# Patient Record
Sex: FEMALE | Race: WHITE | Employment: UNEMPLOYED | URBAN - NONMETROPOLITAN AREA
[De-identification: names, ages, dates, MRNs, and addresses within clinical notes are randomized per-mention and may not be internally consistent; named-entity substitution may affect disease eponyms.]

---

## 2019-10-30 ENCOUNTER — HOSPITAL ENCOUNTER (OUTPATIENT)
Dept: GENERAL RADIOLOGY | Age: 49
Discharge: HOME | End: 2019-10-30
Payer: MEDICAID

## 2019-10-30 ENCOUNTER — OFFICE VISIT (OUTPATIENT)
Dept: FAMILY MEDICINE CLINIC | Age: 49
End: 2019-10-30
Payer: MEDICAID

## 2019-10-30 VITALS
HEIGHT: 65 IN | HEART RATE: 78 BPM | DIASTOLIC BLOOD PRESSURE: 72 MMHG | BODY MASS INDEX: 30.66 KG/M2 | TEMPERATURE: 98.1 F | RESPIRATION RATE: 18 BRPM | WEIGHT: 184 LBS | OXYGEN SATURATION: 99 % | SYSTOLIC BLOOD PRESSURE: 116 MMHG

## 2019-10-30 DIAGNOSIS — Z23 NEED FOR VACCINATION: Primary | ICD-10-CM

## 2019-10-30 DIAGNOSIS — E78.01 FAMILIAL HYPERCHOLESTEROLEMIA: ICD-10-CM

## 2019-10-30 DIAGNOSIS — L40.9 PSORIASIS: ICD-10-CM

## 2019-10-30 DIAGNOSIS — M54.41 CHRONIC BILATERAL LOW BACK PAIN WITH RIGHT-SIDED SCIATICA: ICD-10-CM

## 2019-10-30 DIAGNOSIS — Z78.0 POSTMENOPAUSAL: ICD-10-CM

## 2019-10-30 DIAGNOSIS — Z76.89 ENCOUNTER TO ESTABLISH CARE: ICD-10-CM

## 2019-10-30 DIAGNOSIS — Z13.21 ENCOUNTER FOR VITAMIN DEFICIENCY SCREENING: ICD-10-CM

## 2019-10-30 DIAGNOSIS — G89.29 CHRONIC RIGHT HIP PAIN: ICD-10-CM

## 2019-10-30 DIAGNOSIS — M25.551 CHRONIC RIGHT HIP PAIN: ICD-10-CM

## 2019-10-30 DIAGNOSIS — G89.29 CHRONIC BILATERAL LOW BACK PAIN WITH RIGHT-SIDED SCIATICA: ICD-10-CM

## 2019-10-30 DIAGNOSIS — K21.9 GASTROESOPHAGEAL REFLUX DISEASE WITHOUT ESOPHAGITIS: ICD-10-CM

## 2019-10-30 DIAGNOSIS — F51.04 CHRONIC INSOMNIA: ICD-10-CM

## 2019-10-30 DIAGNOSIS — F17.210 CIGARETTE NICOTINE DEPENDENCE WITHOUT COMPLICATION: ICD-10-CM

## 2019-10-30 DIAGNOSIS — Z00.00 WELL ADULT EXAM: ICD-10-CM

## 2019-10-30 PROBLEM — E78.5 HYPERLIPIDEMIA: Status: ACTIVE | Noted: 2019-10-30

## 2019-10-30 PROCEDURE — 99204 OFFICE O/P NEW MOD 45 MIN: CPT | Performed by: NURSE PRACTITIONER

## 2019-10-30 PROCEDURE — 73502 X-RAY EXAM HIP UNI 2-3 VIEWS: CPT

## 2019-10-30 RX ORDER — OMEPRAZOLE 20 MG/1
20 CAPSULE, DELAYED RELEASE ORAL DAILY
COMMUNITY
End: 2019-10-30 | Stop reason: ALTCHOICE

## 2019-10-30 RX ORDER — VARENICLINE TARTRATE 25 MG
KIT ORAL
Qty: 1 BOX | Refills: 0 | Status: SHIPPED | OUTPATIENT
Start: 2019-10-30 | End: 2020-01-10 | Stop reason: ALTCHOICE

## 2019-10-30 RX ORDER — PANTOPRAZOLE SODIUM 40 MG/1
40 TABLET, DELAYED RELEASE ORAL
Qty: 30 TABLET | Refills: 5 | Status: SHIPPED | OUTPATIENT
Start: 2019-10-30 | End: 2021-10-12

## 2019-10-30 RX ORDER — IBUPROFEN 800 MG/1
800 TABLET ORAL 3 TIMES DAILY PRN
Qty: 90 TABLET | Refills: 3 | Status: SHIPPED | OUTPATIENT
Start: 2019-10-30 | End: 2020-03-23

## 2019-10-30 RX ORDER — VARENICLINE TARTRATE 1 MG/1
1 TABLET, FILM COATED ORAL 2 TIMES DAILY
Qty: 60 TABLET | Refills: 3 | Status: SHIPPED | OUTPATIENT
Start: 2019-10-30 | End: 2020-01-10 | Stop reason: ALTCHOICE

## 2019-10-30 ASSESSMENT — ENCOUNTER SYMPTOMS
CHEST TIGHTNESS: 0
BACK PAIN: 1
NAUSEA: 0
SORE THROAT: 0
DIARRHEA: 0
ABDOMINAL PAIN: 0
COUGH: 0
WHEEZING: 0
SHORTNESS OF BREATH: 0
VOMITING: 0

## 2019-10-31 ENCOUNTER — TELEPHONE (OUTPATIENT)
Dept: FAMILY MEDICINE CLINIC | Age: 49
End: 2019-10-31

## 2019-11-26 DIAGNOSIS — Z13.21 ENCOUNTER FOR VITAMIN DEFICIENCY SCREENING: ICD-10-CM

## 2019-11-26 DIAGNOSIS — M25.551 CHRONIC RIGHT HIP PAIN: ICD-10-CM

## 2019-11-26 DIAGNOSIS — G89.29 CHRONIC RIGHT HIP PAIN: ICD-10-CM

## 2019-11-26 DIAGNOSIS — Z00.00 WELL ADULT EXAM: ICD-10-CM

## 2019-11-26 DIAGNOSIS — E78.01 FAMILIAL HYPERCHOLESTEROLEMIA: ICD-10-CM

## 2019-11-26 LAB
ALBUMIN SERPL-MCNC: 4.2 G/DL (ref 3.5–5.2)
ALP BLD-CCNC: 73 U/L (ref 35–104)
ALT SERPL-CCNC: 7 U/L (ref 5–33)
ANION GAP SERPL CALCULATED.3IONS-SCNC: 15 MMOL/L (ref 7–19)
AST SERPL-CCNC: 11 U/L (ref 5–32)
BACTERIA: ABNORMAL /HPF
BASOPHILS ABSOLUTE: 0 K/UL (ref 0–0.2)
BASOPHILS RELATIVE PERCENT: 0.1 % (ref 0–1)
BILIRUB SERPL-MCNC: 0.3 MG/DL (ref 0.2–1.2)
BILIRUBIN URINE: NEGATIVE
BLOOD, URINE: ABNORMAL
BUN BLDV-MCNC: 12 MG/DL (ref 6–20)
CALCIUM SERPL-MCNC: 9.1 MG/DL (ref 8.6–10)
CHLORIDE BLD-SCNC: 99 MMOL/L (ref 98–111)
CHOLESTEROL, TOTAL: 207 MG/DL (ref 160–199)
CLARITY: CLEAR
CO2: 26 MMOL/L (ref 22–29)
COLOR: YELLOW
CREAT SERPL-MCNC: 0.6 MG/DL (ref 0.5–0.9)
EOSINOPHILS ABSOLUTE: 0.2 K/UL (ref 0–0.6)
EOSINOPHILS RELATIVE PERCENT: 2.5 % (ref 0–5)
EPITHELIAL CELLS, UA: 8 /HPF (ref 0–5)
GFR NON-AFRICAN AMERICAN: >60
GLUCOSE BLD-MCNC: 94 MG/DL (ref 74–109)
GLUCOSE URINE: NEGATIVE MG/DL
HCT VFR BLD CALC: 45.1 % (ref 37–47)
HDLC SERPL-MCNC: 45 MG/DL (ref 65–121)
HEMOGLOBIN: 14.7 G/DL (ref 12–16)
HYALINE CASTS: 6 /HPF (ref 0–8)
IMMATURE GRANULOCYTES #: 0 K/UL
KETONES, URINE: NEGATIVE MG/DL
LDL CHOLESTEROL CALCULATED: 151 MG/DL
LEUKOCYTE ESTERASE, URINE: NEGATIVE
LYMPHOCYTES ABSOLUTE: 2.3 K/UL (ref 1.1–4.5)
LYMPHOCYTES RELATIVE PERCENT: 26.6 % (ref 20–40)
MCH RBC QN AUTO: 30.6 PG (ref 27–31)
MCHC RBC AUTO-ENTMCNC: 32.6 G/DL (ref 33–37)
MCV RBC AUTO: 94 FL (ref 81–99)
MONOCYTES ABSOLUTE: 0.5 K/UL (ref 0–0.9)
MONOCYTES RELATIVE PERCENT: 6.2 % (ref 0–10)
NEUTROPHILS ABSOLUTE: 5.4 K/UL (ref 1.5–7.5)
NEUTROPHILS RELATIVE PERCENT: 64.4 % (ref 50–65)
NITRITE, URINE: NEGATIVE
PDW BLD-RTO: 13.2 % (ref 11.5–14.5)
PH UA: 6 (ref 5–8)
PLATELET # BLD: 213 K/UL (ref 130–400)
PMV BLD AUTO: 10.9 FL (ref 9.4–12.3)
POTASSIUM SERPL-SCNC: 4 MMOL/L (ref 3.5–5)
PROTEIN UA: NEGATIVE MG/DL
RBC # BLD: 4.8 M/UL (ref 4.2–5.4)
RBC UA: 14 /HPF (ref 0–4)
SODIUM BLD-SCNC: 140 MMOL/L (ref 136–145)
SPECIFIC GRAVITY UA: 1.02 (ref 1–1.03)
TOTAL PROTEIN: 6.9 G/DL (ref 6.6–8.7)
TRIGL SERPL-MCNC: 57 MG/DL (ref 0–149)
TSH SERPL DL<=0.05 MIU/L-ACNC: 1.46 UIU/ML (ref 0.27–4.2)
UROBILINOGEN, URINE: 0.2 E.U./DL
VITAMIN D 25-HYDROXY: 18.9 NG/ML
WBC # BLD: 8.5 K/UL (ref 4.8–10.8)
WBC UA: 1 /HPF (ref 0–5)

## 2019-12-03 ENCOUNTER — HOSPITAL ENCOUNTER (OUTPATIENT)
Dept: WOMENS IMAGING | Age: 49
Discharge: HOME | End: 2019-12-03
Payer: MEDICAID

## 2019-12-03 ENCOUNTER — OFFICE VISIT (OUTPATIENT)
Dept: FAMILY MEDICINE CLINIC | Age: 49
End: 2019-12-03
Payer: MEDICAID

## 2019-12-03 VITALS
BODY MASS INDEX: 31.49 KG/M2 | HEIGHT: 65 IN | TEMPERATURE: 98.5 F | HEART RATE: 79 BPM | SYSTOLIC BLOOD PRESSURE: 110 MMHG | WEIGHT: 189 LBS | RESPIRATION RATE: 18 BRPM | DIASTOLIC BLOOD PRESSURE: 70 MMHG | OXYGEN SATURATION: 97 %

## 2019-12-03 DIAGNOSIS — J20.9 ACUTE BRONCHITIS, UNSPECIFIED ORGANISM: ICD-10-CM

## 2019-12-03 DIAGNOSIS — E78.49 FAMILIAL HYPERLIPIDEMIA: ICD-10-CM

## 2019-12-03 DIAGNOSIS — E55.9 VITAMIN D DEFICIENCY: ICD-10-CM

## 2019-12-03 DIAGNOSIS — Z00.00 WELL ADULT EXAM: ICD-10-CM

## 2019-12-03 DIAGNOSIS — Z12.39 BREAST CANCER SCREENING: ICD-10-CM

## 2019-12-03 DIAGNOSIS — K21.9 GASTROESOPHAGEAL REFLUX DISEASE WITHOUT ESOPHAGITIS: ICD-10-CM

## 2019-12-03 DIAGNOSIS — N64.4 BREAST PAIN, LEFT: ICD-10-CM

## 2019-12-03 DIAGNOSIS — Z12.4 SCREENING FOR CERVICAL CANCER: Primary | ICD-10-CM

## 2019-12-03 DIAGNOSIS — F17.210 CIGARETTE NICOTINE DEPENDENCE WITHOUT COMPLICATION: ICD-10-CM

## 2019-12-03 DIAGNOSIS — R31.29 MICROSCOPIC HEMATURIA: ICD-10-CM

## 2019-12-03 DIAGNOSIS — M16.11 PRIMARY OSTEOARTHRITIS OF RIGHT HIP: ICD-10-CM

## 2019-12-03 LAB
APPEARANCE FLUID: CLEAR
BILIRUBIN, POC: ABNORMAL
BLOOD URINE, POC: ABNORMAL
CLARITY, POC: CLEAR
COLOR, POC: YELLOW
GLUCOSE URINE, POC: ABNORMAL
KETONES, POC: ABNORMAL
LEUKOCYTE EST, POC: ABNORMAL
NITRITE, POC: ABNORMAL
PH, POC: 6.5
PROTEIN, POC: ABNORMAL
SPECIFIC GRAVITY, POC: 1.02
UROBILINOGEN, POC: 0.2

## 2019-12-03 PROCEDURE — 87086 URINE CULTURE/COLONY COUNT: CPT

## 2019-12-03 PROCEDURE — 99213 OFFICE O/P EST LOW 20 MIN: CPT | Performed by: NURSE PRACTITIONER

## 2019-12-03 PROCEDURE — 81002 URINALYSIS NONAUTO W/O SCOPE: CPT | Performed by: NURSE PRACTITIONER

## 2019-12-03 PROCEDURE — 99396 PREV VISIT EST AGE 40-64: CPT | Performed by: NURSE PRACTITIONER

## 2019-12-03 PROCEDURE — G0279 TOMOSYNTHESIS, MAMMO: HCPCS

## 2019-12-03 RX ORDER — ERGOCALCIFEROL 1.25 MG/1
50000 CAPSULE ORAL WEEKLY
Qty: 4 CAPSULE | Refills: 3 | Status: SHIPPED | OUTPATIENT
Start: 2019-12-03 | End: 2020-11-03

## 2019-12-03 RX ORDER — AZITHROMYCIN 250 MG/1
TABLET, FILM COATED ORAL
Qty: 6 TABLET | Refills: 0 | Status: SHIPPED | OUTPATIENT
Start: 2019-12-03 | End: 2020-01-10 | Stop reason: ALTCHOICE

## 2019-12-03 RX ORDER — PRAVASTATIN SODIUM 10 MG
10 TABLET ORAL DAILY
Qty: 30 TABLET | Refills: 5 | Status: SHIPPED | OUTPATIENT
Start: 2019-12-03 | End: 2020-11-03

## 2019-12-03 ASSESSMENT — ENCOUNTER SYMPTOMS
CHEST TIGHTNESS: 0
SORE THROAT: 0
SHORTNESS OF BREATH: 0
DIARRHEA: 0
COUGH: 0
ABDOMINAL PAIN: 0
WHEEZING: 0
NAUSEA: 0

## 2019-12-04 DIAGNOSIS — Z12.4 SCREENING FOR CERVICAL CANCER: ICD-10-CM

## 2019-12-05 DIAGNOSIS — Z11.51 SCREENING FOR HPV (HUMAN PAPILLOMAVIRUS): ICD-10-CM

## 2019-12-05 DIAGNOSIS — R31.29 MICROSCOPIC HEMATURIA: Primary | ICD-10-CM

## 2019-12-05 DIAGNOSIS — Z12.4 SCREENING FOR CERVICAL CANCER: Primary | ICD-10-CM

## 2019-12-05 LAB — URINE CULTURE, ROUTINE: NORMAL

## 2019-12-06 DIAGNOSIS — Z11.51 SCREENING FOR HPV (HUMAN PAPILLOMAVIRUS): ICD-10-CM

## 2019-12-11 ENCOUNTER — TELEPHONE (OUTPATIENT)
Dept: PRIMARY CARE CLINIC | Age: 49
End: 2019-12-11

## 2019-12-12 LAB
HPV COMMENT: NORMAL
HPV TYPE 16: NOT DETECTED
HPV TYPE 18: NOT DETECTED
HPVOH (OTHER TYPES): NOT DETECTED

## 2019-12-12 RX ORDER — FLUCONAZOLE 150 MG/1
TABLET ORAL
Qty: 2 TABLET | Refills: 0 | Status: SHIPPED | OUTPATIENT
Start: 2019-12-12 | End: 2020-01-10 | Stop reason: ALTCHOICE

## 2020-01-10 ENCOUNTER — HOSPITAL ENCOUNTER (OUTPATIENT)
Dept: GENERAL RADIOLOGY | Age: 50
Discharge: HOME | End: 2020-01-10
Payer: MEDICAID

## 2020-01-10 ENCOUNTER — HOSPITAL ENCOUNTER (OUTPATIENT)
Dept: PREADMISSION TESTING | Age: 50
Discharge: HOME | End: 2020-01-14
Payer: MEDICAID

## 2020-01-10 VITALS — WEIGHT: 190 LBS | BODY MASS INDEX: 31.65 KG/M2 | HEIGHT: 65 IN

## 2020-01-10 LAB
ABO/RH: NORMAL
ANION GAP SERPL CALCULATED.3IONS-SCNC: 13 MMOL/L (ref 7–19)
ANTIBODY SCREEN: NORMAL
APTT: 26.1 SEC (ref 26–36.2)
BACTERIA: NEGATIVE /HPF
BASOPHILS ABSOLUTE: 0 K/UL (ref 0–0.2)
BASOPHILS RELATIVE PERCENT: 0.1 % (ref 0–1)
BILIRUBIN URINE: NEGATIVE
BLOOD, URINE: ABNORMAL
BUN BLDV-MCNC: 15 MG/DL (ref 6–20)
CALCIUM SERPL-MCNC: 8.8 MG/DL (ref 8.6–10)
CHLORIDE BLD-SCNC: 102 MMOL/L (ref 98–111)
CLARITY: CLEAR
CO2: 26 MMOL/L (ref 22–29)
COLOR: ABNORMAL
CREAT SERPL-MCNC: 0.8 MG/DL (ref 0.5–0.9)
EOSINOPHILS ABSOLUTE: 0.3 K/UL (ref 0–0.6)
EOSINOPHILS RELATIVE PERCENT: 3.5 % (ref 0–5)
EPITHELIAL CELLS, UA: 3 /HPF (ref 0–5)
GFR NON-AFRICAN AMERICAN: >60
GLUCOSE BLD-MCNC: 110 MG/DL (ref 74–109)
GLUCOSE URINE: NEGATIVE MG/DL
HCT VFR BLD CALC: 45.6 % (ref 37–47)
HEMOGLOBIN: 14.6 G/DL (ref 12–16)
HYALINE CASTS: 4 /HPF (ref 0–8)
IMMATURE GRANULOCYTES #: 0 K/UL
INR BLD: 0.96 (ref 0.88–1.18)
KETONES, URINE: NEGATIVE MG/DL
LEUKOCYTE ESTERASE, URINE: NEGATIVE
LYMPHOCYTES ABSOLUTE: 1.9 K/UL (ref 1.1–4.5)
LYMPHOCYTES RELATIVE PERCENT: 26.4 % (ref 20–40)
MCH RBC QN AUTO: 30.5 PG (ref 27–31)
MCHC RBC AUTO-ENTMCNC: 32 G/DL (ref 33–37)
MCV RBC AUTO: 95.4 FL (ref 81–99)
MONOCYTES ABSOLUTE: 0.5 K/UL (ref 0–0.9)
MONOCYTES RELATIVE PERCENT: 6.9 % (ref 0–10)
NEUTROPHILS ABSOLUTE: 4.5 K/UL (ref 1.5–7.5)
NEUTROPHILS RELATIVE PERCENT: 62.8 % (ref 50–65)
NITRITE, URINE: NEGATIVE
PDW BLD-RTO: 12.9 % (ref 11.5–14.5)
PH UA: 5.5 (ref 5–8)
PLATELET # BLD: 207 K/UL (ref 130–400)
PMV BLD AUTO: 10.6 FL (ref 9.4–12.3)
POTASSIUM SERPL-SCNC: 3.7 MMOL/L (ref 3.5–5)
PROTEIN UA: NEGATIVE MG/DL
PROTHROMBIN TIME: 12.2 SEC (ref 12–14.6)
RBC # BLD: 4.78 M/UL (ref 4.2–5.4)
RBC UA: 4 /HPF (ref 0–4)
SODIUM BLD-SCNC: 141 MMOL/L (ref 136–145)
SPECIFIC GRAVITY UA: 1.02 (ref 1–1.03)
URINE REFLEX TO CULTURE: ABNORMAL
UROBILINOGEN, URINE: 0.2 E.U./DL
WBC # BLD: 7.2 K/UL (ref 4.8–10.8)
WBC UA: 1 /HPF (ref 0–5)

## 2020-01-10 PROCEDURE — 80048 BASIC METABOLIC PNL TOTAL CA: CPT

## 2020-01-10 PROCEDURE — 71046 X-RAY EXAM CHEST 2 VIEWS: CPT

## 2020-01-10 PROCEDURE — 87081 CULTURE SCREEN ONLY: CPT

## 2020-01-10 PROCEDURE — 85730 THROMBOPLASTIN TIME PARTIAL: CPT

## 2020-01-10 PROCEDURE — 86850 RBC ANTIBODY SCREEN: CPT

## 2020-01-10 PROCEDURE — 81001 URINALYSIS AUTO W/SCOPE: CPT

## 2020-01-10 PROCEDURE — 93005 ELECTROCARDIOGRAM TRACING: CPT | Performed by: ORTHOPAEDIC SURGERY

## 2020-01-10 PROCEDURE — 86901 BLOOD TYPING SEROLOGIC RH(D): CPT

## 2020-01-10 PROCEDURE — 85025 COMPLETE CBC W/AUTO DIFF WBC: CPT

## 2020-01-10 PROCEDURE — G0480 DRUG TEST DEF 1-7 CLASSES: HCPCS

## 2020-01-10 PROCEDURE — 86900 BLOOD TYPING SEROLOGIC ABO: CPT

## 2020-01-10 PROCEDURE — 85610 PROTHROMBIN TIME: CPT

## 2020-01-11 LAB
EKG P AXIS: 27 DEGREES
EKG P-R INTERVAL: 138 MS
EKG Q-T INTERVAL: 374 MS
EKG QRS DURATION: 74 MS
EKG QTC CALCULATION (BAZETT): 405 MS
EKG T AXIS: 21 DEGREES

## 2020-01-11 PROCEDURE — 93010 ELECTROCARDIOGRAM REPORT: CPT | Performed by: INTERNAL MEDICINE

## 2020-01-12 LAB — MRSA CULTURE ONLY: NORMAL

## 2020-01-15 LAB
3-OH-COTININE: <2 NG/ML
COTININE: <2 NG/ML
NICOTINE: <2 NG/ML

## 2020-02-24 NOTE — CARE COORDINATION
Patient attended [employer] [facility] [employer] [facility] on 12-.   Electronically signed by Michele Odonnell RN on 2/24/2020 at 11:29 AM

## 2020-02-27 ENCOUNTER — APPOINTMENT (OUTPATIENT)
Dept: GENERAL RADIOLOGY | Age: 50
DRG: 470 | End: 2020-02-27
Attending: ORTHOPAEDIC SURGERY
Payer: MEDICAID

## 2020-02-27 ENCOUNTER — ANESTHESIA EVENT (OUTPATIENT)
Dept: OPERATING ROOM | Age: 50
DRG: 470 | End: 2020-02-27
Payer: MEDICAID

## 2020-02-27 ENCOUNTER — HOSPITAL ENCOUNTER (INPATIENT)
Age: 50
LOS: 2 days | Discharge: HOME HEALTH | DRG: 470 | End: 2020-02-29
Attending: ORTHOPAEDIC SURGERY | Admitting: ORTHOPAEDIC SURGERY
Payer: MEDICAID

## 2020-02-27 ENCOUNTER — ANESTHESIA (OUTPATIENT)
Dept: OPERATING ROOM | Age: 50
DRG: 470 | End: 2020-02-27
Payer: MEDICAID

## 2020-02-27 VITALS — DIASTOLIC BLOOD PRESSURE: 55 MMHG | SYSTOLIC BLOOD PRESSURE: 108 MMHG | OXYGEN SATURATION: 95 % | TEMPERATURE: 97 F

## 2020-02-27 LAB
ABO/RH: NORMAL
ANTIBODY SCREEN: NORMAL
HCT VFR BLD CALC: 29.2 % (ref 37–47)
HEMOGLOBIN: 9.7 G/DL (ref 12–16)

## 2020-02-27 PROCEDURE — C1776 JOINT DEVICE (IMPLANTABLE): HCPCS | Performed by: ORTHOPAEDIC SURGERY

## 2020-02-27 PROCEDURE — 73502 X-RAY EXAM HIP UNI 2-3 VIEWS: CPT

## 2020-02-27 PROCEDURE — P9016 RBC LEUKOCYTES REDUCED: HCPCS

## 2020-02-27 PROCEDURE — 2709999900 HC NON-CHARGEABLE SUPPLY: Performed by: ORTHOPAEDIC SURGERY

## 2020-02-27 PROCEDURE — P9045 ALBUMIN (HUMAN), 5%, 250 ML: HCPCS | Performed by: NURSE ANESTHETIST, CERTIFIED REGISTERED

## 2020-02-27 PROCEDURE — 6360000002 HC RX W HCPCS: Performed by: ANESTHESIOLOGY

## 2020-02-27 PROCEDURE — 2700000000 HC OXYGEN THERAPY PER DAY

## 2020-02-27 PROCEDURE — 36415 COLL VENOUS BLD VENIPUNCTURE: CPT

## 2020-02-27 PROCEDURE — 2580000003 HC RX 258: Performed by: ORTHOPAEDIC SURGERY

## 2020-02-27 PROCEDURE — 86850 RBC ANTIBODY SCREEN: CPT

## 2020-02-27 PROCEDURE — 2500000003 HC RX 250 WO HCPCS: Performed by: ORTHOPAEDIC SURGERY

## 2020-02-27 PROCEDURE — 6360000002 HC RX W HCPCS: Performed by: NURSE ANESTHETIST, CERTIFIED REGISTERED

## 2020-02-27 PROCEDURE — 3700000001 HC ADD 15 MINUTES (ANESTHESIA): Performed by: ORTHOPAEDIC SURGERY

## 2020-02-27 PROCEDURE — 3600000005 HC SURGERY LEVEL 5 BASE: Performed by: ORTHOPAEDIC SURGERY

## 2020-02-27 PROCEDURE — 86900 BLOOD TYPING SEROLOGIC ABO: CPT

## 2020-02-27 PROCEDURE — 7100000000 HC PACU RECOVERY - FIRST 15 MIN: Performed by: ORTHOPAEDIC SURGERY

## 2020-02-27 PROCEDURE — 85018 HEMOGLOBIN: CPT

## 2020-02-27 PROCEDURE — 6360000002 HC RX W HCPCS: Performed by: ORTHOPAEDIC SURGERY

## 2020-02-27 PROCEDURE — 2500000003 HC RX 250 WO HCPCS: Performed by: NURSE ANESTHETIST, CERTIFIED REGISTERED

## 2020-02-27 PROCEDURE — 6370000000 HC RX 637 (ALT 250 FOR IP): Performed by: ORTHOPAEDIC SURGERY

## 2020-02-27 PROCEDURE — 3209999900 FLUORO FOR SURGICAL PROCEDURES

## 2020-02-27 PROCEDURE — 86901 BLOOD TYPING SEROLOGIC RH(D): CPT

## 2020-02-27 PROCEDURE — 0SR902A REPLACEMENT OF RIGHT HIP JOINT WITH METAL ON POLYETHYLENE SYNTHETIC SUBSTITUTE, UNCEMENTED, OPEN APPROACH: ICD-10-PCS | Performed by: ORTHOPAEDIC SURGERY

## 2020-02-27 PROCEDURE — 3600000015 HC SURGERY LEVEL 5 ADDTL 15MIN: Performed by: ORTHOPAEDIC SURGERY

## 2020-02-27 PROCEDURE — 86923 COMPATIBILITY TEST ELECTRIC: CPT

## 2020-02-27 PROCEDURE — 3700000000 HC ANESTHESIA ATTENDED CARE: Performed by: ORTHOPAEDIC SURGERY

## 2020-02-27 PROCEDURE — 7100000001 HC PACU RECOVERY - ADDTL 15 MIN: Performed by: ORTHOPAEDIC SURGERY

## 2020-02-27 PROCEDURE — 1210000000 HC MED SURG R&B

## 2020-02-27 PROCEDURE — 85014 HEMATOCRIT: CPT

## 2020-02-27 DEVICE — HEAD FEM NEUT 4X36 MM HIP BIOLOX: Type: IMPLANTABLE DEVICE | Site: HIP | Status: FUNCTIONAL

## 2020-02-27 DEVICE — SHELL ACET 3 HOLE 50 MM HIP LOGICAL G-SERIES: Type: IMPLANTABLE DEVICE | Status: FUNCTIONAL

## 2020-02-27 DEVICE — LOGICAL XLPE HOODED LINER SIZE 36/52-54MM
Type: IMPLANTABLE DEVICE | Site: HIP | Status: FUNCTIONAL
Brand: PAXEON LOGICAL

## 2020-02-27 DEVICE — IMPLANTABLE DEVICE
Type: IMPLANTABLE DEVICE | Site: HIP | Status: FUNCTIONAL
Brand: ORIGIN COXA VARA HIP STEM

## 2020-02-27 RX ORDER — OXYCODONE HYDROCHLORIDE 5 MG/1
10 TABLET ORAL EVERY 4 HOURS PRN
Status: DISCONTINUED | OUTPATIENT
Start: 2020-02-27 | End: 2020-02-29 | Stop reason: HOSPADM

## 2020-02-27 RX ORDER — LABETALOL 20 MG/4 ML (5 MG/ML) INTRAVENOUS SYRINGE
5 EVERY 10 MIN PRN
Status: DISCONTINUED | OUTPATIENT
Start: 2020-02-27 | End: 2020-02-27 | Stop reason: HOSPADM

## 2020-02-27 RX ORDER — LIDOCAINE HYDROCHLORIDE 10 MG/ML
INJECTION, SOLUTION EPIDURAL; INFILTRATION; INTRACAUDAL; PERINEURAL PRN
Status: DISCONTINUED | OUTPATIENT
Start: 2020-02-27 | End: 2020-02-27 | Stop reason: SDUPTHER

## 2020-02-27 RX ORDER — CARBOXYMETHYLCELLULOSE SODIUM 5 MG/ML
1 SOLUTION/ DROPS OPHTHALMIC 3 TIMES DAILY
Status: DISCONTINUED | OUTPATIENT
Start: 2020-02-27 | End: 2020-02-29 | Stop reason: HOSPADM

## 2020-02-27 RX ORDER — PROPOFOL 10 MG/ML
INJECTION, EMULSION INTRAVENOUS PRN
Status: DISCONTINUED | OUTPATIENT
Start: 2020-02-27 | End: 2020-02-27 | Stop reason: SDUPTHER

## 2020-02-27 RX ORDER — ACETAMINOPHEN 500 MG
1000 TABLET ORAL ONCE
Status: COMPLETED | OUTPATIENT
Start: 2020-02-27 | End: 2020-02-27

## 2020-02-27 RX ORDER — SENNA AND DOCUSATE SODIUM 50; 8.6 MG/1; MG/1
1 TABLET, FILM COATED ORAL 2 TIMES DAILY
Status: DISCONTINUED | OUTPATIENT
Start: 2020-02-27 | End: 2020-02-29 | Stop reason: HOSPADM

## 2020-02-27 RX ORDER — MIDAZOLAM HYDROCHLORIDE 1 MG/ML
2 INJECTION INTRAMUSCULAR; INTRAVENOUS
Status: COMPLETED | OUTPATIENT
Start: 2020-02-27 | End: 2020-02-27

## 2020-02-27 RX ORDER — PROPOFOL 10 MG/ML
INJECTION, EMULSION INTRAVENOUS CONTINUOUS PRN
Status: DISCONTINUED | OUTPATIENT
Start: 2020-02-27 | End: 2020-02-27 | Stop reason: SDUPTHER

## 2020-02-27 RX ORDER — MORPHINE SULFATE 4 MG/ML
4 INJECTION, SOLUTION INTRAMUSCULAR; INTRAVENOUS
Status: DISCONTINUED | OUTPATIENT
Start: 2020-02-27 | End: 2020-02-29 | Stop reason: HOSPADM

## 2020-02-27 RX ORDER — SODIUM CHLORIDE 0.9 % (FLUSH) 0.9 %
10 SYRINGE (ML) INJECTION EVERY 12 HOURS SCHEDULED
Status: DISCONTINUED | OUTPATIENT
Start: 2020-02-27 | End: 2020-02-29 | Stop reason: HOSPADM

## 2020-02-27 RX ORDER — BUPIVACAINE HYDROCHLORIDE 7.5 MG/ML
INJECTION, SOLUTION INTRASPINAL PRN
Status: DISCONTINUED | OUTPATIENT
Start: 2020-02-27 | End: 2020-02-27 | Stop reason: SDUPTHER

## 2020-02-27 RX ORDER — MIDAZOLAM HYDROCHLORIDE 1 MG/ML
INJECTION INTRAMUSCULAR; INTRAVENOUS PRN
Status: DISCONTINUED | OUTPATIENT
Start: 2020-02-27 | End: 2020-02-27 | Stop reason: SDUPTHER

## 2020-02-27 RX ORDER — PROMETHAZINE HYDROCHLORIDE 25 MG/1
12.5 TABLET ORAL EVERY 6 HOURS PRN
Status: DISCONTINUED | OUTPATIENT
Start: 2020-02-27 | End: 2020-02-29 | Stop reason: HOSPADM

## 2020-02-27 RX ORDER — MORPHINE SULFATE 4 MG/ML
4 INJECTION, SOLUTION INTRAMUSCULAR; INTRAVENOUS
Status: DISCONTINUED | OUTPATIENT
Start: 2020-02-27 | End: 2020-02-27 | Stop reason: HOSPADM

## 2020-02-27 RX ORDER — ONDANSETRON 2 MG/ML
4 INJECTION INTRAMUSCULAR; INTRAVENOUS EVERY 6 HOURS PRN
Status: DISCONTINUED | OUTPATIENT
Start: 2020-02-27 | End: 2020-02-29 | Stop reason: HOSPADM

## 2020-02-27 RX ORDER — MEPERIDINE HYDROCHLORIDE 50 MG/ML
12.5 INJECTION INTRAMUSCULAR; INTRAVENOUS; SUBCUTANEOUS EVERY 5 MIN PRN
Status: DISCONTINUED | OUTPATIENT
Start: 2020-02-27 | End: 2020-02-27 | Stop reason: HOSPADM

## 2020-02-27 RX ORDER — KETAMINE HYDROCHLORIDE 50 MG/ML
INJECTION, SOLUTION, CONCENTRATE INTRAMUSCULAR; INTRAVENOUS PRN
Status: DISCONTINUED | OUTPATIENT
Start: 2020-02-27 | End: 2020-02-27 | Stop reason: SDUPTHER

## 2020-02-27 RX ORDER — ROPIVACAINE HYDROCHLORIDE 2 MG/ML
INJECTION, SOLUTION EPIDURAL; INFILTRATION; PERINEURAL PRN
Status: DISCONTINUED | OUTPATIENT
Start: 2020-02-27 | End: 2020-02-27 | Stop reason: HOSPADM

## 2020-02-27 RX ORDER — ERGOCALCIFEROL 1.25 MG/1
50000 CAPSULE ORAL WEEKLY
Status: DISCONTINUED | OUTPATIENT
Start: 2020-02-27 | End: 2020-02-29 | Stop reason: HOSPADM

## 2020-02-27 RX ORDER — SODIUM CHLORIDE 0.9 % (FLUSH) 0.9 %
10 SYRINGE (ML) INJECTION PRN
Status: DISCONTINUED | OUTPATIENT
Start: 2020-02-27 | End: 2020-02-27 | Stop reason: HOSPADM

## 2020-02-27 RX ORDER — OXYCODONE HCL 10 MG/1
10 TABLET, FILM COATED, EXTENDED RELEASE ORAL
Status: COMPLETED | OUTPATIENT
Start: 2020-02-27 | End: 2020-02-27

## 2020-02-27 RX ORDER — MORPHINE SULFATE 4 MG/ML
2 INJECTION, SOLUTION INTRAMUSCULAR; INTRAVENOUS EVERY 5 MIN PRN
Status: DISCONTINUED | OUTPATIENT
Start: 2020-02-27 | End: 2020-02-27 | Stop reason: HOSPADM

## 2020-02-27 RX ORDER — MORPHINE SULFATE 4 MG/ML
4 INJECTION, SOLUTION INTRAMUSCULAR; INTRAVENOUS EVERY 5 MIN PRN
Status: DISCONTINUED | OUTPATIENT
Start: 2020-02-27 | End: 2020-02-27 | Stop reason: HOSPADM

## 2020-02-27 RX ORDER — DIPHENHYDRAMINE HYDROCHLORIDE 50 MG/ML
12.5 INJECTION INTRAMUSCULAR; INTRAVENOUS
Status: DISCONTINUED | OUTPATIENT
Start: 2020-02-27 | End: 2020-02-27 | Stop reason: HOSPADM

## 2020-02-27 RX ORDER — SCOLOPAMINE TRANSDERMAL SYSTEM 1 MG/1
1 PATCH, EXTENDED RELEASE TRANSDERMAL ONCE
Status: DISCONTINUED | OUTPATIENT
Start: 2020-02-27 | End: 2020-02-27

## 2020-02-27 RX ORDER — HYDRALAZINE HYDROCHLORIDE 20 MG/ML
5 INJECTION INTRAMUSCULAR; INTRAVENOUS EVERY 10 MIN PRN
Status: DISCONTINUED | OUTPATIENT
Start: 2020-02-27 | End: 2020-02-27 | Stop reason: HOSPADM

## 2020-02-27 RX ORDER — PROMETHAZINE HYDROCHLORIDE 25 MG/ML
6.25 INJECTION, SOLUTION INTRAMUSCULAR; INTRAVENOUS
Status: DISCONTINUED | OUTPATIENT
Start: 2020-02-27 | End: 2020-02-27 | Stop reason: HOSPADM

## 2020-02-27 RX ORDER — SODIUM CHLORIDE, SODIUM LACTATE, POTASSIUM CHLORIDE, CALCIUM CHLORIDE 600; 310; 30; 20 MG/100ML; MG/100ML; MG/100ML; MG/100ML
INJECTION, SOLUTION INTRAVENOUS CONTINUOUS
Status: DISCONTINUED | OUTPATIENT
Start: 2020-02-27 | End: 2020-02-29 | Stop reason: HOSPADM

## 2020-02-27 RX ORDER — SODIUM CHLORIDE, SODIUM LACTATE, POTASSIUM CHLORIDE, CALCIUM CHLORIDE 600; 310; 30; 20 MG/100ML; MG/100ML; MG/100ML; MG/100ML
INJECTION, SOLUTION INTRAVENOUS CONTINUOUS
Status: DISCONTINUED | OUTPATIENT
Start: 2020-02-27 | End: 2020-02-27

## 2020-02-27 RX ORDER — DEXAMETHASONE SODIUM PHOSPHATE 10 MG/ML
INJECTION, SOLUTION INTRAMUSCULAR; INTRAVENOUS PRN
Status: DISCONTINUED | OUTPATIENT
Start: 2020-02-27 | End: 2020-02-27 | Stop reason: SDUPTHER

## 2020-02-27 RX ORDER — LIDOCAINE HYDROCHLORIDE 10 MG/ML
1 INJECTION, SOLUTION EPIDURAL; INFILTRATION; INTRACAUDAL; PERINEURAL
Status: DISCONTINUED | OUTPATIENT
Start: 2020-02-27 | End: 2020-02-27 | Stop reason: HOSPADM

## 2020-02-27 RX ORDER — TRANEXAMIC ACID 650 1/1
1950 TABLET ORAL
Status: COMPLETED | OUTPATIENT
Start: 2020-02-27 | End: 2020-02-27

## 2020-02-27 RX ORDER — MORPHINE SULFATE 4 MG/ML
2 INJECTION, SOLUTION INTRAMUSCULAR; INTRAVENOUS
Status: DISCONTINUED | OUTPATIENT
Start: 2020-02-27 | End: 2020-02-29 | Stop reason: HOSPADM

## 2020-02-27 RX ORDER — METOCLOPRAMIDE HYDROCHLORIDE 5 MG/ML
10 INJECTION INTRAMUSCULAR; INTRAVENOUS
Status: DISCONTINUED | OUTPATIENT
Start: 2020-02-27 | End: 2020-02-27 | Stop reason: HOSPADM

## 2020-02-27 RX ORDER — CELECOXIB 200 MG/1
200 CAPSULE ORAL ONCE
Status: COMPLETED | OUTPATIENT
Start: 2020-02-27 | End: 2020-02-27

## 2020-02-27 RX ORDER — PANTOPRAZOLE SODIUM 40 MG/1
40 TABLET, DELAYED RELEASE ORAL
Status: DISCONTINUED | OUTPATIENT
Start: 2020-02-28 | End: 2020-02-29 | Stop reason: HOSPADM

## 2020-02-27 RX ORDER — APREPITANT 40 MG/1
40 CAPSULE ORAL ONCE
Status: COMPLETED | OUTPATIENT
Start: 2020-02-27 | End: 2020-02-27

## 2020-02-27 RX ORDER — 0.9 % SODIUM CHLORIDE 0.9 %
500 INTRAVENOUS SOLUTION INTRAVENOUS PRN
Status: COMPLETED | OUTPATIENT
Start: 2020-02-27 | End: 2020-02-27

## 2020-02-27 RX ORDER — SODIUM CHLORIDE 0.9 % (FLUSH) 0.9 %
10 SYRINGE (ML) INJECTION EVERY 12 HOURS SCHEDULED
Status: DISCONTINUED | OUTPATIENT
Start: 2020-02-27 | End: 2020-02-27 | Stop reason: HOSPADM

## 2020-02-27 RX ORDER — OXYCODONE HYDROCHLORIDE 5 MG/1
5 TABLET ORAL EVERY 4 HOURS PRN
Status: DISCONTINUED | OUTPATIENT
Start: 2020-02-27 | End: 2020-02-29 | Stop reason: HOSPADM

## 2020-02-27 RX ORDER — SODIUM CHLORIDE 0.9 % (FLUSH) 0.9 %
10 SYRINGE (ML) INJECTION PRN
Status: DISCONTINUED | OUTPATIENT
Start: 2020-02-27 | End: 2020-02-29 | Stop reason: HOSPADM

## 2020-02-27 RX ORDER — ONDANSETRON 2 MG/ML
INJECTION INTRAMUSCULAR; INTRAVENOUS PRN
Status: DISCONTINUED | OUTPATIENT
Start: 2020-02-27 | End: 2020-02-27 | Stop reason: SDUPTHER

## 2020-02-27 RX ORDER — PRAVASTATIN SODIUM 10 MG
10 TABLET ORAL DAILY
Status: DISCONTINUED | OUTPATIENT
Start: 2020-02-27 | End: 2020-02-29 | Stop reason: HOSPADM

## 2020-02-27 RX ORDER — FENTANYL CITRATE 50 UG/ML
50 INJECTION, SOLUTION INTRAMUSCULAR; INTRAVENOUS
Status: DISCONTINUED | OUTPATIENT
Start: 2020-02-27 | End: 2020-02-27 | Stop reason: HOSPADM

## 2020-02-27 RX ORDER — ALBUMIN, HUMAN INJ 5% 5 %
SOLUTION INTRAVENOUS PRN
Status: DISCONTINUED | OUTPATIENT
Start: 2020-02-27 | End: 2020-02-27 | Stop reason: SDUPTHER

## 2020-02-27 RX ORDER — ACETAMINOPHEN 325 MG/1
650 TABLET ORAL EVERY 6 HOURS
Status: DISCONTINUED | OUTPATIENT
Start: 2020-02-27 | End: 2020-02-29 | Stop reason: HOSPADM

## 2020-02-27 RX ORDER — ENALAPRILAT 2.5 MG/2ML
1.25 INJECTION INTRAVENOUS
Status: DISCONTINUED | OUTPATIENT
Start: 2020-02-27 | End: 2020-02-27 | Stop reason: HOSPADM

## 2020-02-27 RX ADMIN — OXYCODONE 10 MG: 5 TABLET ORAL at 22:22

## 2020-02-27 RX ADMIN — APREPITANT 40 MG: 40 CAPSULE ORAL at 08:11

## 2020-02-27 RX ADMIN — MIDAZOLAM 2 MG: 1 INJECTION INTRAMUSCULAR; INTRAVENOUS at 08:37

## 2020-02-27 RX ADMIN — SODIUM CHLORIDE, POTASSIUM CHLORIDE, SODIUM LACTATE AND CALCIUM CHLORIDE: 600; 310; 30; 20 INJECTION, SOLUTION INTRAVENOUS at 22:18

## 2020-02-27 RX ADMIN — ACETAMINOPHEN 1000 MG: 500 TABLET, FILM COATED ORAL at 06:41

## 2020-02-27 RX ADMIN — PHENYLEPHRINE HYDROCHLORIDE 80 MCG: 10 INJECTION INTRAVENOUS at 10:08

## 2020-02-27 RX ADMIN — OXYCODONE HYDROCHLORIDE 10 MG: 10 TABLET, FILM COATED, EXTENDED RELEASE ORAL at 06:41

## 2020-02-27 RX ADMIN — LIDOCAINE HYDROCHLORIDE 8 ML: 10 INJECTION, SOLUTION EPIDURAL; INFILTRATION; INTRACAUDAL; PERINEURAL at 08:42

## 2020-02-27 RX ADMIN — OXYCODONE 10 MG: 5 TABLET ORAL at 12:56

## 2020-02-27 RX ADMIN — CARBOXYMETHYLCELLULOSE SODIUM 1 DROP: 5 SOLUTION/ DROPS OPHTHALMIC at 16:09

## 2020-02-27 RX ADMIN — PHENYLEPHRINE HYDROCHLORIDE 80 MCG: 10 INJECTION INTRAVENOUS at 10:28

## 2020-02-27 RX ADMIN — CELECOXIB 200 MG: 200 CAPSULE ORAL at 06:41

## 2020-02-27 RX ADMIN — PHENYLEPHRINE HYDROCHLORIDE 80 MCG: 10 INJECTION INTRAVENOUS at 09:35

## 2020-02-27 RX ADMIN — ASPIRIN 325 MG: 325 TABLET, COATED ORAL at 19:55

## 2020-02-27 RX ADMIN — OXYCODONE 10 MG: 5 TABLET ORAL at 17:56

## 2020-02-27 RX ADMIN — CARBOXYMETHYLCELLULOSE SODIUM 1 DROP: 5 SOLUTION/ DROPS OPHTHALMIC at 19:56

## 2020-02-27 RX ADMIN — PHENYLEPHRINE HYDROCHLORIDE 80 MCG: 10 INJECTION INTRAVENOUS at 09:30

## 2020-02-27 RX ADMIN — SODIUM CHLORIDE 500 ML: 9 INJECTION, SOLUTION INTRAVENOUS at 15:11

## 2020-02-27 RX ADMIN — MIDAZOLAM 2 MG: 1 INJECTION INTRAMUSCULAR; INTRAVENOUS at 08:11

## 2020-02-27 RX ADMIN — PROPOFOL 30 MG: 10 INJECTION, EMULSION INTRAVENOUS at 08:53

## 2020-02-27 RX ADMIN — PHENYLEPHRINE HYDROCHLORIDE 80 MCG: 10 INJECTION INTRAVENOUS at 10:51

## 2020-02-27 RX ADMIN — PHENYLEPHRINE HYDROCHLORIDE 80 MCG: 10 INJECTION INTRAVENOUS at 10:02

## 2020-02-27 RX ADMIN — PHENYLEPHRINE HYDROCHLORIDE 80 MCG: 10 INJECTION INTRAVENOUS at 10:38

## 2020-02-27 RX ADMIN — ACETAMINOPHEN 650 MG: 325 TABLET ORAL at 19:55

## 2020-02-27 RX ADMIN — Medication 50 MG: at 08:53

## 2020-02-27 RX ADMIN — DEXAMETHASONE SODIUM PHOSPHATE 10 MG: 10 INJECTION, SOLUTION INTRAMUSCULAR; INTRAVENOUS at 08:57

## 2020-02-27 RX ADMIN — MORPHINE SULFATE 4 MG: 4 INJECTION, SOLUTION INTRAMUSCULAR; INTRAVENOUS at 16:58

## 2020-02-27 RX ADMIN — SODIUM CHLORIDE, POTASSIUM CHLORIDE, SODIUM LACTATE AND CALCIUM CHLORIDE: 600; 310; 30; 20 INJECTION, SOLUTION INTRAVENOUS at 13:58

## 2020-02-27 RX ADMIN — ALBUMIN (HUMAN) 250 ML: 2.5 SOLUTION INTRAVENOUS at 10:03

## 2020-02-27 RX ADMIN — MORPHINE SULFATE 4 MG: 4 INJECTION, SOLUTION INTRAMUSCULAR; INTRAVENOUS at 11:32

## 2020-02-27 RX ADMIN — PHENYLEPHRINE HYDROCHLORIDE 80 MCG: 10 INJECTION INTRAVENOUS at 09:17

## 2020-02-27 RX ADMIN — PHENYLEPHRINE HYDROCHLORIDE 80 MCG: 10 INJECTION INTRAVENOUS at 10:03

## 2020-02-27 RX ADMIN — Medication 2 G: at 17:06

## 2020-02-27 RX ADMIN — MORPHINE SULFATE 2 MG: 4 INJECTION, SOLUTION INTRAMUSCULAR; INTRAVENOUS at 19:55

## 2020-02-27 RX ADMIN — ALBUMIN (HUMAN) 250 ML: 2.5 SOLUTION INTRAVENOUS at 09:46

## 2020-02-27 RX ADMIN — PHENYLEPHRINE HYDROCHLORIDE 80 MCG: 10 INJECTION INTRAVENOUS at 10:05

## 2020-02-27 RX ADMIN — PHENYLEPHRINE HYDROCHLORIDE 160 MCG: 10 INJECTION INTRAVENOUS at 10:15

## 2020-02-27 RX ADMIN — SODIUM CHLORIDE, SODIUM LACTATE, POTASSIUM CHLORIDE, AND CALCIUM CHLORIDE: 600; 310; 30; 20 INJECTION, SOLUTION INTRAVENOUS at 09:23

## 2020-02-27 RX ADMIN — BUPIVACAINE HYDROCHLORIDE IN DEXTROSE 2 ML: 7.5 INJECTION, SOLUTION SUBARACHNOID at 08:49

## 2020-02-27 RX ADMIN — PHENYLEPHRINE HYDROCHLORIDE 80 MCG: 10 INJECTION INTRAVENOUS at 10:43

## 2020-02-27 RX ADMIN — MORPHINE SULFATE 4 MG: 4 INJECTION, SOLUTION INTRAMUSCULAR; INTRAVENOUS at 13:57

## 2020-02-27 RX ADMIN — PRAVASTATIN SODIUM 10 MG: 10 TABLET ORAL at 13:57

## 2020-02-27 RX ADMIN — ACETAMINOPHEN 650 MG: 325 TABLET ORAL at 13:57

## 2020-02-27 RX ADMIN — SODIUM CHLORIDE, SODIUM LACTATE, POTASSIUM CHLORIDE, AND CALCIUM CHLORIDE: 600; 310; 30; 20 INJECTION, SOLUTION INTRAVENOUS at 11:03

## 2020-02-27 RX ADMIN — SENNOSIDES AND DOCUSATE SODIUM 1 TABLET: 8.6; 5 TABLET ORAL at 13:57

## 2020-02-27 RX ADMIN — PHENYLEPHRINE HYDROCHLORIDE 80 MCG: 10 INJECTION INTRAVENOUS at 10:11

## 2020-02-27 RX ADMIN — ERGOCALCIFEROL 50000 UNITS: 1.25 CAPSULE, LIQUID FILLED ORAL at 15:00

## 2020-02-27 RX ADMIN — PHENYLEPHRINE HYDROCHLORIDE 160 MCG: 10 INJECTION INTRAVENOUS at 09:43

## 2020-02-27 RX ADMIN — PHENYLEPHRINE HYDROCHLORIDE 80 MCG: 10 INJECTION INTRAVENOUS at 09:33

## 2020-02-27 RX ADMIN — ASPIRIN 325 MG: 325 TABLET, COATED ORAL at 13:57

## 2020-02-27 RX ADMIN — PHENYLEPHRINE HYDROCHLORIDE 80 MCG: 10 INJECTION INTRAVENOUS at 09:25

## 2020-02-27 RX ADMIN — PROPOFOL 100 MCG/KG/MIN: 10 INJECTION, EMULSION INTRAVENOUS at 08:53

## 2020-02-27 RX ADMIN — PHENYLEPHRINE HYDROCHLORIDE 160 MCG: 10 INJECTION INTRAVENOUS at 09:59

## 2020-02-27 RX ADMIN — SODIUM CHLORIDE, SODIUM LACTATE, POTASSIUM CHLORIDE, AND CALCIUM CHLORIDE: 600; 310; 30; 20 INJECTION, SOLUTION INTRAVENOUS at 10:32

## 2020-02-27 RX ADMIN — ONDANSETRON HYDROCHLORIDE 4 MG: 2 INJECTION, SOLUTION INTRAMUSCULAR; INTRAVENOUS at 08:37

## 2020-02-27 RX ADMIN — PHENYLEPHRINE HYDROCHLORIDE 80 MCG: 10 INJECTION INTRAVENOUS at 09:23

## 2020-02-27 RX ADMIN — SENNOSIDES AND DOCUSATE SODIUM 1 TABLET: 8.6; 5 TABLET ORAL at 22:22

## 2020-02-27 RX ADMIN — PHENYLEPHRINE HYDROCHLORIDE 160 MCG: 10 INJECTION INTRAVENOUS at 09:38

## 2020-02-27 RX ADMIN — SODIUM CHLORIDE, SODIUM LACTATE, POTASSIUM CHLORIDE, AND CALCIUM CHLORIDE: 600; 310; 30; 20 INJECTION, SOLUTION INTRAVENOUS at 06:42

## 2020-02-27 RX ADMIN — TRANEXAMIC ACID 1950 MG: 650 TABLET ORAL at 06:41

## 2020-02-27 RX ADMIN — Medication 2 G: at 08:52

## 2020-02-27 ASSESSMENT — PAIN SCALES - GENERAL
PAINLEVEL_OUTOF10: 10
PAINLEVEL_OUTOF10: 2
PAINLEVEL_OUTOF10: 9
PAINLEVEL_OUTOF10: 9
PAINLEVEL_OUTOF10: 10
PAINLEVEL_OUTOF10: 2
PAINLEVEL_OUTOF10: 10

## 2020-02-27 ASSESSMENT — PAIN DESCRIPTION - LOCATION: LOCATION: HIP

## 2020-02-27 ASSESSMENT — PAIN DESCRIPTION - PAIN TYPE: TYPE: SURGICAL PAIN

## 2020-02-27 ASSESSMENT — LIFESTYLE VARIABLES: SMOKING_STATUS: 0

## 2020-02-27 ASSESSMENT — PAIN DESCRIPTION - ORIENTATION: ORIENTATION: RIGHT

## 2020-02-27 NOTE — H&P
[address] Pre-Operative History and Physical    Patient Name: Kaylynn Beltran  : 1970        Chief Complaint: right hip pain  History of Present Illness: This patient has had ongoing pain for several weeks/months that has been unresponsive to conservative care which has included injection, therapy, activity modification and presents now for surgery. Pain is deep in the groin buttock extending to the knee at times. Pain is a severe ache that is worse with walking, standing and getting up and down. Pain is somewhat improved with over the counter medication. Past Medical History:       Diagnosis Date    Abnormal Pap smear of cervix     GERD (gastroesophageal reflux disease)     Hip pain     Hyperlipidemia     Nephrolithiasis     Primary osteoarthritis of right hip 12/3/2019    Psoriasis      Past Surgical History:       Procedure Laterality Date    CHOLECYSTECTOMY      HYSTERECTOMY      HYSTERECTOMY, VAGINAL      No BSO    LITHOTRIPSY         Medications:   Prior to Admission medications    Medication Sig Start Date End Date Taking? Authorizing Provider   ibuprofen (ADVIL;MOTRIN) 800 MG tablet Take 1 tablet by mouth 3 times daily as needed for Pain 10/30/19  Yes JAEL Gooden   pantoprazole (PROTONIX) 40 MG tablet Take 1 tablet by mouth every morning (before breakfast) 10/30/19  Yes JAEL Coreas   pravastatin (PRAVACHOL) 10 MG tablet Take 1 tablet by mouth daily  Patient taking differently: Take 10 mg by mouth daily as needed  12/3/19   JAEL Coreas   vitamin D (ERGOCALCIFEROL) 1.25 MG (37362 UT) CAPS capsule Take 1 capsule by mouth once a week  Patient taking differently: Take 50,000 Units by mouth once a week Friday 12/3/19   JAEL Coreas       Allergies:  Patient has no known allergies. Social History:   Tobacco:  reports that she quit smoking about 2 months ago. Her smoking use included cigarettes.  She has a 30.00 pack-year smoking history. She has never used smokeless tobacco.   Alcohol:  reports previous alcohol use. Review of Systems:  General: Denies any fever or chills  EYES: Denies any diplopia  ENT: Tinnitus or vertigo  Resp: Denies any shortness of breath, cough or wheezing  Cardiac: Denies any chest pain, palpitations, claudication or edema  GI: Denies any melena, hematochezia, hematemesis or pyrosis  : Denies any frequency, urgency, hesitancy or incontinence  Musculoskeletal: Denies back pain, joint pain, myalgias  Heme: Denies bruising or bleeding easily  Endocrine: Denies any history of diabetes or thyroid disease  Psych: Denies anxiety or depression  Neuro: Denies any focal motor or sensory deficits      Physical Exam:  Vitals: /82   Pulse 82   Temp 99.1 °F (37.3 °C) (Temporal)   Resp 18   Ht 5' 5\" (1.651 m)   Wt 190 lb (86.2 kg)   SpO2 95%   BMI 31.62 kg/m²   CONSTITUTIONAL: Alert, appropriate, no acute distress. PSYCH: mood and affect are normal with a normal rate and tone of speech  EYES: Non icteric, EOM intact, pupils equal round and reactive to light  ENT: Mucus membranes moist, no oral pharyngeal lesions, nares patent   NECK: Supple, no masses, no JVD, trachea mid line   CHEST/LUNGS: CTA bilaterally, normal respiratory effort   CARDIOVASCULAR: RRR, no murmurs,  2+ DP and radial pulses bilaterally  ABDOMEN: soft, nontender  EXTREMITIES: warm, well perfused, no edema. Joint with mildly reduced range of motion and generalized tenderness.   Neurovascular exam normal  SKIN: warm, dry with no rashes or lesions  LYMPH: No cervical or inguinal lymphadenopathy    RADIOLOGY: xrays of extremity show right severe hip arthritis  LABORATORY:    CBC :    Lab Results   Component Value Date    WBC 7.2 01/10/2020    HGB 14.6 01/10/2020    HCT 45.6 01/10/2020     01/10/2020     BMP:   Lab Results   Component Value Date     01/10/2020    K 3.7 01/10/2020     01/10/2020    CO2 26 01/10/2020    BUN 15 01/10/2020    LABALBU 4.2 11/26/2019    CREATININE 0.8 01/10/2020    CALCIUM 8.8 01/10/2020    LABGLOM >60 01/10/2020    GLUCOSE 110 01/10/2020     PT/INR:    Lab Results   Component Value Date    PROTIME 12.2 01/10/2020    INR 0.96 01/10/2020     U/A:   Lab Results   Component Value Date    NITRITE neg 12/03/2019    WBCUA 1 01/10/2020    RBCUA 4 01/10/2020    BACTERIA NEGATIVE 01/10/2020     HgBA1c:  No components found for: HGBA1C    Assessment:   right severe primary hip osteoarthritis. Most recent office note reviewed and there has been no change in health status. PLAN: right Hip replacement. I explained to the patient/family the patient's diagnosis and operative procedure in detail. They said they understood basically what was wrong and how I planned to fix it. They understand the expected recovery and the risks which include excessive bleeding, infection, reaction to anesthesia, nerve injury, stiffness, fracture, deformity and dislocation. They then signed an operative consent form. Provider:  Veronica oJse  Date: 2/27/2020

## 2020-02-27 NOTE — CARE COORDINATION
PFM   Outside Record Received 11/26/19 [facility]   Outside Record Received 12/02/19 [facility]   Photo ID Received 01/10/20 exp 01-   Insurance Card Re[place] 01/10/[place]    Photo ID Received 01/10/20    Insurance Card Received 01/10/20    HIM ANDI Authorization Received 01/24/20 10/30/19 to 12/03/19 MFM   Advance Directive Assessment Received 02/27/20    Patient Photo   Photo of Patient   HIM Release of Information Output  (Deleted) 01/24/20 Requested records   Documents for the [facility] Consent Treat/HIPAA Received 01/10/20    IMM Medicare Not Received     IMM - Medicare Second Copy Given to Pt      Observation Notification Not Received     Coverage COB Information Received 01/10/20    Hospital Consent Treat/HIPAA Received 02/27/20    Hospital Consent Treat/HIPAA Received 02/27/20    Admission Information     Current Information     Attending Provider Admitting Provider Admission Type Admission Status   MD Chiquis Mathew MD Elective Admission (Confirmed)          Admission Date/Time Discharge Date Hospital Service Auth/Cert Status   46/72/16  06:11 AM  Med/Surg [address] [facility] Unit Room/Bed    [address] [address] [id number]           Admission     Complaint   M16.11   Hospital Account     Name Acct ID Class Status Primary Coverage   Bairon Elda [id number] Inpatient Open [facility]          Guarantor Account (for Hospital Account [de-identified])     Name Relation to Pt Service Area Active?  Acct Type   Bairon Elda [address] Yes Personal/Family   Address Phone     [address] [place], [place] )            Coverage Information (for Hospital Account [de-identified])     F/O Payor/Plan Precert #   CHACHO CASTILLO [facility] MEDICAID/ANTHEM [id number] #   Bairon Elda [id number]   Address Phone   [address] [phone] Medical Problems   Comment      Last edited by  on  at    Hospital Problem List   Date Reviewed: 12/3/2019      ICD-10-CM Priority Class Noted POA   Primary osteoarthritis of right hip M16.11   12/3/2019 Yes      Non-Hospital Problem List   Date Reviewed: 12/3/2019      ICD-10-CM Priority Class Noted   GERD (gastroesophageal reflux disease) K21.9   10/30/2019   Familial hyperlipidemia E78.49   10/30/2019   Psoriasis L40.9   10/30/2019   Chronic right hip pain M25.551, G89.29   10/30/2019   Chronic bilateral low back pain with right-sided sciatica M54.41, G89.29   10/30/2019   Cigarette nicotine dependence without complication N81.137   05/13/2192   Chronic insomnia F51.04   10/30/2019   Vitamin D deficiency E55.9   12/3/2019        Electronically signed by Michele Odonnell RN on 2/27/2020 at 1:53 PM

## 2020-02-27 NOTE — ANESTHESIA PRE PROCEDURE
Cigarette nicotine dependence without complication O62.484    Chronic insomnia F51.04    Vitamin D deficiency E55.9    Primary osteoarthritis of right hip M16.11       Past Medical History:        Diagnosis Date    Abnormal Pap smear of cervix     GERD (gastroesophageal reflux disease)     Hip pain     Hyperlipidemia     Nephrolithiasis     Primary osteoarthritis of right hip 12/3/2019    Psoriasis        Past Surgical History:        Procedure Laterality Date    CHOLECYSTECTOMY      HYSTERECTOMY      HYSTERECTOMY, VAGINAL      No BSO    LITHOTRIPSY         Social History:    Social History     Tobacco Use    Smoking status: Former Smoker     Packs/day: 1.00     Years: 30.00     Pack years: 30.00     Types: Cigarettes     Last attempt to quit: 2019     Years since quittin.2    Smokeless tobacco: Never Used   Substance Use Topics    Alcohol use: Not Currently                                Counseling given: Not Answered      Vital Signs (Current):   Vitals:    20 0636   BP: 125/82   Pulse: 82   Resp: 18   Temp: 99.1 °F (37.3 °C)   TempSrc: Temporal   SpO2: 95%   Weight: 190 lb (86.2 kg)   Height: 5' 5\" (1.651 m)                                              BP Readings from Last 3 Encounters:   20 125/82   19 110/70   10/30/19 116/72       NPO Status: Time of last liquid consumption: 0000                        Time of last solid consumption: 0000                        Date of last liquid consumption: 20                        Date of last solid food consumption: 20    BMI:   Wt Readings from Last 3 Encounters:   20 190 lb (86.2 kg)   01/10/20 190 lb (86.2 kg)   19 189 lb (85.7 kg)     Body mass index is 31.62 kg/m².     CBC:   Lab Results   Component Value Date    WBC 7.2 01/10/2020    RBC 4.78 01/10/2020    HGB 14.6 01/10/2020    HCT 45.6 01/10/2020    MCV 95.4 01/10/2020    RDW 12.9 01/10/2020     01/10/2020       CMP:   Lab Results Component Value Date     01/10/2020    K 3.7 01/10/2020     01/10/2020    CO2 26 01/10/2020    BUN 15 01/10/2020    CREATININE 0.8 01/10/2020    LABGLOM >60 01/10/2020    GLUCOSE 110 01/10/2020    PROT 6.9 11/26/2019    CALCIUM 8.8 01/10/2020    BILITOT 0.3 11/26/2019    ALKPHOS 73 11/26/2019    AST 11 11/26/2019    ALT 7 11/26/2019       POC Tests: No results for input(s): POCGLU, POCNA, POCK, POCCL, POCBUN, POCHEMO, POCHCT in the last 72 hours. Coags:   Lab Results   Component Value Date    PROTIME 12.2 01/10/2020    INR 0.96 01/10/2020    APTT 26.1 01/10/2020       HCG (If Applicable): No results found for: PREGTESTUR, PREGSERUM, HCG, HCGQUANT     ABGs: No results found for: PHART, PO2ART, PJA6XNH, XMT1LLQ, BEART, Y8LXQPBV     Type & Screen (If Applicable):  No results found for: LABABO, [address]    Anesthesia Evaluation  Patient summary reviewed and Nursing notes reviewed   history of anesthetic complications: PONV. Airway: Mallampati: II  TM distance: >3 FB   Neck ROM: full  Mouth opening: > = 3 FB Dental:          Pulmonary:Negative Pulmonary ROS and normal exam        (-) COPD and not a current smoker                           Cardiovascular:Negative CV ROS    (+) hyperlipidemia    (-) CAD    ECG reviewed                        Neuro/Psych:   (+) seizures:,    (-) CVA           GI/Hepatic/Renal:   (+) GERD: well controlled,           Endo/Other:    (+) : arthritis:., .                 Abdominal:           Vascular: negative vascular ROS. Anesthesia Plan      spinal     ASA 2     (H/o postdural puncture headache with epidural in 1991)      MIPS: Postoperative opioids intended and Prophylactic antiemetics administered. Anesthetic plan and risks discussed with patient. Plan discussed with CRNA.                   Jarvis Jiang MD   2/27/2020

## 2020-02-27 NOTE — ANESTHESIA PROCEDURE NOTES
Spinal Block    Patient location during procedure: OR  Start time: 2/27/2020 8:40 AM  End time: 2/27/2020 8:49 AM  Reason for block: primary anesthetic  Staffing  Resident/CRNA: JAEL Lopez CRNA  Performed: resident/CRNA   Preanesthetic Checklist  Completed: patient identified, site marked, surgical consent, pre-op evaluation, timeout performed, IV checked, risks and benefits discussed, monitors and equipment checked, anesthesia consent given, oxygen available and patient being monitored  Spinal Block  Patient position: sitting  Prep: Betadine  Patient monitoring: continuous pulse ox and frequent blood pressure checks  Approach: midline  Location: L3/L4  Provider prep: mask and sterile gloves  Local infiltration: lidocaine  Agent: bupivacaine  Dose: 2  Dose: 2  Needle  Needle type: Pencan   Needle gauge: 24 G  Needle length: 4 in  Assessment  Sensory level: T8  Swirl obtained: Yes  CSF: clear  Attempts: 2  Hemodynamics: stable  Additional Notes  First attempt L4/5 unsuccessful. Second attempt L3/4 successful.

## 2020-02-28 LAB
ANION GAP SERPL CALCULATED.3IONS-SCNC: 8 MMOL/L (ref 7–19)
BUN BLDV-MCNC: 11 MG/DL (ref 6–20)
CALCIUM SERPL-MCNC: 7.9 MG/DL (ref 8.6–10)
CHLORIDE BLD-SCNC: 103 MMOL/L (ref 98–111)
CO2: 28 MMOL/L (ref 22–29)
CREAT SERPL-MCNC: 0.6 MG/DL (ref 0.5–0.9)
GFR NON-AFRICAN AMERICAN: >60
GLUCOSE BLD-MCNC: 137 MG/DL (ref 74–109)
HCT VFR BLD CALC: 21.1 % (ref 37–47)
HEMOGLOBIN: 7 G/DL (ref 12–16)
POTASSIUM REFLEX MAGNESIUM: 4.3 MMOL/L (ref 3.5–5)
SODIUM BLD-SCNC: 139 MMOL/L (ref 136–145)

## 2020-02-28 PROCEDURE — 6370000000 HC RX 637 (ALT 250 FOR IP): Performed by: ORTHOPAEDIC SURGERY

## 2020-02-28 PROCEDURE — 85018 HEMOGLOBIN: CPT

## 2020-02-28 PROCEDURE — 97116 GAIT TRAINING THERAPY: CPT

## 2020-02-28 PROCEDURE — 2580000003 HC RX 258: Performed by: ANESTHESIOLOGY

## 2020-02-28 PROCEDURE — 1210000000 HC MED SURG R&B

## 2020-02-28 PROCEDURE — 97161 PT EVAL LOW COMPLEX 20 MIN: CPT

## 2020-02-28 PROCEDURE — 2580000003 HC RX 258: Performed by: ORTHOPAEDIC SURGERY

## 2020-02-28 PROCEDURE — 97165 OT EVAL LOW COMPLEX 30 MIN: CPT

## 2020-02-28 PROCEDURE — 97530 THERAPEUTIC ACTIVITIES: CPT

## 2020-02-28 PROCEDURE — 80048 BASIC METABOLIC PNL TOTAL CA: CPT

## 2020-02-28 PROCEDURE — 85014 HEMATOCRIT: CPT

## 2020-02-28 PROCEDURE — 36415 COLL VENOUS BLD VENIPUNCTURE: CPT

## 2020-02-28 PROCEDURE — 6360000002 HC RX W HCPCS: Performed by: ORTHOPAEDIC SURGERY

## 2020-02-28 RX ADMIN — ACETAMINOPHEN 650 MG: 325 TABLET ORAL at 21:06

## 2020-02-28 RX ADMIN — SODIUM CHLORIDE, POTASSIUM CHLORIDE, SODIUM LACTATE AND CALCIUM CHLORIDE: 600; 310; 30; 20 INJECTION, SOLUTION INTRAVENOUS at 07:11

## 2020-02-28 RX ADMIN — MORPHINE SULFATE 2 MG: 4 INJECTION, SOLUTION INTRAMUSCULAR; INTRAVENOUS at 15:35

## 2020-02-28 RX ADMIN — CARBOXYMETHYLCELLULOSE SODIUM 1 DROP: 5 SOLUTION/ DROPS OPHTHALMIC at 15:36

## 2020-02-28 RX ADMIN — SENNOSIDES AND DOCUSATE SODIUM 1 TABLET: 8.6; 5 TABLET ORAL at 21:06

## 2020-02-28 RX ADMIN — OXYCODONE 10 MG: 5 TABLET ORAL at 12:32

## 2020-02-28 RX ADMIN — PRAVASTATIN SODIUM 10 MG: 10 TABLET ORAL at 08:46

## 2020-02-28 RX ADMIN — SODIUM CHLORIDE, PRESERVATIVE FREE 10 ML: 5 INJECTION INTRAVENOUS at 21:06

## 2020-02-28 RX ADMIN — OXYCODONE 10 MG: 5 TABLET ORAL at 08:47

## 2020-02-28 RX ADMIN — ACETAMINOPHEN 650 MG: 325 TABLET ORAL at 15:35

## 2020-02-28 RX ADMIN — CARBOXYMETHYLCELLULOSE SODIUM 1 DROP: 5 SOLUTION/ DROPS OPHTHALMIC at 08:47

## 2020-02-28 RX ADMIN — ASPIRIN 325 MG: 325 TABLET, COATED ORAL at 21:06

## 2020-02-28 RX ADMIN — ASPIRIN 325 MG: 325 TABLET, COATED ORAL at 08:46

## 2020-02-28 RX ADMIN — ACETAMINOPHEN 650 MG: 325 TABLET ORAL at 00:57

## 2020-02-28 RX ADMIN — ACETAMINOPHEN 650 MG: 325 TABLET ORAL at 08:50

## 2020-02-28 RX ADMIN — CARBOXYMETHYLCELLULOSE SODIUM 1 DROP: 5 SOLUTION/ DROPS OPHTHALMIC at 21:06

## 2020-02-28 RX ADMIN — Medication 2 G: at 00:57

## 2020-02-28 RX ADMIN — OXYCODONE 10 MG: 5 TABLET ORAL at 04:36

## 2020-02-28 RX ADMIN — PANTOPRAZOLE SODIUM 40 MG: 40 TABLET, DELAYED RELEASE ORAL at 08:51

## 2020-02-28 RX ADMIN — SODIUM CHLORIDE, PRESERVATIVE FREE 10 ML: 5 INJECTION INTRAVENOUS at 08:48

## 2020-02-28 RX ADMIN — SENNOSIDES AND DOCUSATE SODIUM 1 TABLET: 8.6; 5 TABLET ORAL at 08:47

## 2020-02-28 RX ADMIN — OXYCODONE 10 MG: 5 TABLET ORAL at 18:49

## 2020-02-28 RX ADMIN — MORPHINE SULFATE 4 MG: 4 INJECTION, SOLUTION INTRAMUSCULAR; INTRAVENOUS at 21:08

## 2020-02-28 ASSESSMENT — PAIN DESCRIPTION - PAIN TYPE
TYPE: SURGICAL PAIN

## 2020-02-28 ASSESSMENT — PAIN DESCRIPTION - ONSET
ONSET: SUDDEN
ONSET: GRADUAL

## 2020-02-28 ASSESSMENT — PAIN DESCRIPTION - DIRECTION: RADIATING_TOWARDS: KNEE

## 2020-02-28 ASSESSMENT — PAIN SCALES - GENERAL
PAINLEVEL_OUTOF10: 10
PAINLEVEL_OUTOF10: 10
PAINLEVEL_OUTOF10: 9
PAINLEVEL_OUTOF10: 5
PAINLEVEL_OUTOF10: 10
PAINLEVEL_OUTOF10: 2
PAINLEVEL_OUTOF10: 10
PAINLEVEL_OUTOF10: 4
PAINLEVEL_OUTOF10: 10

## 2020-02-28 ASSESSMENT — PAIN - FUNCTIONAL ASSESSMENT
PAIN_FUNCTIONAL_ASSESSMENT: PREVENTS OR INTERFERES SOME ACTIVE ACTIVITIES AND ADLS
PAIN_FUNCTIONAL_ASSESSMENT: PREVENTS OR INTERFERES SOME ACTIVE ACTIVITIES AND ADLS

## 2020-02-28 ASSESSMENT — PAIN DESCRIPTION - ORIENTATION
ORIENTATION: RIGHT

## 2020-02-28 ASSESSMENT — PAIN DESCRIPTION - DESCRIPTORS
DESCRIPTORS: ACHING;DULL
DESCRIPTORS: ACHING;DISCOMFORT

## 2020-02-28 ASSESSMENT — PAIN DESCRIPTION - LOCATION
LOCATION: HIP

## 2020-02-28 ASSESSMENT — PAIN DESCRIPTION - PROGRESSION
CLINICAL_PROGRESSION: RAPIDLY IMPROVING
CLINICAL_PROGRESSION: GRADUALLY IMPROVING

## 2020-02-28 ASSESSMENT — PAIN DESCRIPTION - FREQUENCY
FREQUENCY: CONTINUOUS
FREQUENCY: INTERMITTENT

## 2020-02-29 VITALS
HEIGHT: 65 IN | BODY MASS INDEX: 31.65 KG/M2 | WEIGHT: 190 LBS | TEMPERATURE: 98.8 F | DIASTOLIC BLOOD PRESSURE: 65 MMHG | OXYGEN SATURATION: 96 % | HEART RATE: 112 BPM | RESPIRATION RATE: 18 BRPM | SYSTOLIC BLOOD PRESSURE: 104 MMHG

## 2020-02-29 LAB
ANION GAP SERPL CALCULATED.3IONS-SCNC: 9 MMOL/L (ref 7–19)
BLOOD BANK DISPENSE STATUS: NORMAL
BLOOD BANK PRODUCT CODE: NORMAL
BPU ID: NORMAL
BUN BLDV-MCNC: 7 MG/DL (ref 6–20)
CALCIUM SERPL-MCNC: 7.9 MG/DL (ref 8.6–10)
CHLORIDE BLD-SCNC: 104 MMOL/L (ref 98–111)
CO2: 29 MMOL/L (ref 22–29)
CREAT SERPL-MCNC: 0.5 MG/DL (ref 0.5–0.9)
DESCRIPTION BLOOD BANK: NORMAL
GFR NON-AFRICAN AMERICAN: >60
GLUCOSE BLD-MCNC: 101 MG/DL (ref 74–109)
HCT VFR BLD CALC: 20.2 % (ref 37–47)
HCT VFR BLD CALC: 22.8 % (ref 37–47)
HEMOGLOBIN: 6.5 G/DL (ref 12–16)
HEMOGLOBIN: 7.6 G/DL (ref 12–16)
POTASSIUM REFLEX MAGNESIUM: 3.6 MMOL/L (ref 3.5–5)
SODIUM BLD-SCNC: 142 MMOL/L (ref 136–145)

## 2020-02-29 PROCEDURE — 85014 HEMATOCRIT: CPT

## 2020-02-29 PROCEDURE — 36430 TRANSFUSION BLD/BLD COMPNT: CPT

## 2020-02-29 PROCEDURE — 6370000000 HC RX 637 (ALT 250 FOR IP): Performed by: ORTHOPAEDIC SURGERY

## 2020-02-29 PROCEDURE — 2580000003 HC RX 258: Performed by: ORTHOPAEDIC SURGERY

## 2020-02-29 PROCEDURE — 36415 COLL VENOUS BLD VENIPUNCTURE: CPT

## 2020-02-29 PROCEDURE — 85018 HEMOGLOBIN: CPT

## 2020-02-29 PROCEDURE — 80048 BASIC METABOLIC PNL TOTAL CA: CPT

## 2020-02-29 PROCEDURE — 97116 GAIT TRAINING THERAPY: CPT

## 2020-02-29 PROCEDURE — 97530 THERAPEUTIC ACTIVITIES: CPT

## 2020-02-29 RX ORDER — 0.9 % SODIUM CHLORIDE 0.9 %
20 INTRAVENOUS SOLUTION INTRAVENOUS ONCE
Status: COMPLETED | OUTPATIENT
Start: 2020-02-29 | End: 2020-02-29

## 2020-02-29 RX ORDER — ASPIRIN 81 MG/1
81 TABLET ORAL 2 TIMES DAILY
Qty: 60 TABLET | Refills: 0 | Status: SHIPPED | OUTPATIENT
Start: 2020-02-29 | End: 2020-11-03

## 2020-02-29 RX ORDER — OXYCODONE HYDROCHLORIDE 5 MG/1
5 TABLET ORAL EVERY 4 HOURS PRN
Qty: 30 TABLET | Refills: 0 | Status: SHIPPED | OUTPATIENT
Start: 2020-02-29 | End: 2020-03-03

## 2020-02-29 RX ADMIN — SODIUM CHLORIDE, PRESERVATIVE FREE 10 ML: 5 INJECTION INTRAVENOUS at 08:46

## 2020-02-29 RX ADMIN — CARBOXYMETHYLCELLULOSE SODIUM 1 DROP: 5 SOLUTION/ DROPS OPHTHALMIC at 08:45

## 2020-02-29 RX ADMIN — SENNOSIDES AND DOCUSATE SODIUM 1 TABLET: 8.6; 5 TABLET ORAL at 08:45

## 2020-02-29 RX ADMIN — SODIUM CHLORIDE 20 ML: 9 INJECTION, SOLUTION INTRAVENOUS at 06:15

## 2020-02-29 RX ADMIN — PANTOPRAZOLE SODIUM 40 MG: 40 TABLET, DELAYED RELEASE ORAL at 06:18

## 2020-02-29 RX ADMIN — ASPIRIN 325 MG: 325 TABLET, COATED ORAL at 08:45

## 2020-02-29 RX ADMIN — OXYCODONE 10 MG: 5 TABLET ORAL at 04:27

## 2020-02-29 RX ADMIN — PRAVASTATIN SODIUM 10 MG: 10 TABLET ORAL at 08:45

## 2020-02-29 RX ADMIN — OXYCODONE 10 MG: 5 TABLET ORAL at 08:45

## 2020-02-29 RX ADMIN — MAGNESIUM HYDROXIDE 30 ML: 400 SUSPENSION ORAL at 08:45

## 2020-02-29 RX ADMIN — ACETAMINOPHEN 650 MG: 325 TABLET ORAL at 06:18

## 2020-02-29 ASSESSMENT — PAIN SCALES - GENERAL
PAINLEVEL_OUTOF10: 8
PAINLEVEL_OUTOF10: 6
PAINLEVEL_OUTOF10: 7
PAINLEVEL_OUTOF10: 10

## 2020-02-29 NOTE — PROGRESS NOTES
Physical Therapy    [facility]      Vladislav Copeland    : 1970  MRN: [id number]   PHYSICIAN:  Marcus Andrews,*  Primary Problem    Patient Active Problem List   Diagnosis    GERD (gastroesophageal reflux disease)    Familial hyperlipidemia    Psoriasis    Chronic right hip pain    Chronic bilateral low back pain with right-sided sciatica    Cigarette nicotine dependence without complication    Chronic insomnia    Vitamin D deficiency    Primary osteoarthritis of right hip       Rehabilitation Diagnosis:     Primary osteoarthritis of right hip [M16.11]       SERVICE DATE: 2020    DIAGNOSIS:     [] Left Total hip   [] Left Total knee   [x] Right Total hip   [] Right total knee    SUBJECTIVE: Agrees to work with therapy      PRIOR LEVEL OF FUNCTION:    [x] Independent in community no assistive device     [] Independent in community with assistive device     [] Independent in the house with assistive device     [] Transfer only    []     OBJECTIVE:  Orientation        [x] Within normal limits       [] Follows directions one step at a time    [] Unable to follow directions    [] Unable to participate in therapy    ROM     [] Active     [x] Passive     HIP   LEFT:   [x] WFL Flexion:  Extension:      RIGHT: [] WFL Flexion: 75 Extension: 0    KNEE   LEFT:   [x] WFL Flexion:  Extension:   RIGHT: [x] WFL Flexion:  Extension:     STRENGTH    HIP   LEFT:   [x] WFL [] Grossly 3-/5     RIGHT: [] WFL [x] Grossly 3-/5    KNEE   LEFT:   [x] WFL [] Grossly 3-/5   RIGHT: [] WFL [x] Grossly 3-/5     TRANSFERS   Sit to stand     [x] CGA   [] Independent [] Modified Independent [] Stand by / Supervision   [] Minimum  [] Moderate   [] Maximum      Bed to chair     [x] CGA   [] Independent [] Modified Independent [] Stand by / Supervision   [] Minimum  [] Moderate   [] Maximum      Bed mobility   Supine to sit     [] Independent [] Modified Independent [] Stand by / Supervision   [x]
Spoke with Tino Merritt at [facility] [place] and notified of discharge. Discharge summary faxed.     Electronically signed by William Isbell RN on 2/29/2020 at 10:00 AM
Subjective:     Post-Operative Day: 2 No complaints    Objective:     Patient Vitals for the past 24 hrs:   BP Temp Temp src Pulse Resp SpO2   02/29/20 0825 106/71 98.7 °F (37.1 °C) Temporal 108 16 92 %   02/29/20 0629 116/75 98.9 °F (37.2 °C) Temporal 106 18 91 %   02/29/20 0607 101/70 99.3 °F (37.4 °C) Temporal 107 16 96 %   02/29/20 0210 102/62 99.1 °F (37.3 °C) Temporal 122 18 93 %   02/28/20 2231 100/60 98.2 °F (36.8 °C) Temporal 103 18 92 %   02/28/20 2022 109/66 99.1 °F (37.3 °C) -- 106 16 94 %   02/28/20 1504 108/64 97.9 °F (36.6 °C) Temporal 83 18 97 %   02/28/20 1057 97/65 98.3 °F (36.8 °C) Temporal 82 18 92 %       General: Alert cooperative   Wound: Clean dry intact. Moderate swelling   Neurovascular: Exam normal   DVT Exam: negative         Data Review:  Recent Labs     02/29/20  0417 02/29/20  0839   HGB 6.5* 7.6*     Recent Labs     02/29/20  0417      K 3.6   CREATININE 0.5   GLUCOSE 101     No results for input(s): POCGLU in the last 72 hours. XR HIP 2-3 VW W PELVIS RIGHT   Final Result   Postoperative change of RIGHT hip arthroplasty without evidence of   complication. Signed by Dr Fam Rosario on 2/27/2020 12:29 PM      FLUORO FOR SURGICAL PROCEDURES    (Results Pending)   drain 50 100 40      Assessment:     Status Post right Total Hip Arthroplasty. Doing well postop without complications . Acute postoperative blood loss anemia being monitored with daily hemoglobin/hematocrit. Received a blood transfusion this morning-- ordered by on call ortho.      Plan:     Pain control  Keep accuchecks under 200  PT/OT  DVT prophylaxis  Ice and elevate  Discharge Home
Tub/Shower unit  [employer]: Bedside commode  Bathroom Equipment: Grab bars in shower  Home Equipment: Rolling walker  ADL Assistance: Independent  Ambulation Assistance: Independent  Transfer Assistance: Independent       Objective   Vision: Within Functional Limits  Hearing: Within functional limits    Orientation  Overall Orientation Status: Within Normal Limits        ADL  Feeding: Independent  Grooming: Independent  UE Bathing: Supervision  LE Bathing: Minimal assistance  UE Dressing: Supervision  LE Dressing: Minimal assistance  Toileting: Supervision           Transfers  Stand Step Transfers: Contact guard assistance  Sit to stand: Contact guard assistance  Transfer Comments: r.w.      Cognition  Overall Cognitive Status: WNL                 LUE AROM (degrees)  LUE AROM : WNL  RUE AROM (degrees)  RUE AROM : WNL  LUE Strength  Gross LUE Strength: WFL  RUE Strength  Gross RUE Strength: WFL                   Plan   Plan  Times per week: 3-5x/week  Times per day: Daily    G-Code     OutComes Score                                                  AM-PAC Score             Goals  Short term goals  Time Frame for Short term goals: 1 week  Short term goal 1: Supervision with toilet tfers  Short term goal 2: Supervision with light ambulatory ADLs  Short term goal 3: Supervision with clothing mgmt in standing  Long term goals  Long term goal 1: Return to PLOF       Therapy Time   Individual Concurrent Group Co-treatment   Time In           Time Out           Minutes                   Logan Charles OTR/L
hemoglobin/hematocrit.     Plan:   Dc drain  Pain control  Keep accuchecks under 200  PT/OT  DVT prophylaxis  Ice and elevate  Discharge Home tomorrow

## 2020-03-02 ENCOUNTER — TELEPHONE (OUTPATIENT)
Dept: INTERNAL MEDICINE | Age: 50
End: 2020-03-02

## 2020-03-02 NOTE — TELEPHONE ENCOUNTER
A[facility] Transitions Initial Follow Up Call    Outreach made within 2 business days of discharge: Yes    Patient: Denver Overman Patient : 1970   MRN: [id number]  Reason for Admission: Admitted 20 for primary osteoarthritis of the right hip   Discharge Date: 20    Discharge Diagnosis  right Hip Replacement     Spoke with: patient     Discharge department/facility: [facility]     [facility] Interactive Patient Contact:  Was patient able to fill all prescriptions: Yes  Was patient instructed to bring all medications to the follow-up visit: Yes  Is patient taking all medications as directed in the discharge summary? Yes  Does patient understand their discharge instructions: Yes  Does patient have questions or concerns that need addressed prior to 7-14 day follow up office visit: no    I spoke with Mrs. Ana Reynaga, she is home and recovering. She states she is still in a lot pain. She is ambulating with assist of a walker. Her bladder is moving ok, however she is constipated. She states her incision looks good. Home health was there today to start therapy. She denies any needs at this time. She requested her HFU appointment be moved out to around 10 days post discharge. Appointment scheduled.      Scheduled appointment with PCP within 7-14 days    Follow Up  Future Appointments   Date Time Provider Nick Florence   3/11/2020 12:45 PM JAEL Rajan [address]

## 2020-03-04 ENCOUNTER — TELEPHONE (OUTPATIENT)
Dept: INPATIENT UNIT | Age: 50
End: 2020-03-04

## 2020-09-29 ENCOUNTER — OFFICE VISIT (OUTPATIENT)
Dept: FAMILY MEDICINE CLINIC | Age: 50
End: 2020-09-29
Payer: MEDICAID

## 2020-09-29 ENCOUNTER — HOSPITAL ENCOUNTER (OUTPATIENT)
Dept: GENERAL RADIOLOGY | Age: 50
Discharge: HOME | End: 2020-09-29
Payer: MEDICAID

## 2020-09-29 VITALS
BODY MASS INDEX: 35.32 KG/M2 | WEIGHT: 212 LBS | OXYGEN SATURATION: 96 % | HEIGHT: 65 IN | RESPIRATION RATE: 18 BRPM | SYSTOLIC BLOOD PRESSURE: 110 MMHG | DIASTOLIC BLOOD PRESSURE: 70 MMHG | TEMPERATURE: 97.7 F | HEART RATE: 87 BPM

## 2020-09-29 PROCEDURE — 72070 X-RAY EXAM THORAC SPINE 2VWS: CPT

## 2020-09-29 PROCEDURE — 99214 OFFICE O/P EST MOD 30 MIN: CPT | Performed by: NURSE PRACTITIONER

## 2020-09-29 PROCEDURE — 72100 X-RAY EXAM L-S SPINE 2/3 VWS: CPT

## 2020-09-29 PROCEDURE — 72040 X-RAY EXAM NECK SPINE 2-3 VW: CPT

## 2020-09-29 RX ORDER — TRIAMCINOLONE ACETONIDE 1 MG/G
CREAM TOPICAL
Qty: 30 G | Refills: 2 | Status: SHIPPED
Start: 2020-09-29 | End: 2020-11-03 | Stop reason: ALTCHOICE

## 2020-09-29 RX ORDER — IBUPROFEN 800 MG/1
800 TABLET ORAL
Qty: 90 TABLET | Refills: 3 | Status: SHIPPED
Start: 2020-09-29 | End: 2020-11-03 | Stop reason: ALTCHOICE

## 2020-09-29 RX ORDER — MELOXICAM 15 MG/1
TABLET ORAL
COMMUNITY
Start: 2020-08-31 | End: 2020-09-29 | Stop reason: ALTCHOICE

## 2020-09-29 RX ORDER — TIZANIDINE 4 MG/1
4 TABLET ORAL EVERY 8 HOURS PRN
Qty: 60 TABLET | Refills: 2 | Status: SHIPPED | OUTPATIENT
Start: 2020-09-29 | End: 2020-11-03

## 2020-09-29 ASSESSMENT — ENCOUNTER SYMPTOMS
NAUSEA: 0
SHORTNESS OF BREATH: 0
COUGH: 0
SORE THROAT: 0
BACK PAIN: 1
WHEEZING: 0
DIARRHEA: 0
ABDOMINAL PAIN: 0
CHEST TIGHTNESS: 0

## 2020-09-29 NOTE — PROGRESS NOTES
Eula Rubi is a 51 y.o. female who presents today for  Chief Complaint   Patient presents with    Back Pain    Neck Pain       HPI:  She had R JEANNE in Feb per Dr. Melani Hobson, no complications. Pain has improved but has developed pain in R foot. He referred her to Dr. Robert Suarez for this. She has chronic neck and back pain, worse progressively. Has h/o lumbar DDD. No recent imaging. Pain is across lower back and radiates to buttocks L>R. She has occasional leg weakness. She has mid back pain as well. Neck pain starts at base of neck, radiates up to base of skull, also to shoulders. She occasionally has tightness and numbness/tingling in upper arms. Standing for prolonged periods makes neck and back pain worse. Some increased pain with neck/head movement. She is taking ibuprofen 800 mg tid. Also started meloxicam 15 mg daily 3 weeks ago per Dr. Robert Suarez for R foot pain. She has a h/o psoriasis. Currently has breakout to legs, requests cream.    Review of Systems   Constitutional: Negative for chills and fever. HENT: Negative for congestion, ear pain and sore throat. Respiratory: Negative for cough, chest tightness, shortness of breath and wheezing. Cardiovascular: Negative for chest pain. Gastrointestinal: Negative for abdominal pain, diarrhea and nausea. Musculoskeletal: Positive for arthralgias (R foot), back pain and neck pain. Negative for myalgias. Skin: Negative for rash. Neurological: Positive for numbness (upper arms with neck pain).        Past Medical History:   Diagnosis Date    Abnormal Pap smear of cervix     GERD (gastroesophageal reflux disease)     Hip pain     Hyperlipidemia     Nephrolithiasis     Primary osteoarthritis of right hip 12/3/2019    Psoriasis        Current Outpatient Medications   Medication Sig Dispense Refill    tiZANidine (ZANAFLEX) 4 MG tablet Take 1 tablet by mouth every 8 hours as needed (pain) 60 tablet 2    ibuprofen (ADVIL;MOTRIN) Constitutional:       General: She is not in acute distress. Appearance: Normal appearance. She is well-developed. HENT:      Head: Normocephalic. Eyes:      Conjunctiva/sclera: Conjunctivae normal.      Pupils: Pupils are equal, round, and reactive to light. Neck:      Musculoskeletal: Normal range of motion and neck supple. Thyroid: No thyromegaly. Vascular: No carotid bruit or JVD. Trachea: No tracheal deviation. Cardiovascular:      Rate and Rhythm: Normal rate and regular rhythm. Heart sounds: Normal heart sounds. No murmur. Pulmonary:      Effort: Pulmonary effort is normal. No respiratory distress. Breath sounds: Normal breath sounds. No wheezing or rhonchi. Musculoskeletal: Normal range of motion. General: Tenderness present. Comments: Point tenderness across lower back, mid back, neck. Straight leg exam positive bilaterally around 45 degrees, worse on L side   Lymphadenopathy:      Cervical: No cervical adenopathy. Skin:     General: Skin is warm and dry. Neurological:      Mental Status: She is alert. Psychiatric:         Mood and Affect: Mood normal.         Behavior: Behavior normal.         Thought Content: Thought content normal.         ASSESSMENT/PLAN:  1. Chronic bilateral low back pain with bilateral sciatica  - Lumbar XR today  - She feels ibuprofen is more effective. Discussed that she does not need to take this with meloxicam.  Discontinue meloxicam.  - Tizanidine 2-3 times daily as needed. Discussed SE profile, potential sedation.  - May need further imaging/MRI and/or referral to orthospine or neurosurgery  - XR LUMBAR SPINE (2-3 VIEWS); Future    2. DDD (degenerative disc disease), lumbar  - As above  - XR LUMBAR SPINE (2-3 VIEWS); Future    3. Mid back pain  - Thoracic XR, plan as above  - XR THORACIC SPINE (2 VIEWS); Future    4. Neck pain  - Cervical spine XR, plan as above  - XR CERVICAL SPINE (2-3 VIEWS); Future    5.

## 2020-09-30 ENCOUNTER — TELEPHONE (OUTPATIENT)
Dept: NEUROSURGERY | Age: 50
End: 2020-09-30

## 2020-09-30 NOTE — TELEPHONE ENCOUNTER
[facility] New Patient Questionnaire    1. Diagnosis/Reason for Referral?        2. Who is completing questionnaire? Patient  Caregiver Family      3. Has the patient had any previous spinal/brain surgeries?  no      A. If yes, what is the name of the facility in which the surgery was performed? B. Procedure/Surgery performed? C. Who was the surgeon? D. When was the surgery? MM/YY       E. Did the patient improve after the surgery? 4. Is this a second opinion?              no    5. Have MRI Images been obtain within the last year? Yes   <-- No      XR<---  CT     If yes, where was the imaging performed? If yes, what part of the body? Lumbar  <--  Cervical  Thoracic  Brain     If yes, when was it obtained? recently    Note: if the scan was performed at a facility other than [address] [place] [address], the disc will need to be brought to the appointment or we need to reach out to obtain the disc. A. Was the patient instructed to provide the disc? Yes   No  <--      8. Has the patient had a NCV/EMG within the last year? Yes  No <--     If yes, where was it performed and date? MM/YY  Location:      9. Has the patient been to Physical Therapy? Yes  No  <--     If yes, what location, how long attended, and last visit? Location:        Therapy Lasted:    Date of Last Visit:      10. Has the patient been to Pain Management? Yes  No  <--     If yes, what location and last visit     Location:   Last Visit:   Is it helping?

## 2020-10-07 ENCOUNTER — OFFICE VISIT (OUTPATIENT)
Dept: NEUROSURGERY | Age: 50
End: 2020-10-07
Payer: MEDICAID

## 2020-10-07 VITALS
HEART RATE: 72 BPM | SYSTOLIC BLOOD PRESSURE: 131 MMHG | WEIGHT: 200 LBS | HEIGHT: 65 IN | BODY MASS INDEX: 33.32 KG/M2 | DIASTOLIC BLOOD PRESSURE: 87 MMHG

## 2020-10-07 PROBLEM — M50.30 DEGENERATIVE DISC DISEASE, CERVICAL: Status: ACTIVE | Noted: 2020-10-07

## 2020-10-07 PROBLEM — G89.29 CHRONIC NECK PAIN: Status: ACTIVE | Noted: 2020-10-07

## 2020-10-07 PROBLEM — M51.36 DEGENERATIVE DISC DISEASE, LUMBAR: Status: ACTIVE | Noted: 2020-10-07

## 2020-10-07 PROBLEM — M54.2 CHRONIC NECK PAIN: Status: ACTIVE | Noted: 2020-10-07

## 2020-10-07 PROBLEM — M51.369 DEGENERATIVE DISC DISEASE, LUMBAR: Status: ACTIVE | Noted: 2020-10-07

## 2020-10-07 PROBLEM — G89.29 CHRONIC MIDLINE THORACIC BACK PAIN: Status: ACTIVE | Noted: 2020-10-07

## 2020-10-07 PROBLEM — M51.34 DEGENERATIVE DISC DISEASE, THORACIC: Status: ACTIVE | Noted: 2020-10-07

## 2020-10-07 PROBLEM — M54.6 CHRONIC MIDLINE THORACIC BACK PAIN: Status: ACTIVE | Noted: 2020-10-07

## 2020-10-07 PROBLEM — M54.50 CHRONIC BILATERAL LOW BACK PAIN WITHOUT SCIATICA: Status: ACTIVE | Noted: 2019-10-30

## 2020-10-07 PROCEDURE — 99204 OFFICE O/P NEW MOD 45 MIN: CPT | Performed by: NEUROLOGICAL SURGERY

## 2020-10-07 NOTE — PROGRESS NOTES
[facility]  Office Visit        Chief Complaint   Patient presents with    Consultation     back pain. imaging in chart. HISTORY OF PRESENT ILLNESS:      The patient is a 51 y.o. female who presents for neurosurgical evaluation of chronic neck and back pain. She states the has been a progressive problem over many years. She does have a history of osteoarthritis and has recently undergone a right total hip arthroplasty with Dr. Jairo Renner. She describes pain in her cervical, thoracic and lumbar spine. She states the lumbar back pain is most bothersome to her. She describes midline and paraspinal back pain with occasional radiation of pain into her left buttock. She vaguely describe occasional pain radiating lower in her legs but this is infrequent and she cannot describe a specific pattern to the pain. She endorses occasional tingling in her legs but again cannot describe a specific pattern or distribution of the tingling. She denies focal weakness and feels that her walking has improved since her hip surgery. She denies numbness or weakness in her legs with walking. Regarding her thoracic pain, she describes mostly mid-thoracic paraspinal and interscapular back pain. She denies radiation of pain into her chest or upper abdomen. She denies sensory loss in her chest or upper abdomen. With respect to her neck pain, she describes chronic pain in her neck and suboccipital headaches. She has some occasional cramping and burning pain in her biceps and triceps. She denies arm weakness or numbness or tingling in her hands. She denies any loss of fine motor control but does endorse difficulty with her hands due to arthritis. She denies any loss of balance or difficulty with bowel or bladder control. She is currently treating her pain with ibuprofen and tizanidine. She has not done any physical therapy for her back or neck, she has not seen pain management or had injections.   She has no High Cholesterol Mother     Hypertension Father     Other Father         GERD, nephrolithiasis    High Cholesterol Father     Diabetes type 2  Father     Coronary Art Dis Father     High Blood Pressure Sister     Other Sister         GERD       REVIEW OF SYSTEMS:    Constitutional: Negative. HENT: Negative. Eyes: Negative. Respiratory: Negative. Cardiovascular: Negative. Gastrointestinal: Positive for heartburn. Genitourinary: Negative. Musculoskeletal: Positive for back pain, joint pain and neck pain. Skin: Negative. Neurological: Negative. Endo/Heme/Allergies: Negative. Psychiatric/Behavioral: Positive for depression. The patient is nervous/anxious and has insomnia. Review of Systems was obtained by the medical assistant and reviewed by myself. PHYSICAL EXAM:    Vitals:    10/07/20 1103   BP: 131/87   Pulse: 72       Constitutional: Appears well-developed and well-nourished. Eyes - conjunctiva normal.  Pupils react to light  Ear, nose,throat -Normal pinna and tragus, No scars, or lesions over external nose or ears, no obvious atrophy of tongue  Neck- symmetric, trachea midline, no jugular vein distension  Respiration- chest wall symmetric, normal effort without use of accessory muscles  Musculoskeletal - no significant wasting of muscles noted, no bony deformities, symmetric bulk  Extremities- no clubbing, cyanosis or edema  Skin - warm, dry, and intact. No rash,erythema, or pallor.   Psychiatric - mood, affect, and behavior appear normal.       NEUROLOGIC EXAM:    MENTAL STATUS: Alert, oriented, thought content appropriate    CRANIAL NERVES: PERRL, EOMI, symmetric facies, tongue midline    MOTOR:     Right Upper Extremity:    Deltoid: 5/5  Biceps: 5/5  Triceps: 5/5  Wrist Extension: 5/5  Finger Abduction: 5/5    Left Upper Extremity:    Deltoid: 5/5  Biceps: 5/5  Triceps: 5/5  Wrist Extension: 5/5  Finger Abduction: 5/5    Right Lower Extremity:    Hip Flexion: 5/5  Knee Extension: 5/5  Ankle Plantarflexion: 5/5  Ankle Dorsiflexion: 5/5  EHL: 5/5      Left Lower Extremity:    Hip Flexion: 5/5  Knee Extension: 5/5  Ankle Plantarflexion: 5/5  Ankle Dorsiflexion: 5/5  EHL: 5/5      SOMATOSENSORY:     Right Upper Extremity: normal light touch sensation  Left Upper Extremity: normal light touch sensation  Right Lower Extremity: normal light touch sensation  Left Lower Extremity: normal light touch sensation      REFLEXES:    Biceps: 1+  Patella: 1+  Ankle Jerk: 1+    Rivas's: Negative      COORDINATION and GAIT:    Gait: Antalgic due to R hip        IMAGING:    My interpretation of imaging studies:     X-rays of the cervical spine show loss/reversal of lordosis and degenerative disc disease in the lower cervical spine (C5/6 and C6/7)    X-rays of the thoracic spine demonstrate diffuse degenerative disc disease and mild scoliosis    X-rays of the lumbar spine show preserved lordosis with degenerative disc disease that is most prominent at L4/5 and to a lesser-degree at L5/S1          ASSESSMENT AND PLAN:  This is a 51 y.o. female who presents with chronic neck and back pain. She does not describe a clear radiculopathy and she has no evidence of weakness, sensory loss or myelopathy on physical exam.  Her pain appears to be axial in nature and I explained that this type of pain does not usually improve with surgical intervention. Her x-rays do not show any dramatic findings that suggest surgery or advanced imaging such as MRI are required at this time. She would like to avoid surgery if possible, and I recommended that she continue her current medications and begin a course of physical therapy. I will plan to see her again in 3 months to assess her response.             Chico Alcantara MD

## 2020-11-03 ENCOUNTER — OFFICE VISIT (OUTPATIENT)
Dept: FAMILY MEDICINE CLINIC | Age: 50
End: 2020-11-03
Payer: MEDICAID

## 2020-11-03 VITALS
BODY MASS INDEX: 34.99 KG/M2 | SYSTOLIC BLOOD PRESSURE: 126 MMHG | HEART RATE: 97 BPM | HEIGHT: 65 IN | WEIGHT: 210 LBS | RESPIRATION RATE: 18 BRPM | DIASTOLIC BLOOD PRESSURE: 72 MMHG | TEMPERATURE: 97.6 F | OXYGEN SATURATION: 98 %

## 2020-11-03 LAB — HBA1C MFR BLD: 5.4 %

## 2020-11-03 PROCEDURE — 83036 HEMOGLOBIN GLYCOSYLATED A1C: CPT | Performed by: NURSE PRACTITIONER

## 2020-11-03 PROCEDURE — 99214 OFFICE O/P EST MOD 30 MIN: CPT | Performed by: NURSE PRACTITIONER

## 2020-11-03 RX ORDER — CYCLOBENZAPRINE HCL 10 MG
10 TABLET ORAL 2 TIMES DAILY PRN
Qty: 60 TABLET | Refills: 2 | Status: SHIPPED | OUTPATIENT
Start: 2020-11-03 | End: 2020-12-03

## 2020-11-03 RX ORDER — FLUCONAZOLE 150 MG/1
150 TABLET ORAL DAILY
Qty: 3 TABLET | Refills: 0 | Status: SHIPPED | OUTPATIENT
Start: 2020-11-03 | End: 2020-11-06

## 2020-11-03 RX ORDER — BETAMETHASONE DIPROPIONATE 0.5 MG/G
CREAM TOPICAL
Qty: 45 G | Refills: 1 | Status: SHIPPED | OUTPATIENT
Start: 2020-11-03 | End: 2021-01-12 | Stop reason: SDUPTHER

## 2020-11-03 ASSESSMENT — ENCOUNTER SYMPTOMS
DIARRHEA: 0
COUGH: 0
BACK PAIN: 1
SHORTNESS OF BREATH: 0
CHEST TIGHTNESS: 0
NAUSEA: 0
WHEEZING: 0
ABDOMINAL PAIN: 0
SORE THROAT: 0

## 2020-11-03 NOTE — PROGRESS NOTES
Marc Kirkpatrick is a 51 y.o. female who presents today for  Chief Complaint   Patient presents with    Follow-up      needs stronger steroid cream     Diabetes     DM screen needed     Other     refused Flu shot       HPI:  She continues to have lower back pain, chronic. Pain is localized across the lower back and radiates into buttocks, L>R. Recent lumbar XR showed advanced disc degeneration at L4-5. I referred her to neurosurgery for evaluation. Not felt to be a surgical issue. Was told PT could be helpful. She is taking ibuprofen 800 mg 3 times daily, tizanidine twice daily. No significant improvement. She failed treatment previously with meloxicam.    She has psoriasis or eczema, chronic issue affecting legs, arms, trunk. She has had a flareup with a rash, pruritic on arms and legs mainly. She has been using triamcinolone cream with no improvement. She is using Platfora products. She is also had vaginal odor for the past few months intermittently. No vaginal discharge that she is aware of. Review of Systems   Constitutional: Negative for chills and fever. HENT: Negative for congestion, ear pain and sore throat. Respiratory: Negative for cough, chest tightness, shortness of breath and wheezing. Cardiovascular: Negative for chest pain. Gastrointestinal: Negative for abdominal pain, diarrhea and nausea. Musculoskeletal: Positive for back pain. Negative for arthralgias and myalgias. Skin: Positive for rash (Psoriasis, see HPI).        Past Medical History:   Diagnosis Date    Abnormal Pap smear of cervix     GERD (gastroesophageal reflux disease)     Hip pain     Hyperlipidemia     Nephrolithiasis     Primary osteoarthritis of right hip 12/3/2019    Psoriasis        Current Outpatient Medications   Medication Sig Dispense Refill    diclofenac (VOLTAREN) 50 MG EC tablet Take 1 tablet by mouth 3 times daily as needed for Pain 90 tablet 3    cyclobenzaprine (FLEXERIL) 10 MG tablet Take 1 tablet by mouth 2 times daily as needed for Muscle spasms 60 tablet 2    betamethasone dipropionate (DIPROLENE) 0.05 % cream Apply topically 2 times daily as needed for eczema. Avoid use on face, 45 g 1    fluconazole (DIFLUCAN) 150 MG tablet Take 1 tablet by mouth daily for 3 days 3 tablet 0    pantoprazole (PROTONIX) 40 MG tablet Take 1 tablet by mouth every morning (before breakfast) 30 tablet 5     No current facility-administered medications for this visit. No Known Allergies    Past Surgical History:   Procedure Laterality Date    CHOLECYSTECTOMY      HYSTERECTOMY      HYSTERECTOMY, VAGINAL      No BSO    LITHOTRIPSY      TOTAL HIP ARTHROPLASTY Right 2020    HIP TOTAL ARTHROPLASTY ANTERIOR APPROACH performed by Faiza Pozo MD at [facility] OR       Social History     Tobacco Use    Smoking status: Former Smoker     Packs/day: 1.00     Years: 30.00     Pack years: 30.00     Types: Cigarettes     Last attempt to quit: 2019     Years since quittin.9    Smokeless tobacco: Never Used   Substance Use Topics    Alcohol use: Not Currently    Drug use: Never       Family History   Problem Relation Age of Onset    Breast Cancer Paternal Grandmother     Hypertension Mother     High Cholesterol Mother     Hypertension Father     Other Father         GERD, nephrolithiasis    High Cholesterol Father     Diabetes type 2  Father     Coronary Art Dis Father     High Blood Pressure Sister     Other Sister         GERD       /72   Pulse 97   Temp 97.6 °F (36.4 °C) (Temporal)   Resp 18   Ht 5' 5\" (1.651 m)   Wt 210 lb (95.3 kg)   SpO2 98%   BMI 34.95 kg/m²     Physical Exam  Vitals signs reviewed. Constitutional:       General: She is not in acute distress. Appearance: Normal appearance. She is well-developed. HENT:      Head: Normocephalic.    Eyes:      Conjunctiva/sclera: Conjunctivae normal.      Pupils: Pupils are equal, round, and reactive to light. Neck:      Musculoskeletal: Normal range of motion and neck supple. Thyroid: No thyromegaly. Vascular: No carotid bruit or JVD. Trachea: No tracheal deviation. Cardiovascular:      Rate and Rhythm: Normal rate and regular rhythm. Heart sounds: Normal heart sounds. No murmur. Pulmonary:      Effort: Pulmonary effort is normal. No respiratory distress. Breath sounds: Normal breath sounds. No wheezing or rhonchi. Musculoskeletal: Normal range of motion. Lymphadenopathy:      Cervical: No cervical adenopathy. Skin:     General: Skin is warm and dry. Findings: Rash present. Comments: Scattered faintly erythematous slightly raised lesions to left leg, no excoriation. Neurological:      Mental Status: She is alert. Psychiatric:         Mood and Affect: Mood normal.         Behavior: Behavior normal.         Thought Content: Thought content normal.         ASSESSMENT/PLAN:  1. Chronic bilateral low back pain without sciatica  -Refer to pain management for evaluation and recommendations.  -Discussed PT and that it may be beneficial to try. We will start with pain management to see if she would benefit from injections but may need PT as well. -Trial of diclofenac 50 mg 3 times daily as needed. She will discontinue the ibuprofen. -Trial of cyclobenzaprine 10 mg twice daily as needed. She will stop the tizanidine.  - [address], [facility] Dr[facility]isa APRN, Pain Medicine, [place]    2. Degenerative disc disease, lumbar  -As above    3. Vaginal odor  -Fluconazole 150 mg daily x3 days for possible yeast infection. If not improving, may need vaginal swab to rule out other infection. 4. Psoriasis  -Betamethasone 0.05% cream twice daily as needed. Advised to avoid use on face. -Advised to stop fragranced soaps, lotions. Try Dove or Aveeno products. 5. Familial hyperlipidemia  -Needs fasting lab.   She may get this at her convenience in the next couple weeks. - CBC Auto Differential; Future  - Comprehensive Metabolic Panel; Future  - Lipid Panel; Future    6. Vitamin D deficiency  -Check vitamin D level with lab  - CBC Auto Differential; Future  - Vitamin D 25 Hydroxy; Future    7. Screening for diabetes mellitus  -A1c was normal today, 5.4. Reviewed with patient.  - POCT glycosylated hemoglobin (Hb A1C)         Return in about 2 months (around 1/3/2021) for 30 minute visit, follow up. Avelina was seen today for follow-up, diabetes and other. Diagnoses and all orders for this visit:    Screening for diabetes mellitus  -     POCT glycosylated hemoglobin (Hb A1C)    Chronic bilateral low back pain without sciatica  -     [facility] Pulse, APRN, Pain Medicine, [place]    Degenerative disc disease, lumbar    Vaginal odor    Psoriasis    Familial hyperlipidemia  -     CBC Auto Differential; Future  -     Comprehensive Metabolic Panel; Future  -     Lipid Panel; Future    Vitamin D deficiency  -     CBC Auto Differential; Future  -     Vitamin D 25 Hydroxy; Future    Other orders  -     diclofenac (VOLTAREN) 50 MG EC tablet; Take 1 tablet by mouth 3 times daily as needed for Pain  -     cyclobenzaprine (FLEXERIL) 10 MG tablet; Take 1 tablet by mouth 2 times daily as needed for Muscle spasms  -     betamethasone dipropionate (DIPROLENE) 0.05 % cream; Apply topically 2 times daily as needed for eczema. Avoid use on face,  -     fluconazole (DIFLUCAN) 150 MG tablet;  Take 1 tablet by mouth daily for 3 days      Medications Discontinued During This Encounter   Medication Reason    aspirin EC 81 MG EC tablet LIST CLEANUP    pravastatin (PRAVACHOL) 10 MG tablet LIST CLEANUP    tiZANidine (ZANAFLEX) 4 MG tablet LIST CLEANUP    vitamin D (ERGOCALCIFEROL) 1.25 MG (27379 UT) CAPS capsule LIST CLEANUP    triamcinolone (KENALOG) 0.1 % cream Alternate therapy    ibuprofen (ADVIL;MOTRIN) 800 MG tablet Alternate therapy     There are no Patient Instructions on file for this visit. Patient voicesunderstanding and agrees to plans along with risks and benefits of plan. Counseling:  Avelina Torres's case, medications and options were discussed in detail. Patient was instructed to call the office if she questionsregarding her treatment. Should her conditions worsen, she should return to office to be reassessed by JAEL Flores. she Should to go the closest Emergency Department for any emergency. They verbalizedunderstanding the above instructions. Return in about 2 months (around 1/3/2021) for 30 minute visit, follow up.

## 2021-01-11 ENCOUNTER — OFFICE VISIT (OUTPATIENT)
Dept: NEUROSURGERY | Age: 51
End: 2021-01-11
Payer: MEDICAID

## 2021-01-11 VITALS
SYSTOLIC BLOOD PRESSURE: 136 MMHG | WEIGHT: 204 LBS | HEIGHT: 65 IN | BODY MASS INDEX: 33.99 KG/M2 | DIASTOLIC BLOOD PRESSURE: 90 MMHG | HEART RATE: 87 BPM

## 2021-01-11 DIAGNOSIS — M54.50 CHRONIC BILATERAL LOW BACK PAIN WITHOUT SCIATICA: ICD-10-CM

## 2021-01-11 DIAGNOSIS — M54.12 CERVICAL RADICULOPATHY: Primary | ICD-10-CM

## 2021-01-11 DIAGNOSIS — M51.36 DEGENERATIVE DISC DISEASE, LUMBAR: ICD-10-CM

## 2021-01-11 DIAGNOSIS — G89.29 CHRONIC BILATERAL LOW BACK PAIN WITHOUT SCIATICA: ICD-10-CM

## 2021-01-11 DIAGNOSIS — M40.12 OTHER SECONDARY KYPHOSIS, CERVICAL REGION: ICD-10-CM

## 2021-01-11 PROCEDURE — 99213 OFFICE O/P EST LOW 20 MIN: CPT | Performed by: NEUROLOGICAL SURGERY

## 2021-01-11 RX ORDER — CYCLOBENZAPRINE HCL 5 MG
5 TABLET ORAL 3 TIMES DAILY PRN
COMMUNITY
End: 2021-01-12 | Stop reason: SDUPTHER

## 2021-01-11 NOTE — PROGRESS NOTES
NEUROSURGERY FOLLOW UP      Chief Complaint:   Chief Complaint   Patient presents with    Follow-up     back pain. Interval Update:    Unplanned follow up to discuss persistent back and neck pain. She was seen previously in October and x-rays were reviewed and we determined that her pain was mostly axial in nature and unlikely to benefit from additional imaging or surgery. Today, she does endorse some radiation of pain, both in her neck and lower back. In her neck, she describes radiation of pain into both shoulders and upper arms and intermittent tingling and numbness in both arms. In her lower back, she describes radiation of pain into both buttocks and posterior thighs. She denies any other numbness or weakness or change in bowel or bladder function. She has been referred to pain management and physical therapy and those assessments are pending. HPI:     The patient is a 51 y.o. female who presents for neurosurgical evaluation of chronic neck and back pain. She states the has been a progressive problem over many years. She does have a history of osteoarthritis and has recently undergone a right total hip arthroplasty with Dr. Frankey Ping. She describes pain in her cervical, thoracic and lumbar spine.       She states the lumbar back pain is most bothersome to her. She describes midline and paraspinal back pain with occasional radiation of pain into her left buttock. She vaguely describe occasional pain radiating lower in her legs but this is infrequent and she cannot describe a specific pattern to the pain. She endorses occasional tingling in her legs but again cannot describe a specific pattern or distribution of the tingling. She denies focal weakness and feels that her walking has improved since her hip surgery.   She denies numbness or weakness in her legs with walking.    Regarding her thoracic pain, she describes mostly mid-thoracic paraspinal and interscapular back pain. She denies radiation of pain into her chest or upper abdomen. She denies sensory loss in her chest or upper abdomen.     With respect to her neck pain, she describes chronic pain in her neck and suboccipital headaches. She has some occasional cramping and burning pain in her biceps and triceps. She denies arm weakness or numbness or tingling in her hands. She denies any loss of fine motor control but does endorse difficulty with her hands due to arthritis. She denies any loss of balance or difficulty with bowel or bladder control.     She is currently treating her pain with ibuprofen and tizanidine. She has not done any physical therapy for her back or neck, she has not seen pain management or had injections. She has no previous history of back or neck surgery. ROS:    Constitutional: Negative. HENT: Negative. Eyes: Negative. Respiratory: Negative. Cardiovascular: Negative. Gastrointestinal: Negative. Genitourinary: Negative. Musculoskeletal: Positive for back pain and joint pain. Skin: Negative. Neurological: Negative. Endo/Heme/Allergies: Negative. Psychiatric/Behavioral: Negative. Review of systems was obtained by the medical assistant and reviewed by myself.     Objective:    BP (!) 136/90   Pulse 87   Ht 5' 5\" (1.651 m)   Wt 204 lb (92.5 kg)   BMI 33.95 kg/m²         Physical Exam:    General: alert, cooperative, no distress  Cardiorespiratory: unlabored breathing      Neurologic Exam:    Mental Status: Alert, oriented, thought content appropriate  Cranial Nerves: PERRL, EOMI, symmetric facies, tongue midline  Motor: Motor exam is symmetrical 5 out of 5 all extremities bilaterally  Somatosensory: normal light touch sensation  Reflexes: 1+ at biceps and patella bilaterally      Imaging: X-rays of the cervical spine show multilevel degenerative disc disease and reversal of lordosis. X-rays of the lumbar spine show advanced degenerative disc disease at L4/5. Assessment and Plan:  54 y/o F returns for follow up complaining of persistent back and neck pain, which now appears to more of a radicular quality than during her previous visit. Given the obvious abnormalities on her x-rays and her persistent/worsening symptoms, I feel MRI scans of her cervical and lumbar are now indicated, both to evaluate if she may benefit from surgery and to guide possible interventions/injectiosn with pain management. I will plan to see her again when the MRI scans are complete.         Electronically signed by Mat Domingo MD on 1/11/2021 at 11:46 AM

## 2021-01-12 ENCOUNTER — OFFICE VISIT (OUTPATIENT)
Dept: FAMILY MEDICINE CLINIC | Age: 51
End: 2021-01-12
Payer: MEDICAID

## 2021-01-12 ENCOUNTER — HOSPITAL ENCOUNTER (OUTPATIENT)
Dept: PAIN MANAGEMENT | Age: 51
Discharge: HOME | End: 2021-01-12
Payer: MEDICAID

## 2021-01-12 VITALS
TEMPERATURE: 97.6 F | BODY MASS INDEX: 34.28 KG/M2 | WEIGHT: 206 LBS | HEART RATE: 80 BPM | DIASTOLIC BLOOD PRESSURE: 86 MMHG | RESPIRATION RATE: 18 BRPM | SYSTOLIC BLOOD PRESSURE: 116 MMHG | OXYGEN SATURATION: 98 %

## 2021-01-12 VITALS
RESPIRATION RATE: 18 BRPM | DIASTOLIC BLOOD PRESSURE: 93 MMHG | HEIGHT: 65 IN | WEIGHT: 204 LBS | HEART RATE: 100 BPM | BODY MASS INDEX: 33.99 KG/M2 | SYSTOLIC BLOOD PRESSURE: 127 MMHG | OXYGEN SATURATION: 95 % | TEMPERATURE: 98.4 F

## 2021-01-12 DIAGNOSIS — M54.6 CHRONIC MIDLINE THORACIC BACK PAIN: ICD-10-CM

## 2021-01-12 DIAGNOSIS — M50.30 DEGENERATIVE DISC DISEASE, CERVICAL: ICD-10-CM

## 2021-01-12 DIAGNOSIS — M51.36 DEGENERATIVE DISC DISEASE, LUMBAR: ICD-10-CM

## 2021-01-12 DIAGNOSIS — G89.29 CHRONIC BILATERAL LOW BACK PAIN WITHOUT SCIATICA: Primary | ICD-10-CM

## 2021-01-12 DIAGNOSIS — M54.50 CHRONIC BILATERAL LOW BACK PAIN WITHOUT SCIATICA: Primary | ICD-10-CM

## 2021-01-12 DIAGNOSIS — G89.29 CHRONIC MIDLINE THORACIC BACK PAIN: ICD-10-CM

## 2021-01-12 DIAGNOSIS — L40.9 PSORIASIS: Primary | ICD-10-CM

## 2021-01-12 PROCEDURE — 99204 OFFICE O/P NEW MOD 45 MIN: CPT | Performed by: NURSE PRACTITIONER

## 2021-01-12 PROCEDURE — 99215 OFFICE O/P EST HI 40 MIN: CPT

## 2021-01-12 PROCEDURE — 99213 OFFICE O/P EST LOW 20 MIN: CPT | Performed by: NURSE PRACTITIONER

## 2021-01-12 PROCEDURE — G0463 HOSPITAL OUTPT CLINIC VISIT: HCPCS

## 2021-01-12 RX ORDER — HYDROCODONE BITARTRATE AND ACETAMINOPHEN 5; 325 MG/1; MG/1
1 TABLET ORAL EVERY 8 HOURS PRN
Qty: 90 TABLET | Refills: 0 | Status: SHIPPED | OUTPATIENT
Start: 2021-01-12 | End: 2021-02-11

## 2021-01-12 RX ORDER — CYCLOBENZAPRINE HCL 5 MG
5 TABLET ORAL 2 TIMES DAILY PRN
Qty: 60 TABLET | Refills: 3 | Status: SHIPPED
Start: 2021-01-12 | End: 2021-03-01 | Stop reason: ALTCHOICE

## 2021-01-12 RX ORDER — BETAMETHASONE DIPROPIONATE 0.5 MG/G
CREAM TOPICAL
Qty: 45 G | Refills: 1 | Status: SHIPPED | OUTPATIENT
Start: 2021-01-12 | End: 2022-01-04

## 2021-01-12 ASSESSMENT — ENCOUNTER SYMPTOMS
NAUSEA: 0
CHEST TIGHTNESS: 0
COUGH: 0
DIARRHEA: 0
BOWEL INCONTINENCE: 0
SHORTNESS OF BREATH: 0
WHEEZING: 0
ROS SKIN COMMENTS: PSORIASIS
BACK PAIN: 1
CONSTIPATION: 0
BACK PAIN: 1
ABDOMINAL PAIN: 0
SORE THROAT: 0

## 2021-01-12 ASSESSMENT — PAIN DESCRIPTION - DIRECTION: RADIATING_TOWARDS: INTO BLE

## 2021-01-12 ASSESSMENT — PAIN DESCRIPTION - LOCATION: LOCATION: BACK

## 2021-01-12 ASSESSMENT — PAIN DESCRIPTION - PAIN TYPE: TYPE: CHRONIC PAIN

## 2021-01-12 ASSESSMENT — PAIN SCALES - GENERAL: PAINLEVEL_OUTOF10: 8

## 2021-01-12 NOTE — PROGRESS NOTES
Clinic Documentation      Education Provided:  [x] Review of Maximino Bowman  [x] Agreement Review  [x] PEG Score Calculated [x] PHQ Score Calculated [x] ORT Score Calculated    [] Compliance Issues Discussed [] Cognitive Behavior Needs [x] Exercise [] Review of Test [] Financial Issues  [x] Tobacco/Alcohol Use Reviewed [x] Teaching [x] New Patient [] Picture Obtained    Physician Plan:  [] Outgoing Referral  [] Pharmacy Consult  [] Test Ordered [x] Prescription Ordered/Changed   [] Obtained Test Results / Consult Notes        Complete if patient is withholding blood thinner for procedure     Blood Thinner Patient is currently taking:      [] Plavix (Hold for 7 days)  [] Aspirin (Hold for 5 days)     [] Pletal (Hold for 2 days)  [] Pradaxa (Hold for 3 days)    [] Effient (Hold for 7 days)  [] Xarelto (Hold for 2 days)    [] Eliquis (Hold for 2 days)  [] Brilinta (Hold for 7 days)    [] Coumadin (Hold for 5 days) - (INR needs to be drawn the day prior to procedure- INR < 2.0)    [] Aggrenox (Hold for 7 days)        [] Patient will stop medication on their own.    [] Blood Thinner Form Faxed for approval to hold.    Provider form faxed to:     Assessment Completed by:  Electronically signed by Marito Hammer RN on 1/12/2021 at 8:36 AM

## 2021-01-12 NOTE — H&P
[facility]    History and Physical    Patient Name: Baldomero Ocampo    MR #: [id number]    Account [de-identified]    : 1970    Age: 53 y.o. Sex: female    Date: 2021    PCP: JAEL Bravo    Referring Provider: JAEL Zarate    Chief Complaint:   Chief Complaint   Patient presents with    Lower Back Pain       History of Present Illness: The patient is a 53 y.o. female who was referrred with primary complaints of chronic low back pain. Patient has had back pain for years. She does not recall any specific injury. Patient states that pain will shoot down her buttocks into her legs at different times. Patient states that if she stands for long periods of time that her back will start to hurt and she feels like she needs to roll forward. Patient has not completed any physical therapy. Patient states that she has a hard time getting comfortable in any position. Patient states that in the afternoon she likes to lie down to flatten her back she feels better at first but she cannot get comfortable. Patient states that she has 100 % low back pain. Patient had hip replacement in 2020. Patient cannot do a lot of lifting, patient does not mop or sweep any longer (family does it) patient can't lift the laundry basket. On 2020Xray of C Spine indicated degenerative changes with loss of cervical lordosis, Thoracic and Lumbar spine with chronic degenerative changes. MRI has been ordered by neurosurgery    Back Pain  This is a chronic problem. The current episode started more than 1 year ago. The problem occurs constantly. The problem has been gradually worsening since onset. The pain is present in the lumbar spine and sacro-iliac. The quality of the pain is described as shooting, aching and cramping. The symptoms are aggravated by bending, sitting, standing and position.  Associated symptoms include leg pain, numbness, tingling and weakness. Pertinent negatives include no bladder incontinence or bowel incontinence. Screening Tools:     PEG: 10    ORT: 1    PHQ-9: 12    Current Pain Assessment  Pain Assessment  Pain Assessment: 0-10  Pain Level: 8  Pain Type: Chronic pain  Pain Location: Back  Pain Orientation: Lower  Pain Radiating Towards: into BLE    Past Medical History  Past Medical History:   Diagnosis Date    Abnormal Pap smear of cervix     GERD (gastroesophageal reflux disease)     Hip pain     Hyperlipidemia     Nephrolithiasis     Primary osteoarthritis of right hip 12/3/2019    Psoriasis        Allergies  Patient has no known allergies. Current Medications  Current Outpatient Medications   Medication Sig Dispense Refill    HYDROcodone-acetaminophen (NORCO) 5-325 MG per tablet Take 1 tablet by mouth every 8 hours as needed for Pain for up to 30 days. Take lowest dose possible to manage pain 90 tablet 0    cyclobenzaprine (FLEXERIL) 5 MG tablet Take 5 mg by mouth 3 times daily as needed for Muscle spasms      diclofenac (VOLTAREN) 50 MG EC tablet Take 1 tablet by mouth 3 times daily as needed for Pain 90 tablet 3    betamethasone dipropionate (DIPROLENE) 0.05 % cream Apply topically 2 times daily as needed for eczema. Avoid use on face, 45 g 1    pantoprazole (PROTONIX) 40 MG tablet Take 1 tablet by mouth every morning (before breakfast) 30 tablet 5     No current facility-administered medications for this encounter.         Social History    Social History     Socioeconomic History    Marital status:      Spouse name: None    Number of children: None    Years of education: None    Highest education level: None   Occupational History    None   Social Needs    Financial resource strain: None    Food insecurity     Worry: None     Inability: None    Transportation needs     Medical: None     Non-medical: None   Tobacco Use    Smoking status: Former Smoker     Packs/day: 1.00     Years: 30.00     Pack Exam  Vitals signs and nursing note reviewed. Constitutional:       General: She is not in acute distress. Appearance: She is well-developed. HENT:      Head: Normocephalic. Right Ear: External ear normal.      Left Ear: External ear normal.      Nose: Nose normal.   Eyes:      Conjunctiva/sclera: Conjunctivae normal.      Pupils: Pupils are equal, round, and reactive to light. Neck:      Vascular: No JVD. Trachea: No tracheal deviation. Cardiovascular:      Rate and Rhythm: Normal rate. Pulmonary:      Effort: Pulmonary effort is normal.   Abdominal:      General: There is no distension. Tenderness: There is no abdominal tenderness. Musculoskeletal:      Right hip: She exhibits tenderness. Right ankle: Tenderness. Lumbar back: She exhibits decreased range of motion, tenderness, pain and spasm. Comments: Patient points to on left SI joint as the source of low back pain  TTP of SI joint on left  positive Jose's on left, positive Distraction on left, positive Thigh Thrust on left    not piriformis muscle is taut - rope like - with palpable tender knots identified - Trigger points    Yes TTP for troch bursa bilaterally    No TTP for ischial bursa bilaterally    SLR + on left    Patient's facial expression + for pain during assessments. Lying down was very difficulty for patient     Skin:     General: Skin is warm and dry. Neurological:      Mental Status: She is alert and oriented to person, place, and time. Cranial Nerves: No cranial nerve deficit. Psychiatric:         Behavior: Behavior normal.         Thought Content:  Thought content normal.         Judgment: Judgment normal.         Labs:     Lab Results   Component Value Date     02/29/2020    K 3.6 02/29/2020     02/29/2020    CO2 29 02/29/2020    BUN 7 02/29/2020    CREATININE 0.5 02/29/2020    GLUCOSE 101 02/29/2020    CALCIUM 7.9 02/29/2020        Lab Results   Component Value Date    WBC 7.2 01/10/2020    HGB 7.6 (L) 02/29/2020    HCT 22.8 (L) 02/29/2020    MCV 95.4 01/10/2020     01/10/2020       Assessment:                                                                                                                                        Active Problems:    Chronic bilateral low back pain without sciatica    Primary osteoarthritis of right hip    Chronic neck pain    Degenerative disc disease, cervical    Degenerative disc disease, lumbar    Degenerative disc disease, thoracic    Chronic midline thoracic back pain  Resolved Problems:    * No resolved hospital problems. *      PLAN:    1. Chronic bilateral low back pain without sciatica  - HYDROcodone-acetaminophen (NORCO) 5-325 MG per tablet; Take 1 tablet by mouth every 8 hours as needed for Pain for up to 30 days. Take lowest dose possible to manage pain  Dispense: 90 tablet; Refill: 0 Paper script given due to electronic issues with providers token    Start low dose pain medication. Patient is very restless during appointment and has non verbal cues of pain. Patient to continue muscle relaxer and NSAID prescribed by PCP. Schedule Bilateral Lumbar TP's for myofacial pain. Patient may need Left SI vs lumbar facets awaiting for MRI results and response to trigger point injections. Patient given educational literature on on injections, medications and exercises. [x] Follow up    [x] 4 weeks   [] 6-8 weeks   [] 10-12 weeks   [] 3 months  [x] Post procedure to evaluate effectiveness of treatment  [] To evaluate medications changes made at office visit. [x] To review diagnostics ordered at last visit. [] To evaluate response to therapy    [x] For management of controlled substance  [x] Random UDS as indicated by ORT score or if indicated by abberent behaviors    Discussion: Discussed exam findings and plan of care with patient. Patient agreed with POC and questions were asked and answered.      Activity: discussed exercise as beneficial to pain reduction, encouraged stretching exercise with a focus on torso strengthening. Goal:  Pain Management Goals of Therapy:   [] Resolution in pain  [x] Decrease in pain level  [x] Improvement in ADL's  [x] Increase in activities with less pain  [] Decrease in medication     Controlled substance monitoring:    [x] Discussed medication side effects, risk of tolerance and/or dependence, and/or alternative treatment  [] Discussed the detrimental effects of long term narcotic use in younger patients especially women of childbearing years. [x] No signs and symptoms of potential drug abuse or diversion were identified  [] Signs of potential drug abuse or diversion were identified   [] ORT Score   [] UDS non-compliant   [] See Note  [] Random urine drug screen sent today  [x] Random urine drug screen not completed today   [x] Deferred New Patient  [] Compliant   [] Not Compliant see note  [x] Medication agreement with provider signed today  [] Medication agreement with provider on file under media   [] Medication regimen effective with no c/o side effects and continued   [x] New patient continuing current medication while developing POC   [] On going assessment and evaluation of medication regimen  [] Medication regimen not effective see plan for changes  [x] Arabella Delvalle reviewed & on file under media     CC:  JAEL Dobbs APRN - CNP, 1/12/2021 at 10:03 AM    EMR dragon/transcription disclaimer: Much of this encounter note is electronic transcription/translation of spoken language to printed tach. Electronic translation of spoken language may be erroneous, or at times, nonsensical words or phrases may be inadvertently transcribed.  Although, I have reviewed the note for such errors, some may still exist.

## 2021-02-03 ENCOUNTER — HOSPITAL ENCOUNTER (OUTPATIENT)
Dept: PAIN MANAGEMENT | Age: 51
Discharge: HOME | End: 2021-02-03
Payer: MEDICAID

## 2021-02-03 ENCOUNTER — TELEPHONE (OUTPATIENT)
Dept: NEUROSURGERY | Age: 51
End: 2021-02-03

## 2021-02-03 VITALS
DIASTOLIC BLOOD PRESSURE: 88 MMHG | OXYGEN SATURATION: 95 % | SYSTOLIC BLOOD PRESSURE: 138 MMHG | RESPIRATION RATE: 18 BRPM | HEART RATE: 107 BPM | TEMPERATURE: 97.3 F

## 2021-02-03 DIAGNOSIS — M79.18 BILATERAL MYOFASCIAL PAIN: ICD-10-CM

## 2021-02-03 PROCEDURE — 20553 NJX 1/MLT TRIGGER POINTS 3/>: CPT

## 2021-02-03 PROCEDURE — 2500000003 HC RX 250 WO HCPCS

## 2021-02-03 PROCEDURE — 20553 NJX 1/MLT TRIGGER POINTS 3/>: CPT | Performed by: NURSE PRACTITIONER

## 2021-02-03 RX ORDER — BUPIVACAINE HYDROCHLORIDE 5 MG/ML
10 INJECTION, SOLUTION EPIDURAL; INTRACAUDAL ONCE
Status: DISCONTINUED | OUTPATIENT
Start: 2021-02-03 | End: 2021-02-05 | Stop reason: HOSPADM

## 2021-02-03 RX ORDER — LIDOCAINE HYDROCHLORIDE 10 MG/ML
10 INJECTION, SOLUTION EPIDURAL; INFILTRATION; INTRACAUDAL; PERINEURAL ONCE
Status: DISCONTINUED | OUTPATIENT
Start: 2021-02-03 | End: 2021-02-05 | Stop reason: HOSPADM

## 2021-02-03 NOTE — PROGRESS NOTES
Procedure:  Level of Consciousness: [x]Alert [x]Oriented []Disoriented []Lethargic  Anxiety Level: [x]Calm []Anxious []Depressed []Other  Skin: [x]Warm [x]Dry []Cool []Moist []Intact []Other  Cardiovascular: [x]Palpitations: [x]Never []Occasionally []Frequently  Chest Pain: [x]No []Yes  Respiratory:  [x]Unlabored []Labored []Cough ([] Productive []Unproductive)  HCG Required: [x]No []Yes   Results: []Negative []Positive  Knowledge Level:        [x]Patient/Other verbalized understanding of pre-procedure instructions. [x]Assessment of post-op care needs (transportation, responsible caregiver)        [x]Able to discuss health care problems and how to deal with it.   Factors that Affect Teaching:        Language Barrier: [x]No []Yes - why:        Hearing Loss:        []No []Yes            Corrective Device:  []Yes []No        Vision Loss:           []No [x]Yes            Corrective Device:  [x]Yes []No        Memory Loss:       [x]No []Yes            []Short Term []Long Term  Motivational Level:  [x]Asks Questions                  []Extremely Anxious       [x]Seems Interested               []Seems Uninterested                  [x]Denies need for Education  Risk for Injury:  [x]Patient oriented to person, place and time  []History of frequent falls/loss of balance  Nutritional:  []Change in appetite   []Weight Gain   []Weight Loss  Functional:  []Requires assistance with ADL's

## 2021-02-03 NOTE — PROCEDURES
[facility]    Patient Name: Jian Stewart    : 1970                    Age: 53 y.o. Sex: female    Date: February 3, 2021    Pre-op Diagnosis: Myofascial Pain/ Muscle Spasms    Post-op Diagnosis: Myofascial Pain/ Muscle Spasms    Procedure: Lumbar Trigger Point Injections    Performing Procedure: Ross Mcdermott ACAGNP - [facility]; [facility]    Patient Vitals for the past 24 hrs:   BP Temp Temp src Pulse Resp SpO2   21 1331 138/88 97.3 °F (36.3 °C) Temporal 107 18 95 %       Description of Procedure:    After a brief physical assessment and failure to improve with conservative measures the patient presented for Lumbar Trigger Point Injections The indications, limitations and possible complications were discussed with the patient and the patient elected to proceed with the procedure. After voluntary, informed and signed consent obtained the patient was placed in a seated position. Appropriate time out was obtained per policy. The area of maximal tenderness was palpated over the bilaterally Lumbar Muscles - Lower Latissimus,  Erector Spinae, Lumbar Paraspinous. The skin overlying these areas was marked with a skin marker. The areas were prepped using aseptic technique with CHG prep. The skin overlying the proposed injection sites were then sprayed with Gebauer's Solution while protecting patient eyes. Each trigger point of the Lumbar Muscles - Lower Latissimus,  Erector Spinae, Lumbar Paraspinous was injected after negative aspiration was injected with approximately 1-2 ml of a solution of 5 ml of 1% Lidocaine Plain and 5 ml of 0.5% Marcaine Plain after negative aspiration    Discharge: The patient tolerated the procedure well. There were no complications during the procedure and the patient was discharged home with discharge instructions.     The patient has been instructed to contact the office should there be any complications or questions to arise between today and their next appointment. Plan:     Will return to the office in 6 weeks for follow up    JAEL Bright - CNP, 2/3/2021 at 1:47 PM

## 2021-02-08 ENCOUNTER — TELEPHONE (OUTPATIENT)
Dept: PAIN MANAGEMENT | Age: 51
End: 2021-02-08

## 2021-02-08 NOTE — TELEPHONE ENCOUNTER
I CALLED THE PATIENT AS A FOLLOW UP FROM HER BILATERAL LUMBAR TRIGGER POINT INJECTION THAT SHE HAD ON 2/3/2021. SHE DID NOT ANSWER.

## 2021-02-18 NOTE — TELEPHONE ENCOUNTER
Patient is calling about an update on the peer to peer for her MRI. She states that she has scheduled her MRI for the 22nd. Please advise, thank you!

## 2021-02-19 NOTE — TELEPHONE ENCOUNTER
Called to set up the peer to peer. Since the window for the peer to peer to be completed is past the calendar days there will need to be an appeal made through the patient's medicare member number located on the insurance card. We will need to specify that the tests were denied through [facility] already.

## 2021-02-23 NOTE — TELEPHONE ENCOUNTER
I was on the phone with  Insurance. I spoke  With risa. He states that he tried to connect me with aim but theres no answer. Only a vm and its full and will not accept any more calls. I am to send this appeal by mail to address provided. Also, to send a letter stating why this  Imaging is important. Please advise if you can write the letter or tell me to what to include; I have everything else printed to mail all of this. Thank you.

## 2021-02-24 NOTE — TELEPHONE ENCOUNTER
I called patient and no answer. I left her a vm informing her that because she has not done PT that's why insurance has probably denied it.

## 2021-03-01 ENCOUNTER — HOSPITAL ENCOUNTER (OUTPATIENT)
Dept: PAIN MANAGEMENT | Age: 51
Discharge: HOME | End: 2021-03-01
Payer: MEDICAID

## 2021-03-01 VITALS
RESPIRATION RATE: 16 BRPM | BODY MASS INDEX: 33.32 KG/M2 | OXYGEN SATURATION: 94 % | WEIGHT: 200 LBS | HEIGHT: 65 IN | SYSTOLIC BLOOD PRESSURE: 118 MMHG | HEART RATE: 110 BPM | TEMPERATURE: 99.3 F | DIASTOLIC BLOOD PRESSURE: 84 MMHG

## 2021-03-01 DIAGNOSIS — M54.50 CHRONIC BILATERAL LOW BACK PAIN WITHOUT SCIATICA: Primary | ICD-10-CM

## 2021-03-01 DIAGNOSIS — G89.29 CHRONIC BILATERAL LOW BACK PAIN WITHOUT SCIATICA: Primary | ICD-10-CM

## 2021-03-01 DIAGNOSIS — M79.18 BILATERAL MYOFASCIAL PAIN: ICD-10-CM

## 2021-03-01 PROCEDURE — 99214 OFFICE O/P EST MOD 30 MIN: CPT | Performed by: NURSE PRACTITIONER

## 2021-03-01 PROCEDURE — 99213 OFFICE O/P EST LOW 20 MIN: CPT

## 2021-03-01 RX ORDER — NABUMETONE 500 MG/1
500 TABLET, FILM COATED ORAL 2 TIMES DAILY
Qty: 60 TABLET | Refills: 0 | Status: SHIPPED | OUTPATIENT
Start: 2021-03-01 | End: 2021-04-21 | Stop reason: SDUPTHER

## 2021-03-01 RX ORDER — HYDROCODONE BITARTRATE AND ACETAMINOPHEN 7.5; 325 MG/1; MG/1
1 TABLET ORAL EVERY 8 HOURS PRN
Qty: 90 TABLET | Refills: 0 | Status: SHIPPED | OUTPATIENT
Start: 2021-03-01 | End: 2021-04-19 | Stop reason: SDUPTHER

## 2021-03-01 RX ORDER — TIZANIDINE 4 MG/1
TABLET ORAL
Qty: 60 TABLET | Refills: 2 | Status: SHIPPED | OUTPATIENT
Start: 2021-03-01 | End: 2021-06-17 | Stop reason: SDUPTHER

## 2021-03-01 ASSESSMENT — ENCOUNTER SYMPTOMS
BOWEL INCONTINENCE: 0
CONSTIPATION: 0
BACK PAIN: 1

## 2021-03-01 ASSESSMENT — PAIN DESCRIPTION - LOCATION: LOCATION: BACK

## 2021-03-01 ASSESSMENT — PAIN DESCRIPTION - ORIENTATION: ORIENTATION: LOWER

## 2021-03-01 NOTE — PROGRESS NOTES
500 MG tablet Take 1 tablet by mouth 2 times daily 60 tablet 0    tiZANidine (ZANAFLEX) 4 MG tablet 1/4 tablet with meals 1/2 to whole tablet at bedtime 60 tablet 2    diclofenac (VOLTAREN) 50 MG EC tablet Take 1 tablet by mouth 3 times daily as needed for Pain 90 tablet 5    betamethasone dipropionate (DIPROLENE) 0.05 % cream Apply topically 2 times daily as needed for eczema. Avoid use on face, 45 g 1    pantoprazole (PROTONIX) 40 MG tablet Take 1 tablet by mouth every morning (before breakfast) 30 tablet 5     No current facility-administered medications for this encounter.         Social History    Social History     Socioeconomic History    Marital status:      Spouse name: None    Number of children: None    Years of education: None    Highest education level: None   Occupational History    None   Social Needs    Financial resource strain: None    Food insecurity     Worry: None     Inability: None    Transportation needs     Medical: None     Non-medical: None   Tobacco Use    Smoking status: Former Smoker     Packs/day: 1.00     Years: 30.00     Pack years: 30.00     Types: Cigarettes     Quit date: 2019     Years since quittin.3    Smokeless tobacco: Never Used   Substance and Sexual Activity    Alcohol use: Not Currently    Drug use: Never    Sexual activity: None   Lifestyle    Physical activity     Days per week: None     Minutes per session: None    Stress: None   Relationships    Social connections     Talks on phone: None     Gets together: None     Attends [employer] service: None     Active member of club or organization: None     Attends meetings of clubs or organizations: None     Relationship status: None    Intimate partner violence     Fear of current or ex partner: None     Emotionally abused: None     Physically abused: None     Forced sexual activity: None   Other Topics Concern    None   Social History Narrative    None         Family History  family history includes Breast Cancer in her paternal grandmother; Coronary Art Dis in her father; Diabetes type 2  in her father; High Blood Pressure in her sister; High Cholesterol in her father and mother; Hypertension in her father and mother; Other in her father and sister. Review of Systems:  Review of Systems   Constitutional: Positive for activity change. Gastrointestinal: Negative for bowel incontinence and constipation. Genitourinary: Negative for bladder incontinence. Musculoskeletal: Positive for arthralgias, back pain and myalgias. Negative for neck pain. Neurological: Positive for tingling, weakness and numbness. Psychiatric/Behavioral: Positive for sleep disturbance. Negative for agitation, self-injury and suicidal ideas. The patient is not nervous/anxious. 14 point ROS negative besides that noted in HPI    Physical exam:     Vitals:    03/01/21 1057   BP: 118/84   Pulse: 110   Resp: 16   Temp: 99.3 °F (37.4 °C)   TempSrc: Temporal   SpO2: 94%   Weight: 200 lb (90.7 kg)   Height: 5' 5\" (1.651 m)       Body mass index is 33.28 kg/m². Physical Exam  Vitals signs and nursing note reviewed. Constitutional:       General: She is not in acute distress. Appearance: She is well-developed. HENT:      Head: Normocephalic. Right Ear: External ear normal.      Left Ear: External ear normal.      Nose: Nose normal.   Eyes:      Conjunctiva/sclera: Conjunctivae normal.      Pupils: Pupils are equal, round, and reactive to light. Neck:      Vascular: No JVD. Trachea: No tracheal deviation. Cardiovascular:      Rate and Rhythm: Normal rate. Pulmonary:      Effort: Pulmonary effort is normal.   Abdominal:      General: There is no distension. Tenderness: There is no abdominal tenderness. Musculoskeletal:      Right hip: She exhibits tenderness. Right ankle: Tenderness. Lumbar back: She exhibits decreased range of motion, tenderness, pain and spasm. Comments: Patient points to on left SI joint as the source of low back pain  TTP of SI joint on left  positive Jose's on left, positive Distraction on left, positive Thigh Thrust on left    not piriformis muscle is taut - rope like - with palpable tender knots identified - Trigger points    Yes TTP for troch bursa bilaterally    No TTP for ischial bursa bilaterally    SLR + on left    Patient's facial expression + for pain during assessments. Lying down was very difficulty for patient     Skin:     General: Skin is warm and dry. Neurological:      Mental Status: She is alert and oriented to person, place, and time. Cranial Nerves: No cranial nerve deficit. Psychiatric:         Behavior: Behavior normal.         Thought Content: Thought content normal.         Judgment: Judgment normal.         Labs:     Lab Results   Component Value Date     02/29/2020    K 3.6 02/29/2020     02/29/2020    CO2 29 02/29/2020    BUN 7 02/29/2020    CREATININE 0.5 02/29/2020    GLUCOSE 101 02/29/2020    CALCIUM 7.9 02/29/2020        Lab Results   Component Value Date    WBC 7.2 01/10/2020    HGB 7.6 (L) 02/29/2020    HCT 22.8 (L) 02/29/2020    MCV 95.4 01/10/2020     01/10/2020       Assessment:                                                                                                                                        Active Problems:    Chronic right hip pain    Chronic bilateral low back pain without sciatica    Primary osteoarthritis of right hip    Chronic neck pain    Degenerative disc disease, cervical    Degenerative disc disease, lumbar    Degenerative disc disease, thoracic    Chronic midline thoracic back pain    Bilateral myofascial pain  Resolved Problems:    * No resolved hospital problems. *      PLAN:    1. Chronic bilateral low back pain without sciatica  - HYDROcodone-acetaminophen (NORCO) 5-325 MG per tablet;  Take 1 tablet by mouth every 8 hours as needed for Pain for up to 30 days. Take lowest dose possible to manage pain  Dispense: 90 tablet; Refill: 0 Paper script given due to electronic issues with providers token    Start low dose pain medication. Patient is very restless during appointment and has non verbal cues of pain. Discontinue diclofenac and Flexeril as prescribed by PCP as patient states these medications are ineffective. Start Relafen 500 mg 2 times a day and tizanidine gradual taper. Patient given verbal instructions on how to take medications. Patient informed that without MRI be difficult to determine exactly what type of injections patient will need. Patient to start PT on Monday. Will change anti-inflammatory, muscle relaxer and start low-dose pain medication. We will follow-up with patient in approximately 10 weeks to determine effectiveness of medication changes. [x] Follow up    [] 4 weeks   [] 6-8 weeks   [x] 10-12 weeks   [] 3 months  [] Post procedure to evaluate effectiveness of treatment  [x] To evaluate medications changes made at office visit. [] To review diagnostics ordered at last visit. [] To evaluate response to therapy    [x] For management of controlled substance  [x] Random UDS as indicated by ORT score or if indicated by abberent behaviors    Discussion: Discussed exam findings and plan of care with patient. Patient agreed with POC and questions were asked and answered. Activity: discussed exercise as beneficial to pain reduction, encouraged stretching exercise with a focus on torso strengthening.     Goal:  Pain Management Goals of Therapy:   [] Resolution in pain  [x] Decrease in pain level  [x] Improvement in ADL's  [x] Increase in activities with less pain  [] Decrease in medication     Controlled substance monitoring:    [x] Discussed medication side effects, risk of tolerance and/or dependence, and/or alternative treatment  [] Discussed the detrimental effects of long term narcotic use in younger patients especially women of childbearing years. [x] No signs and symptoms of potential drug abuse or diversion were identified  [] Signs of potential drug abuse or diversion were identified   [] ORT Score   [] UDS non-compliant   [] See Note  [x] Random urine drug screen sent today  [x] Random urine drug screen not completed today   [x] Deferred New Patient  [] Compliant   [] Not Compliant see note  [] Medication agreement with provider signed today 3/1/2021  [x] Medication agreement with provider on file under media   [] Medication regimen effective with no c/o side effects and continued   [] New patient continuing current medication while developing POC   [x] On going assessment and evaluation of medication regimen  [] Medication regimen not effective see plan for changes  [x] Jose Valdes reviewed & on file under media     CC:  JAEL Fernandez APRN - CNP, 4/1/2021 at 5:58 PM    EMR dragon/transcription disclaimer: Much of this encounter note is electronic transcription/translation of spoken language to printed tach. Electronic translation of spoken language may be erroneous, or at times, nonsensical words or phrases may be inadvertently transcribed.  Although, I have reviewed the note for such errors, some may still exist.

## 2021-03-01 NOTE — PROGRESS NOTES
Clinic Documentation      Education Provided:  [x] Review of Butler Gram  [] Agreement Review  [x] PEG Score Calculated [] PHQ Score Calculated [] ORT Score Calculated    [] Compliance Issues Discussed [] Cognitive Behavior Needs [x] Exercise [] Review of Test [] Financial Issues  [x] Tobacco/Alcohol Use Reviewed [x] Teaching [] New Patient [] Picture Obtained    Physician Plan:  [] Outgoing Referral  [] Pharmacy Consult  [] Test Ordered [x] Prescription Ordered/Changed   [] Obtained Test Results / Consult Notes        Complete if patient is withholding blood thinner for procedure     Blood Thinner Patient is currently taking:      [] Plavix (Hold for 7 days)  [] Aspirin (Hold for 5 days)     [] Pletal (Hold for 2 days)  [] Pradaxa (Hold for 3 days)    [] Effient (Hold for 7 days)  [] Xarelto (Hold for 2 days)    [] Eliquis (Hold for 2 days)  [] Brilinta (Hold for 7 days)    [] Coumadin (Hold for 5 days) - (INR needs to be drawn the day prior to procedure- INR < 2.0)    [] Aggrenox (Hold for 7 days)        [] Patient will stop medication on their own.    [] Blood Thinner Form Faxed for approval to hold.    Provider form faxed to:     Assessment Completed by:  Electronically signed by Serina Chávez RN on 3/1/2021 at 10:41 AM

## 2021-03-02 ENCOUNTER — TELEPHONE (OUTPATIENT)
Dept: PAIN MANAGEMENT | Age: 51
End: 2021-03-02

## 2021-03-08 ENCOUNTER — TELEPHONE (OUTPATIENT)
Dept: FAMILY MEDICINE CLINIC | Age: 51
End: 2021-03-08

## 2021-03-08 ENCOUNTER — HOSPITAL ENCOUNTER (OUTPATIENT)
Dept: PHYSICAL THERAPY | Age: 51
Setting detail: THERAPIES SERIES
Discharge: HOME | End: 2021-03-08
Payer: MEDICAID

## 2021-03-08 DIAGNOSIS — G89.29 CHRONIC RIGHT HIP PAIN: ICD-10-CM

## 2021-03-08 DIAGNOSIS — M54.6 CHRONIC MIDLINE THORACIC BACK PAIN: Primary | ICD-10-CM

## 2021-03-08 DIAGNOSIS — M50.30 DEGENERATIVE DISC DISEASE, CERVICAL: ICD-10-CM

## 2021-03-08 DIAGNOSIS — M25.551 CHRONIC RIGHT HIP PAIN: ICD-10-CM

## 2021-03-08 DIAGNOSIS — M51.36 DEGENERATIVE DISC DISEASE, LUMBAR: ICD-10-CM

## 2021-03-08 DIAGNOSIS — G89.29 CHRONIC MIDLINE THORACIC BACK PAIN: Primary | ICD-10-CM

## 2021-03-08 PROCEDURE — 97162 PT EVAL MOD COMPLEX 30 MIN: CPT

## 2021-03-08 PROCEDURE — 97110 THERAPEUTIC EXERCISES: CPT

## 2021-03-08 ASSESSMENT — PAIN DESCRIPTION - FREQUENCY: FREQUENCY: CONTINUOUS

## 2021-03-08 ASSESSMENT — PAIN SCALES - GENERAL: PAINLEVEL_OUTOF10: 10

## 2021-03-08 ASSESSMENT — PAIN DESCRIPTION - ORIENTATION: ORIENTATION: MID;LOWER

## 2021-03-08 ASSESSMENT — PAIN DESCRIPTION - PAIN TYPE: TYPE: CHRONIC PAIN

## 2021-03-08 ASSESSMENT — PAIN DESCRIPTION - DESCRIPTORS: DESCRIPTORS: CONSTANT;ACHING

## 2021-03-08 NOTE — TELEPHONE ENCOUNTER
Aleks López called from [facility] [phone] regarding getting a new order for Scarlet. Her appointment is scheduled today at 1pm.   Please return order to fax # [phone] at your earliest convience.   Thank you

## 2021-03-08 NOTE — PROGRESS NOTES
Physical Therapy  Initial Assessment  Date: 3/8/2021  Patient Name: Jesusita Lizama  MRN: [id number]  : 1970     Treatment Diagnosis: exercises    Restrictions       Subjective   General  Chart Reviewed: Yes  Additional Pertinent Hx: Right total hip replacement ;  X-rays of the cervical spine show multilevel degenerative disc disease and reversal of lordosis. X-rays of the lumbar spine show advanced degenerative disc disease at L4/5. Family / Caregiver Present: No  Referring Practitioner: Jerry Wilkins  Referral Date : 21  Diagnosis: DDD, lumbar, cervical, right hip pain, chronic midline thoracic back pain  Follows Commands: Within Functional Limits  General Comment  Comments: Best :7/10  Worse 10/10: Patient states she has had Xrays  PT Visit Information  PT Insurance Information: [facility] medicaid  Total # of Visits Approved: 14  Total # of Visits to Date: 1  Plan of Care/Certification Expiration Date: 21  Progress Note Due Date: 21  Subjective  Subjective: Patient reports she has had alot of pain in her back for  years and it has gradually gotten worse. If she stands or sit for any length of time she is having increased pain. Melly Her is used to ease her pain but  she is taking 4-6 pills of 800 mg daily. She is no longer as active as she use to be. She reports radicular symptoms bilaterally intermittently  Pain Screening  Patient Currently in Pain: Yes  Pain Assessment  Pain Assessment: 0-10  Pain Level: 10  Pain Type: Chronic pain  Pain Location: Back  Pain Orientation: Mid;Lower  Pain Descriptors: Constant; Aching  Pain Frequency: Continuous  Vital Signs  Patient Currently in Pain: Yes    Vision/Hearing  Vision  Vision: Within Functional Limits    Orientation  Orientation  Overall Orientation Status: Within Normal Limits    Social/Functional History       Objective     Observation/Palpation  Posture: Fair  Palpation: TTP along lumbar paraspinals and with PA accessory movements of the spine  Observation: Patient puts more weight on her right leg when standing and sitting    Spine  Lumbar: [id number] extension, :5  RSB15    LSB 10     RR 50%   LR 50%    Strength RLE  Strength RLE: Exception  R Hip Flexion: 2+/5  R Hip ABduction: 3-/5  R Knee Flexion: 3+/5  Strength LLE  L Hip Flexion: 3+/5  L Knee Flexion: 4-/5  L Knee Extension: 4-/5  Motor Control  Gross Motor?: WFL  Additional Measures  Special Tests: Slump Test: negative  Other: Oswestry Disability Score: 66%  Sensation  Overall Sensation Status: WFL             Ambulation  Ambulation?: Yes  Ambulation 1  Surface: level tile  Device: No Device  Assistance: Independent  Quality of Gait: Patient shifts most of her weight on her right leg compared to her left. Gait Deviations: Decreased step length; Slow Mitali;Decreased arm swing;Decreased head and trunk rotation  Balance  Posture: Fair  Exercises  Exercise 1: PATIENT MAY NOT BE ABLE TO TOLERATE SUPINE EXERCISES AT THIS TIME.  START WITH SITTING , STANDING AND SIDELYING EXERCISE, PROGRESS AS SHE CAN TOLERATE  Exercise 2: trunk rotation-------DKTC----SKTC  Exercise 3: Hamstring stretch with belt  Exercise 4: quadratus stretch  Exercise 5: Pelvic Tilt  Exercise 6: TA contraction sequence  Exercise 7: Bridges  Exercise 8: Dead Bug  Exercise 9: supine 90/90 toe taps  Exercise 10: Open books  Exercise 11: Cat/camel  Exercise 12: Quadruped sequence  Exercise 13: Clam shells  Exercise 14: standing abduction  and extension  Exercise 15: Paloff Press  Exercise 16: Seated  Reaching left/right /center 5x 5sec  Exercise 17: overcorrection 5x ----- standing repeated extension 30sec  Exercise 18: seated pelvic tilts ------ seated trunk rotations  Exercise 20: STM in sitting   Estim in sitting                      Assessment   Conditions Requiring Skilled Therapeutic Intervention  Body structures, Functions, Activity limitations: Increased pain;Decreased posture;Decreased ROM  Assessment: Divine Keen is a 53year old female who presents to the clinic with the diagnosis of chronic back pain. During evaluation PT noted the following limitations: pain with movement , slouched posture in sitting, decreased LE strength, elevated paraspinals, radicular symptoms, limited  lumbar ROM and pain with accessory movmenets of her lumbar spine.  Patient would benefit from skilled PT at this time to address all functional limitations  Treatment Diagnosis: exercises  Prognosis: Good  Decision Making: Low Complexity  REQUIRES PT FOLLOW UP: Yes  Treatment Initiated : seated exercises  Discharge Recommendations: 2-3 sessions per week  Activity Tolerance  Activity Tolerance: Patient limited by pain         Plan   Plan  Times per week: 2  Plan weeks: 6-8  Current Treatment Recommendations: Strengthening, Home Exercise Program, Neuromuscular Re-education, Safety Education & Training, Manual Therapy - Soft Tissue Mobilization, Patient/Caregiver Education & Training, Functional Mobility Training, Manual Therapy - Joint Manipulation, Pain Management, Positioning, ADL/Self-care Training, Modalities    G-Code       OutComes Score                                                  AM-PAC Score             Goals  Short term goals  Time Frame for Short term goals: 2-4 weeks  Short term goal 1: Patient will be indep with HEP  Short term goal 2: Patient will increase lumbar ROM by 15 degrees in each direction  Short term goal 3: Patient will be able to increase LE strength to 4/5  Long term goals  Time Frame for Long term goals : 4-8 weeks  Long term goal 1: Patient Oswestry Disability score will be less than 30%  Long term goal 2: Patient will increase core strength by being able to complete 10 TA contractions  Long term goal 3: Patient will be able to sit or stand for greater than 30 minutes to complete household activities  Long term goal 4: Patient will report decreased pain with accesory movements of the spine  Patient Goals Patient goals : \"I want to get back to normal.\"       Therapy Time   Individual Concurrent Group Co-treatment   Time In 1300         Time Out 1335         Minutes 35         Timed Code Treatment Minutes: [address] [facility] Romelia Godoy PT    Electronically signed by Elva Olivas PT on 3/8/2021 at 2:23 PM

## 2021-03-12 ENCOUNTER — APPOINTMENT (OUTPATIENT)
Dept: PHYSICAL THERAPY | Age: 51
End: 2021-03-12
Payer: MEDICAID

## 2021-03-15 ENCOUNTER — HOSPITAL ENCOUNTER (OUTPATIENT)
Dept: PHYSICAL THERAPY | Age: 51
Setting detail: THERAPIES SERIES
Discharge: HOME | End: 2021-03-15
Payer: MEDICAID

## 2021-03-15 PROCEDURE — G0283 ELEC STIM OTHER THAN WOUND: HCPCS

## 2021-03-15 PROCEDURE — 97110 THERAPEUTIC EXERCISES: CPT

## 2021-03-15 ASSESSMENT — PAIN SCALES - GENERAL: PAINLEVEL_OUTOF10: 8

## 2021-03-15 NOTE — PROGRESS NOTES
Daily Treatment Note  Date: 3/15/2021  Patient Name: Jian Stewart  MRN: [id number]     :   1970    Subjective:   General  Chart Reviewed: Yes  Additional Pertinent Hx: Right total hip replacement ;  X-rays of the cervical spine show multilevel degenerative disc disease and reversal of lordosis. X-rays of the lumbar spine show advanced degenerative disc disease at L4/5. Family / Caregiver Present: No  Referring Practitioner: Karly Rothman  PT Visit Information  PT Insurance Information: [facility] [place]  Total # of Visits Approved: 14  Total # of Visits to Date: 2  Plan of Care/Certification Expiration Date: 21  Progress Note Due Date: 21  Subjective  Subjective: I am doing okay. My back just really hurts  General Comment  Comments: Best :7/10  Worse 10/10: Patient states she has had Xrays  Pain Screening  Patient Currently in Pain: Yes  Pain Assessment  Pain Assessment: 0-10  Pain Level: 8(8/10 post session)  Pain Type: Chronic pain  Pain Location: Back; Hip  Pain Orientation: Lower;Mid;Right  Pain Descriptors: Constant; Throbbing  Pain Frequency: Continuous       Treatment Activities:               Exercises  Exercise 1: PATIENT MAY NOT BE ABLE TO TOLERATE SUPINE EXERCISES AT THIS TIME.  START WITH SITTING , STANDING AND SIDELYING EXERCISE, PROGRESS AS SHE CAN TOLERATE  Exercise 2: trunk rotation-1 x 5 bilateral------DKTC--not today--SKTC---not today  Exercise 3: Hamstring stretch with belt  Exercise 4: quadratus stretch 5 x 5 sec bilateral  Exercise 5: Pelvic Tilt-- 1 x 5--increases low back discomfort  Exercise 6: TA contraction sequence alone 5 x 5 sec, all others x 5  Exercise 7: Bridges 1 x 5  Exercise 8: Dead Bug--not today  Exercise 9: supine 90/90 toe taps--not today  Exercise 10: Open books 1 x 5  Exercise 11: Cat/camel 1 x 5  Exercise 12: Quadruped sequence--not today  Exercise 13: Clam shells--not today  Exercise 14: standing abduction  and extension  Exercise 15: Paloff Press  Exercise 16: Seated  Reaching left/right /center 5x 5sec- each  Exercise 17: overcorrection 10 x ----- standing repeated extension 30sec  Exercise 18: seated pelvic tilts  1 x 5------ seated trunk rotations 1 x 5 bilateral  Exercise 20: STM in sitting--not today   Estim in sitting--IFC x 20 mins to low back         Assessment:   Conditions Requiring Skilled Therapeutic Intervention  Body structures, Functions, Activity limitations: Increased pain;Decreased posture;Decreased ROM  Assessment: Initiated ex and estim per POC. Patient very guarded with all ex's. She requires extra time and can only perform limited reps with limited ROM. Many exercises skipped over due to patients inability to continue with supine position. She even sat in the chair with very guarded posture. Patient rates pain at 8/10 pre and post session.   Treatment Diagnosis: exercises  Prognosis: Good  Decision Making: Low Complexity  REQUIRES PT FOLLOW UP: Yes  Treatment Initiated : seated exercises  Discharge Recommendations: 2-3 sessions per week               Goals:  Short term goals  Time Frame for Short term goals: 2-4 weeks  Short term goal 1: Patient will be indep with HEP  Short term goal 2: Patient will increase lumbar ROM by 15 degrees in each direction  Short term goal 3: Patient will be able to increase LE strength to 4/5  Long term goals  Time Frame for Long term goals : 4-8 weeks  Long term goal 1: Patient Oswestry Disability score will be less than 30%  Long term goal 2: Patient will increase core strength by being able to complete 10 TA contractions  Long term goal 3: Patient will be able to sit or stand for greater than 30 minutes to complete household activities  Long term goal 4: Patient will report decreased pain with accesory movements of the spine  Patient Goals   Patient goals : \"I want to get back to normal.\"    Plan:    Plan  Times per week: 2  Plan weeks: 6-8  Current Treatment Recommendations: Strengthening, Home Exercise Program, Neuromuscular Re-education, Safety Education & Training, Manual Therapy - Soft Tissue Mobilization, Patient/Caregiver Education & Training, Functional Mobility Training, Manual Therapy - Joint Manipulation, Pain Management, Positioning, ADL/Self-care Training, Modalities  Timed Code Treatment Minutes: 40 Minutes(plus 20 mins estim)     Therapy Time   Individual Concurrent Group Co-treatment   Time In 1300         Time Out 1400         Minutes 60         Timed Code Treatment Minutes: 40 Minutes(plus 20 mins estim)       May Rendon, ZEHRA

## 2021-03-18 ENCOUNTER — HOSPITAL ENCOUNTER (OUTPATIENT)
Dept: PHYSICAL THERAPY | Age: 51
Setting detail: THERAPIES SERIES
Discharge: HOME | End: 2021-03-18
Payer: MEDICAID

## 2021-03-18 PROCEDURE — G0283 ELEC STIM OTHER THAN WOUND: HCPCS

## 2021-03-18 PROCEDURE — 97110 THERAPEUTIC EXERCISES: CPT

## 2021-03-18 ASSESSMENT — PAIN DESCRIPTION - LOCATION: LOCATION: BACK;HIP

## 2021-03-18 ASSESSMENT — PAIN DESCRIPTION - PAIN TYPE: TYPE: CHRONIC PAIN

## 2021-03-18 NOTE — PROGRESS NOTES
Physical Therapy  Daily Treatment Note  Date: 3/18/2021  Patient Name: London Rust  MRN: [id number]     :   1970    Subjective:   General  Chart Reviewed: Yes  Additional Pertinent Hx: Right total hip replacement ;  X-rays of the cervical spine show multilevel degenerative disc disease and reversal of lordosis. X-rays of the lumbar spine show advanced degenerative disc disease at L4/5. Family / Caregiver Present: No  Referring Practitioner: Robby Ocampo  PT Visit Information  PT Insurance Information: [facility] medicaid  Total # of Visits Approved: 14  Total # of Visits to Date: 3  Plan of Care/Certification Expiration Date: 21  Progress Note Due Date: 21  Subjective  Subjective: States she hurt after last visit but no more than usual.  Does indicate her pain is elevated today. Pain Screening  Patient Currently in Pain: Yes  Pain Assessment  Pain Assessment: 0-10  Pain Level: 9(9/10 to start session)  Pain Type: Chronic pain  Pain Location: Back; Hip  Pain Orientation: Mid;Right; Lower  Pain Descriptors: Aching;Constant; Throbbing  Pain Frequency: Continuous(intensity varies but nonstop)  Vital Signs  Patient Currently in Pain: Yes       Treatment Activities:               Exercises  Exercise 1: PATIENT MAY NOT BE ABLE TO TOLERATE SUPINE EXERCISES AT THIS TIME.  START WITH SITTING , STANDING AND SIDELYING EXERCISE, PROGRESS AS SHE CAN TOLERATE  Exercise 2: trunk rotation-1 x 5 bilateral--NOT today----DKTC--not today--SKTC---not today  Exercise 3: Hamstring stretch with belt x 3 for 10\" ea (performed sitting today)  Exercise 4: quadratus stretch 5 x 5 sec bilateral---NOT today  Exercise 5: Pelvic Tilt-- 1 x 5--increases low back discomfort---NOT today  Exercise 6: TA contraction sequence alone 10 x 5 sec, all others x 5 (performed seated)  Exercise 7: Bridges 1 x 5---NOT today  Exercise 8: Dead Bug--not today  Exercise 9: supine 90/90 toe taps--not today  Exercise 10: Open books 1 x 5 (seated today)  Exercise 11: Cat/camel 1 x 5  Exercise 12: Quadruped sequence--x 5 UE reach alternating (UE only)  Exercise 13: Clam shells--not today  Exercise 14: standing abduction  and extension x 5 ea with TA contraction  Exercise 15: Paloff Press---NOt today  Exercise 16: Seated  Reaching left/right /center 5x 5sec- each  Exercise 17: overcorrection 10 x --NOT today--- standing repeated extension 30sec  Exercise 18: seated pelvic tilts  1 x 5------ seated trunk rotations 1 x 5 bilateral  Exercise 20: STM in sitting--not today   Estim in sitting--IFC x 15 mins to low back (HP for 1st 10 min and then removed as she was warm and uncomfortable; IFC stopped at 15' as pt unable to get comfortable and writhing in chair)                Assessment:   Conditions Requiring Skilled Therapeutic Intervention  Assessment: Pt in more pain today so kept her in the chair (as this chosen position) for most of visit and routine modified accordingly. Did do some quadruped activities; however, all ex kept at low repetitions and intensity. Pt not getting much relief from IFC and chose to discontinue early as she was restless in chair. No change in pain post session.     Goals:     Plan:    Plan  Times per week: 2  Plan weeks: 6-8  Current Treatment Recommendations: Strengthening, Home Exercise Program, Neuromuscular Re-education, Safety Education & Training, Manual Therapy - Soft Tissue Mobilization, Patient/Caregiver Education & Training, Functional Mobility Training, Manual Therapy - Joint Manipulation, Pain Management, Positioning, ADL/Self-care Training, Modalities  Timed Code Treatment Minutes: 40 Minutes(15' of IFC/heat)     Therapy Time   Individual Concurrent Group Co-treatment   Time In 1305         Time Out 1400         Minutes 55         Timed Code Treatment Minutes: 40 Minutes(15' of IFC/heat)  Electronically signed by Saba Jaimes PTA on 3/18/2021 at 3:56 PM

## 2021-03-22 ENCOUNTER — APPOINTMENT (OUTPATIENT)
Dept: PHYSICAL THERAPY | Age: 51
End: 2021-03-22
Payer: MEDICAID

## 2021-03-24 ENCOUNTER — APPOINTMENT (OUTPATIENT)
Dept: PHYSICAL THERAPY | Age: 51
End: 2021-03-24
Payer: MEDICAID

## 2021-03-29 ENCOUNTER — HOSPITAL ENCOUNTER (OUTPATIENT)
Dept: PHYSICAL THERAPY | Age: 51
Setting detail: THERAPIES SERIES
Discharge: HOME | End: 2021-03-29
Payer: MEDICAID

## 2021-03-29 PROCEDURE — 97110 THERAPEUTIC EXERCISES: CPT

## 2021-03-29 PROCEDURE — G0283 ELEC STIM OTHER THAN WOUND: HCPCS

## 2021-03-29 ASSESSMENT — PAIN SCALES - GENERAL: PAINLEVEL_OUTOF10: 8

## 2021-03-29 ASSESSMENT — PAIN DESCRIPTION - ORIENTATION: ORIENTATION: LOWER

## 2021-03-29 ASSESSMENT — PAIN DESCRIPTION - DESCRIPTORS: DESCRIPTORS: ACHING;CONSTANT

## 2021-03-29 NOTE — PROGRESS NOTES
Physical Therapy  Daily Treatment Note  Date: 3/29/2021  Patient Name: Nathan Arriola  MRN: [id number]     :   1970    Subjective:   General  Chart Reviewed: Yes  Additional Pertinent Hx: Right total hip replacement ;  X-rays of the cervical spine show multilevel degenerative disc disease and reversal of lordosis. X-rays of the lumbar spine show advanced degenerative disc disease at L4/5. Family / Caregiver Present: No  Referring Practitioner: Yoni Smith  PT Visit Information  PT Insurance Information: [facility] [place]  Total # of Visits Approved: 14  Total # of Visits to Date: 4  Plan of Care/Certification Expiration Date: 21  Progress Note Due Date: 21  Subjective  Subjective: I just hurt all the time. I just can't get comfortable any time. Pain Screening  Patient Currently in Pain: Yes  Pain Assessment  Pain Assessment: 0-10  Pain Level: 8  Pain Type: Chronic pain  Pain Location: Back  Pain Orientation: Lower  Pain Descriptors: Aching;Constant  Vital Signs  Patient Currently in Pain: Yes       Treatment Activities:    Exercises  Exercise 1: PATIENT MAY NOT BE ABLE TO TOLERATE SUPINE EXERCISES AT THIS TIME.  START WITH SITTING , STANDING AND SIDELYING EXERCISE, PROGRESS AS SHE CAN TOLERATE  Exercise 2: trunk rotation-1 x 5 bilateral--NOT today----DKTC--not today--SKTC---not today  Exercise 3: Hamstring stretch  x 3 for 10\" ea (performed contract/ relax in supine today)  Exercise 4: quadratus stretch 5 x 5 sec bilateral---NOT today  Exercise 5: Pelvic Tilt-- 1 x 5--increases low back discomfort---NOT today  Exercise 6: TA contraction sequence alone 10 x 5 sec, all others x 10 (performed seated)  Exercise 7: Bridges 1 x 5---NOT today  Exercise 8: Dead Bug--not today  Exercise 9: supine 90/90 toe taps--not today  Exercise 10: Open books 1 x 5 (seated today)  Exercise 11: Cat/camel 1 x 5  Exercise 12: Quadruped sequence--x 5 UE reach alternating   LE alternating  Exercise 13: Clam shells--not today  Exercise 14: standing abduction  and extension x 5 ea with TA contraction  Exercise 15: Paloff Press---NOt today  Exercise 16: Seated  Reaching left/right /center 5x 5sec- each  Exercise 17: overcorrection x 3    --- standing repeated extension 30sec  Exercise 18: seated pelvic tilts  1 x 5------ seated trunk rotations 1 x 5 bilateral  Exercise 20: STM in sitting--not today   Estim in sitting--IFC x 20 mins to low back with cold pack and two pillows behind back        Assessment:   Conditions Requiring Skilled Therapeutic Intervention  Assessment: Patient has increased back pain. She attempted to lay on her back and was unable for any length of time due to increased pain. She was able to tolerate bilateral HS stretches in supine. She was also able to perform UE and LE quadruped today. IFC increased this visit due to patient stating she could not feel it after a few minutes. Ice was used instead of heat today at patient's request.  She stated that pain was slightly lower post session than at beginning.   Discharge Recommendations: 2-3 sessions per week    Goals:  Short term goals  Time Frame for Short term goals: 2-4 weeks  Short term goal 1: Patient will be indep with HEP  Short term goal 2: Patient will increase lumbar ROM by 15 degrees in each direction  Short term goal 3: Patient will be able to increase LE strength to 4/5  Long term goals  Time Frame for Long term goals : 4-8 weeks  Long term goal 1: Patient Oswestry Disability score will be less than 30%  Long term goal 2: Patient will increase core strength by being able to complete 10 TA contractions  Long term goal 3: Patient will be able to sit or stand for greater than 30 minutes to complete household activities  Long term goal 4: Patient will report decreased pain with accesory movements of the spine  Patient Goals   Patient goals : \"I want to get back to normal.\"  Plan:    Plan  Times per week: 2  Plan weeks: 6-8  Current Treatment Recommendations: Strengthening, Home Exercise Program, Neuromuscular Re-education, Safety Education & Training, Manual Therapy - Soft Tissue Mobilization, Patient/Caregiver Education & Training, Functional Mobility Training, Manual Therapy - Joint Manipulation, Pain Management, Positioning, ADL/Self-care Training, Modalities  Timed Code Treatment Minutes: 33 Minutes     Therapy Time   Individual Concurrent Group Co-treatment   Time In [phone]         Time Out 0105         Minutes 53         Timed Code Treatment Minutes: 33 Minutes  Electronically signed by Ayush Hayden PTA on 3/29/2021 at 3:16 PM

## 2021-03-31 ENCOUNTER — HOSPITAL ENCOUNTER (OUTPATIENT)
Dept: PHYSICAL THERAPY | Age: 51
Setting detail: THERAPIES SERIES
Discharge: HOME | End: 2021-03-31
Payer: MEDICAID

## 2021-03-31 PROCEDURE — 97110 THERAPEUTIC EXERCISES: CPT

## 2021-03-31 PROCEDURE — G0283 ELEC STIM OTHER THAN WOUND: HCPCS

## 2021-03-31 ASSESSMENT — PAIN DESCRIPTION - ORIENTATION: ORIENTATION: LOWER

## 2021-03-31 ASSESSMENT — PAIN DESCRIPTION - DESCRIPTORS: DESCRIPTORS: ACHING;CONSTANT

## 2021-03-31 ASSESSMENT — PAIN DESCRIPTION - LOCATION: LOCATION: BACK

## 2021-03-31 ASSESSMENT — PAIN SCALES - GENERAL: PAINLEVEL_OUTOF10: 9

## 2021-03-31 NOTE — PROGRESS NOTES
10: Open books 1 x 5 (seated today)  Exercise 11: Cat/camel 1 x 5  Exercise 12: Quadruped sequence--x 5 UE reach alternating and  LE alternating  Exercise 13: Clam shells--not today  Exercise 14: standing abduction  and extension x 5 ea with TA contraction---NOT today  Exercise 15: Paloff Press x 10 with red band  Exercise 16: Seated  Reaching left/right /center 5x 5sec- each  Exercise 17: overcorrection x 5    --- standing repeated extension 30sec  Exercise 18: seated pelvic tilts  1 x 5------ seated trunk rotations 1 x 5 bilateral  Exercise 20: STM in sitting--not today   Estim in sitting--IFC x 20 mins to low back with cold pack and two pillows behind back          Assessment:   Conditions Requiring Skilled Therapeutic Intervention  Assessment: Pt has been consistent with her attendance with PT. She cont to report constant high level pain that is aggravated with activity and certain positions. We have attempted to modify positions for standard ex to a position of greater comfort; however, she is very limited with her range and repetitions due to inc pain these modified ex's cause. She was set up on IFC with cold pack at end of session and this only brings her back to her starting level pain of 8 1/2 or 9/10 pain.   Discharge Recommendations: 2-3 sessions per week    Goals:  Short term goals  Time Frame for Short term goals: 2-4 weeks  Short term goal 1: Patient will be indep with HEP  Short term goal 2: Patient will increase lumbar ROM by 15 degrees in each direction  Short term goal 3: Patient will be able to increase LE strength to 4/5  Long term goals  Time Frame for Long term goals : 4-8 weeks  Long term goal 1: Patient Oswestry Disability score will be less than 30%  Long term goal 2: Patient will increase core strength by being able to complete 10 TA contractions  Long term goal 3: Patient will be able to sit or stand for greater than 30 minutes to complete household activities  Long term goal 4: Patient will report decreased pain with accesory movements of the spine  Patient Goals   Patient goals : \"I want to get back to normal.\"  Plan:    Plan  Times per week: 2  Plan weeks: 6-8  Current Treatment Recommendations: Strengthening, Home Exercise Program, Neuromuscular Re-education, Safety Education & Training, Manual Therapy - Soft Tissue Mobilization, Patient/Caregiver Education & Training, Functional Mobility Training, Manual Therapy - Joint Manipulation, Pain Management, Positioning, ADL/Self-care Training, Modalities  Timed Code Treatment Minutes: 33 Minutes(20' of IFC)     Therapy Time   Individual Concurrent Group Co-treatment   Time In 1302         Time Out 1355         Minutes 53         Timed Code Treatment Minutes: 33 Minutes(20' of IFC)  Electronically signed by Niko Way PTA on 3/31/2021 at 2:08 PM

## 2021-04-07 ENCOUNTER — HOSPITAL ENCOUNTER (OUTPATIENT)
Dept: PHYSICAL THERAPY | Age: 51
Setting detail: THERAPIES SERIES
Discharge: HOME | End: 2021-04-07
Payer: MEDICAID

## 2021-04-07 PROCEDURE — 97110 THERAPEUTIC EXERCISES: CPT

## 2021-04-07 ASSESSMENT — PAIN SCALES - GENERAL: PAINLEVEL_OUTOF10: 8

## 2021-04-07 ASSESSMENT — PAIN DESCRIPTION - LOCATION: LOCATION: BACK

## 2021-04-07 NOTE — PROGRESS NOTES
Physical Therapy  Daily Treatment Note/Reassessment  Date: 2021  Patient Name: Sylvie Nelson  MRN: [id number]     :   1970    Subjective:   General  Chart Reviewed: Yes  Additional Pertinent Hx: Right total hip replacement ;  X-rays of the cervical spine show multilevel degenerative disc disease and reversal of lordosis. X-rays of the lumbar spine show advanced degenerative disc disease at L4/5. Family / Caregiver Present: No  Referring Practitioner: Aurelia Zavala  PT Visit Information  PT Insurance Information: [facility] medicaid  Total # of Visits Approved: 14  Total # of Visits to Date: 6  Plan of Care/Certification Expiration Date: 21  Progress Note Due Date: 21  Subjective  Subjective: Patient states that so far not getting any relief, in constant pain no matter what position she gets into, especially lying flat on her back. She can sleep prone for about 20-30 minutes. Her mat program has been difficult to tolerate. Her pain is focused more on her left side, has difficulty standing in place for > 10-15 minutes. Pain Screening  Patient Currently in Pain: Yes  Pain Assessment  Pain Assessment: 0-10  Pain Level: 8  Pain Type: Chronic pain  Pain Location: Back  Vital Signs  Patient Currently in Pain: Yes       Treatment Activities:                                 Strength RLE  Strength RLE: Exception  R Hip Flexion: 2+/5;3-/5  R Hip ABduction: 3-/5;3/5  R Knee Flexion: 3+/5;4-/5  Strength LLE  L Hip Flexion: 4/5  L Knee Flexion: 4/5  L Knee Extension: 4+/5  Exercises  Exercise 1: PATIENT MAY NOT BE ABLE TO TOLERATE SUPINE EXERCISES AT THIS TIME.  START WITH SITTING , STANDING AND SIDELYING EXERCISE, PROGRESS AS SHE CAN TOLERATE  Exercise 2: trunk rotation-1 x 5 bilateral--NOT today----DKTC--not today--SKTC---not today-------NOT able to tolerate lying down on 3/31/21  Exercise 3: Hamstring stretch  x 5 for 5\" ea (performed seated w/o contract relax and heel propped on 4\" step) pt with greater pain reported with this on L  Exercise 4: quadratus stretch 5 x 5 sec bilateral---NOT today  Exercise 5: Pelvic Tilt-- 1 x 5--increases low back discomfort---NOT today  Exercise 6: TA contraction sequence alone 10 x 5 sec, all others x 10 (performed seated)  Exercise 7: Bridges 1 x 5---NOT today  Exercise 8: Dead Bug--not today  Exercise 9: supine 90/90 toe taps--not today  Exercise 10: Open books 1 x 5 (seated today)  Exercise 11: Cat/camel 1 x 5  Exercise 12: Quadruped sequence--x 5 UE reach alternating and  LE alternating  Exercise 13: Clam shells--not today  Exercise 14: standing abduction  and extension x 5 ea with TA contraction---NOT today  Exercise 15: Paloff Press x 10 with red band  Exercise 16: Seated  Reaching left/right /center 5x 5sec- each  Exercise 17: overcorrection x 5    --- standing repeated extension 30sec  Exercise 18: seated pelvic tilts  1 x 5------ seated trunk rotations 1 x 5 bilateral  Exercise 19: pelvic traction--10 mins, 85 #  Exercise 20: STM in sitting--not today   Estim in sitting--IFC x 20 mins to low back with cold pack and two pillows behind back                               Assessment:   Conditions Requiring Skilled Therapeutic Intervention  Assessment: Pt continues to be consistent with her attendance with PT and continues to report constant high level pain that is aggravated with activity and certain positions, with no particular position being comfortable for long. She was reassessed today and showed some mild gains in leg strength and slight percentage reduction of pain on the Oswestry. Lumbar traction was tried at today's session for ~12 minutes, with her reporting no change in pain at end, with her appearing guarded with traction pull and having difficulty getting off of traction plinthe. Ms. Janine Quiñones relates she does feel therapy helps or helps for long, though she is willing to try anything that will help with her pain.  So far, e-stim, traction, exercise Neuromuscular Re-education, Safety Education & Training, Manual Therapy - Soft Tissue Mobilization, Patient/Caregiver Education & Training, Functional Mobility Training, Manual Therapy - Joint Manipulation, Pain Management, Positioning, ADL/Self-care Training, Modalities  Timed Code Treatment Minutes: 55 Minutes(12' of pelvic traction)     Therapy Time   Individual Concurrent Group Co-treatment   Time In 1258         Time Out 1357         Minutes 59         Timed Code Treatment Minutes: 55 Minutes(12' of pelvic traction)  Electronically signed by Benito Jasmine PT on 4/7/2021 at 5:27 PM

## 2021-04-09 ENCOUNTER — APPOINTMENT (OUTPATIENT)
Dept: PHYSICAL THERAPY | Age: 51
End: 2021-04-09
Payer: MEDICAID

## 2021-04-14 ENCOUNTER — TELEPHONE (OUTPATIENT)
Dept: NEUROSURGERY | Age: 51
End: 2021-04-14

## 2021-04-14 NOTE — TELEPHONE ENCOUNTER
Patient called and voiced that she needs to set up an MRI. She states that the MRI was originally denied due to her not having physical therapy first. Patient voiced that she has been seeing physical therapy and there is nothing more that they can do for her. She is wanting to move forward with scheduling the MRI now. I voiced to the patient that our office would need to call her insurance company and make sure that we can get it approved first before we schedule it. I voiced to her that someone would get this taken care of and give her a call back to schedule if they approve it. Patient voiced understanding.

## 2021-04-19 DIAGNOSIS — G89.29 CHRONIC BILATERAL LOW BACK PAIN WITHOUT SCIATICA: ICD-10-CM

## 2021-04-19 DIAGNOSIS — M54.50 CHRONIC BILATERAL LOW BACK PAIN WITHOUT SCIATICA: ICD-10-CM

## 2021-04-21 RX ORDER — NABUMETONE 500 MG/1
500 TABLET, FILM COATED ORAL 2 TIMES DAILY
Qty: 60 TABLET | Refills: 0 | Status: SHIPPED
Start: 2021-04-21 | End: 2021-05-17 | Stop reason: DRUGHIGH

## 2021-04-21 RX ORDER — HYDROCODONE BITARTRATE AND ACETAMINOPHEN 7.5; 325 MG/1; MG/1
1 TABLET ORAL EVERY 8 HOURS PRN
Qty: 90 TABLET | Refills: 0 | Status: SHIPPED | OUTPATIENT
Start: 2021-04-21 | End: 2021-05-21

## 2021-04-21 NOTE — TELEPHONE ENCOUNTER
Called [facility] to try and get this patient's MRI's approved. Spoke with Joselin Matta. She voiced that this requires a peer to peer. She voiced that the provider would need to call [phone]. provider can call the number directly and speak with someone about getting this approved.

## 2021-04-22 ENCOUNTER — TELEPHONE (OUTPATIENT)
Dept: PAIN MANAGEMENT | Age: 51
End: 2021-04-22

## 2021-04-22 NOTE — TELEPHONE ENCOUNTER
Received call from Christine Ko at [facility] [employer]. She is requesting number for medical records, to request patient information. Have given her number [phone] and instructed her she may follow the prompts.

## 2021-04-23 NOTE — TELEPHONE ENCOUNTER
I called patient as well and phone number sounds busy. I have sent a my chart message to patient with information on how to schedule her mri and to call our office to schedule an appt when she has completed this task.

## 2021-04-23 NOTE — TELEPHONE ENCOUNTER
Braulio Santos MD  You; Gloria Sebastian MA 23 hours ago (8:48 AM)     Peer to peer completed.  Authorization number is [id number] valid 4/21 - 6/19      Called patient to let her know that her MRI has been approved and we can go ahead and schedule. Phone number has a busy signal for the last two days and I cannot get in touch with her. Will try again this afternoon.

## 2021-05-17 ENCOUNTER — HOSPITAL ENCOUNTER (OUTPATIENT)
Dept: PAIN MANAGEMENT | Age: 51
Discharge: HOME | End: 2021-05-17
Payer: MEDICAID

## 2021-05-17 ENCOUNTER — HOSPITAL ENCOUNTER (OUTPATIENT)
Dept: MRI IMAGING | Age: 51
Discharge: HOME | End: 2021-05-17
Payer: MEDICAID

## 2021-05-17 VITALS
SYSTOLIC BLOOD PRESSURE: 128 MMHG | WEIGHT: 200 LBS | HEIGHT: 65 IN | TEMPERATURE: 98.9 F | HEART RATE: 78 BPM | RESPIRATION RATE: 16 BRPM | OXYGEN SATURATION: 95 % | DIASTOLIC BLOOD PRESSURE: 88 MMHG | BODY MASS INDEX: 33.32 KG/M2

## 2021-05-17 DIAGNOSIS — M54.50 CHRONIC BILATERAL LOW BACK PAIN WITHOUT SCIATICA: ICD-10-CM

## 2021-05-17 DIAGNOSIS — M54.12 CERVICAL RADICULOPATHY: ICD-10-CM

## 2021-05-17 DIAGNOSIS — G89.29 CHRONIC BILATERAL LOW BACK PAIN WITHOUT SCIATICA: Primary | ICD-10-CM

## 2021-05-17 DIAGNOSIS — M54.50 CHRONIC BILATERAL LOW BACK PAIN WITHOUT SCIATICA: Primary | ICD-10-CM

## 2021-05-17 DIAGNOSIS — M40.12 OTHER SECONDARY KYPHOSIS, CERVICAL REGION: ICD-10-CM

## 2021-05-17 DIAGNOSIS — M51.36 DEGENERATIVE DISC DISEASE, LUMBAR: ICD-10-CM

## 2021-05-17 DIAGNOSIS — Z02.89 PAIN MEDICATION AGREEMENT: ICD-10-CM

## 2021-05-17 DIAGNOSIS — G89.29 CHRONIC BILATERAL LOW BACK PAIN WITHOUT SCIATICA: ICD-10-CM

## 2021-05-17 DIAGNOSIS — G89.29 CHRONIC NECK PAIN: ICD-10-CM

## 2021-05-17 DIAGNOSIS — M54.2 CHRONIC NECK PAIN: ICD-10-CM

## 2021-05-17 PROCEDURE — 99213 OFFICE O/P EST LOW 20 MIN: CPT

## 2021-05-17 PROCEDURE — 99214 OFFICE O/P EST MOD 30 MIN: CPT | Performed by: NURSE PRACTITIONER

## 2021-05-17 PROCEDURE — 72141 MRI NECK SPINE W/O DYE: CPT

## 2021-05-17 PROCEDURE — 72148 MRI LUMBAR SPINE W/O DYE: CPT

## 2021-05-17 RX ORDER — HYDROCODONE BITARTRATE AND ACETAMINOPHEN 7.5; 325 MG/1; MG/1
1 TABLET ORAL EVERY 8 HOURS PRN
Qty: 90 TABLET | Refills: 0 | Status: SHIPPED | OUTPATIENT
Start: 2021-05-21 | End: 2021-06-28 | Stop reason: SDUPTHER

## 2021-05-17 RX ORDER — NABUMETONE 750 MG/1
750 TABLET, FILM COATED ORAL 2 TIMES DAILY
Qty: 60 TABLET | Refills: 2 | Status: SHIPPED | OUTPATIENT
Start: 2021-05-17 | End: 2021-09-08 | Stop reason: SDUPTHER

## 2021-05-17 ASSESSMENT — PAIN DESCRIPTION - ORIENTATION: ORIENTATION: LOWER

## 2021-05-17 ASSESSMENT — PAIN DESCRIPTION - LOCATION: LOCATION: BACK

## 2021-05-17 ASSESSMENT — PAIN DESCRIPTION - PAIN TYPE: TYPE: CHRONIC PAIN

## 2021-05-17 NOTE — PROGRESS NOTES
[facility]    Office Visit    Patient Name: Jennifer Burch    MR #: [id number]    Account [de-identified]    : 1970    Age: 53 y.o. Sex: female    Date: 2021    PCP: JAEL Patrick    Chief Complaint:   Chief Complaint   Patient presents with    Lower Back Pain       History of Present Illness: The patient is a 53 y.o. female who presents for follow up of PT. Patient states that PT did not help. She states that Relafen did help some. Patient had MRI of Cervical Spine and Lumbar Spine today. . Patient is requesting to postpone work up on back and focus on neck at this time. Patient states that her neck pain is worsening. She states that she gets a flush sensation as if someone squeezes her arms really tight and then lets loose. Back Pain  This is a chronic problem. The current episode started more than 1 year ago. The problem occurs constantly. The problem has been waxing and wanning. The pain is present in the lumbar spine. The quality of the pain is described as shooting, cramping and aching. The symptoms are aggravated by sitting, standing, position and bending. Associated symptoms include leg pain, numbness, tingling and weakness. Pertinent negatives include no bladder incontinence or bowel incontinence. Medications Failed: Norco 5 mg, Flexeril 5 & 10 mg & Diclofenac 50 mg TID    Screening Tools:     PE    Past PEG: 10    ORT: 1    PHQ-9: 12    Current Pain Assessment  Pain Assessment  Pain Assessment: 0-10  Pain Level: 9  Pain Type: Chronic pain  Pain Location: Back  Pain Orientation: Lower    Past Visit HPI:  3/1/2021   presents with primary complaints of chronic low back pain and procedure follow up. At last appointment patient was scheduled for bilateral lumbar trigger points. Patient underwent trigger point injections on 2/3/2021. Patient stated she had no relief from pain with these injections.     At the time of the initial assessment patient was on Norco 5 by PCP, Flexeril 5 mg and tried 10 mg, and diclofenac at 50 mg 3 times daily. Patient states that none of these medications have been effective in treating her pain. Patient states that she is having worsening pain on the left side and left lower back. Patient is to start physical therapy on Monday and patient has declined MRI that was scheduled by neurosurgery. 1/2021  Patient has had back pain for years. She does not recall any specific injury. Patient states that pain will shoot down her buttocks into her legs at different times. Patient states that if she stands for long periods of time that her back will start to hurt and she feels like she needs to roll forward. Patient has not completed any physical therapy. Patient states that she has a hard time getting comfortable in any position. Patient states that in the afternoon she likes to lie down to flatten her back she feels better at first but she cannot get comfortable. Patient states that she has 100 % low back pain. Patient had hip replacement in Feb 2020. Patient cannot do a lot of lifting, patient does not mop or sweep any longer (family does it) patient can't lift the laundry basket. On 9/29/2020Xray of C Spine indicated degenerative changes with loss of cervical lordosis, Thoracic and Lumbar spine with chronic degenerative changes. MRI has been ordered by neurosurgery. Back Pain is a chronic problem. The current episode started more than 1 year ago. The problem occurs constantly. The problem has been gradually worsening since onset. The pain is present in the lumbar spine and sacro-iliac. The quality of the pain is described as shooting, aching and cramping. The symptoms are aggravated by bending, sitting, standing and position. Associated symptoms include leg pain, numbness, tingling and weakness. Pertinent negatives include no bladder incontinence or bowel incontinence.        Past Medical History  Past Medical History:   Diagnosis Date    Abnormal Pap smear of cervix     GERD (gastroesophageal reflux disease)     Hip pain     Hyperlipidemia     Nephrolithiasis     Primary osteoarthritis of right hip 12/3/2019    Psoriasis        Allergies  Patient has no known allergies. Current Medications  Current Outpatient Medications   Medication Sig Dispense Refill    HYDROcodone-acetaminophen (NORCO) 7.5-325 MG per tablet Take 1 tablet by mouth every 8 hours as needed for Pain for up to 30 days. 90 tablet 0    nabumetone (RELAFEN) 500 MG tablet Take 1 tablet by mouth 2 times daily 60 tablet 0    tiZANidine (ZANAFLEX) 4 MG tablet 1/4 tablet with meals 1/2 to whole tablet at bedtime 60 tablet 2    diclofenac (VOLTAREN) 50 MG EC tablet Take 1 tablet by mouth 3 times daily as needed for Pain 90 tablet 5    betamethasone dipropionate (DIPROLENE) 0.05 % cream Apply topically 2 times daily as needed for eczema. Avoid use on face, 45 g 1    pantoprazole (PROTONIX) 40 MG tablet Take 1 tablet by mouth every morning (before breakfast) 30 tablet 5     No current facility-administered medications for this encounter.        Social History    Social History     Socioeconomic History    Marital status:      Spouse name: None    Number of children: None    Years of education: None    Highest education level: None   Occupational History    None   Tobacco Use    Smoking status: Former Smoker     Packs/day: 1.00     Years: 30.00     Pack years: 30.00     Types: Cigarettes     Quit date: 2019     Years since quittin.4    Smokeless tobacco: Never Used   Vaping Use    Vaping Use: Never used   Substance and Sexual Activity    Alcohol use: Not Currently    Drug use: Never    Sexual activity: None   Other Topics Concern    None   Social History Narrative    None     Social Determinants of Health     Financial Resource Strain:     Difficulty of Paying Living Expenses:    Food Insecurity:  Worried About [facility] in the Last Year:    [address] in the Last Year:    Transportation Needs:     Lack of Transportation (Medical):  Lack of Transportation (Non-Medical):    Physical Activity:     Days of Exercise per Week:     Minutes of Exercise per Session:    Stress:     Feeling of Stress :    Social Connections:     Frequency of Communication with Friends and Family:     Frequency of Social Gatherings with Friends and Family:     Attends Muslim Services:     Active Member of Clubs or Organizations:     Attends Club or Organization Meetings:     Marital Status:    Intimate Partner Violence:     Fear of Current or Ex-Partner:     Emotionally Abused:     Physically Abused:     Sexually Abused:          Family History  family history includes Breast Cancer in her paternal grandmother; Coronary Art Dis in her father; Diabetes type 2  in her father; High Blood Pressure in her sister; High Cholesterol in her father and mother; Hypertension in her father and mother; Other in her father and sister. Review of Systems:  Review of Systems   Constitutional: Positive for activity change. Gastrointestinal: Negative for bowel incontinence and constipation. Genitourinary: Negative for bladder incontinence. Musculoskeletal: Positive for arthralgias, back pain and myalgias. Negative for neck pain. Neurological: Positive for tingling, weakness and numbness. Psychiatric/Behavioral: Positive for sleep disturbance. Negative for agitation, self-injury and suicidal ideas. The patient is not nervous/anxious. 14 point ROS negative besides that noted in HPI    Physical exam:     Vitals:    05/17/21 1041   BP: 128/88   Pulse: 78   Resp: 16   Temp: 98.9 °F (37.2 °C)   TempSrc: Temporal   SpO2: 95%   Weight: 200 lb (90.7 kg)   Height: 5' 5\" (1.651 m)       Body mass index is 33.28 kg/m². Physical Exam  Vitals signs and nursing note reviewed.    Constitutional: 7.2 01/10/2020    HGB 7.6 (L) 02/29/2020    HCT 22.8 (L) 02/29/2020    MCV 95.4 01/10/2020     01/10/2020       Assessment:                                                                                                                                        Active Problems:    Chronic right hip pain    Chronic bilateral low back pain without sciatica    Primary osteoarthritis of right hip    Chronic neck pain    Degenerative disc disease, cervical    Degenerative disc disease, lumbar    Degenerative disc disease, thoracic    Chronic midline thoracic back pain    Bilateral myofascial pain  Resolved Problems:    * No resolved hospital problems. *      PLAN:    1. Chronic bilateral low back pain without sciatica  - HYDROcodone-acetaminophen (NORCO) 7.5-325 MG per tablet; Take 1 tablet by mouth every 8 hours as needed for Pain for up to 30 days. Intended supply: 30 days  Dispense: 90 tablet; Refill: 0    2. Chronic neck pain  - nabumetone (RELAFEN) 750 MG tablet; Take 1 tablet by mouth 2 times daily  Dispense: 60 tablet; Refill: 2    Schedule DILAN of C5/C6 under Fluoroscopy with Dr. Malik Pratt. [x] Follow up    [] 4 weeks   [x] 6-8 weeks   [] 10-12 weeks   [] 3 months  [x] Post procedure to evaluate effectiveness of treatment  [] To evaluate medications changes made at office visit. [] To review diagnostics ordered at last visit. [] To evaluate response to therapy    [x] For management of controlled substance  [x] Random UDS as indicated by ORT score or if indicated by abberent behaviors    Discussion: Discussed exam findings and plan of care with patient. Patient agreed with POC and questions were asked and answered. Activity: discussed exercise as beneficial to pain reduction, encouraged stretching exercise with a focus on torso strengthening.     Goal:  Pain Management Goals of Therapy:   [] Resolution in pain  [x] Decrease in pain level  [x] Improvement in ADL's  [x] Increase in activities with less pain  [] Decrease in medication     Controlled substance monitoring:    [x] Discussed medication side effects, risk of tolerance and/or dependence, and/or alternative treatment  [] Discussed the detrimental effects of long term narcotic use in younger patients especially women of childbearing years. [x] No signs and symptoms of potential drug abuse or diversion were identified  [] Signs of potential drug abuse or diversion were identified   [] ORT Score   [] UDS non-compliant   [] See Note  [] Random urine drug screen sent today  [x] Random urine drug screen not completed today   [] Deferred New Patient  [x] Compliant  3/1/2021  [] Not Compliant see note  [] Medication agreement with provider signed today 3/1/2021  [x] Medication agreement with provider on file under media   [] Medication regimen effective with no c/o side effects and continued   [] New patient continuing current medication while developing POC   [x] On going assessment and evaluation of medication regimen  [] Medication regimen not effective see plan for changes  [x] Para Duster reviewed & on file under media     CC:  JAEL Lind    Governor JAEL Justin CNP, 5/17/2021 at 11:34 AM    EMR dragon/transcription disclaimer: Much of this encounter note is electronic transcription/translation of spoken language to printed tach. Electronic translation of spoken language may be erroneous, or at times, nonsensical words or phrases may be inadvertently transcribed.  Although, I have reviewed the note for such errors, some may still exist.

## 2021-05-17 NOTE — PROGRESS NOTES
Clinic Documentation      Education Provided:  [x] Review of Yoanna Mariscal  [] Agreement Review  [x] PEG Score Calculated [] PHQ Score Calculated [] ORT Score Calculated    [] Compliance Issues Discussed [] Cognitive Behavior Needs [x] Exercise [] Review of Test [] Financial Issues  [x] Tobacco/Alcohol Use Reviewed [x] Teaching [] New Patient [] Picture Obtained    Physician Plan:  [] Outgoing Referral  [] Pharmacy Consult  [] Test Ordered [x] Prescription Ordered/Changed   [] Obtained Test Results / Consult Notes        Complete if patient is withholding blood thinner for procedure     Blood Thinner Patient is currently taking:      [] Plavix (Hold for 7 days)  [] Aspirin (Hold for 5 days)     [] Pletal (Hold for 2 days)  [] Pradaxa (Hold for 3 days)    [] Effient (Hold for 7 days)  [] Xarelto (Hold for 2 days)    [] Eliquis (Hold for 2 days)  [] Brilinta (Hold for 7 days)    [] Coumadin (Hold for 5 days) - (INR needs to be drawn the day prior to procedure- INR < 2.0)    [] Aggrenox (Hold for 7 days)        [] Patient will stop medication on their own.    [] Blood Thinner Form Faxed for approval to hold.    Provider form faxed to:     Assessment Completed by:  Electronically signed by Asia Alvarez RN on 5/17/2021 at 10:37 AM

## 2021-05-19 NOTE — PROGRESS NOTES
[facility]  OUTPATIENT PHYSICAL THERAPY  DISCHARGE SUMMARY    Date: 2021  Patient Name: Jose Antonio Godoy        MRN: [id number]    ACCOUNT #: [de-identified]  : 1970  (53 y.o.)  Gender: female   Referring Practitioner: Princess Desai  Diagnosis: DDD, lumbar, cervical, right hip pain, chronic midline thoracic back pain  Referral Date : 21       Total # of Visits Approved: 14  Total # of Visits to Date: 6    Subjective( Taken last time she was in clinic)  Subjective: Patient states that so far not getting any relief, in constant pain no matter what position she gets into, especially lying flat on her back. She can sleep prone for about 20-30 minutes. Her mat program has been difficult to tolerate. Her pain is focused more on her left side, has difficulty standing in place for > 10-15 minutes. Additional Pertinent Hx: Right total hip replacement ;  X-rays of the cervical spine show multilevel degenerative disc disease and reversal of lordosis. X-rays of the lumbar spine show advanced degenerative disc disease at L4/5. Objective  Treatments received include: there ex, modalities  See objective/subjective data in goals    Assessment: (patient was last seen on her reassessment day on . She has not returned to the clinic since. She had several no shows and a cancellation. Therefore, patient will be discharged at this time. Assessment: Pt continues to be consistent with her attendance with PT and continues to report constant high level pain that is aggravated with activity and certain positions, with no particular position being comfortable for long. She was reassessed today and showed some mild gains in leg strength and slight percentage reduction of pain on the Oswestry. Lumbar traction was tried at today's session for ~12 minutes, with her reporting no change in pain at end, with her appearing guarded with traction pull and having difficulty getting off of traction plinthe. Ms. Bernice Jones relates she does feel therapy helps or helps for long, though she is willing to try anything that will help with her pain. So far, e-stim, traction, exercise and manual therapy have not changed her pain level. My understanding is that an MRI has been ordered, but cannot be performed until she attends PT. Today was her 6th visit and she will contact her doctor to see if she could receive an MRI earlier. Currently, she has one more visit scheduled. Plan  discharge         Goals (goals assessed last on 4/7/21)  Short term goals  Time Frame for Short term goals: 2-4 weeks  Short term goal 1: Patient will be indep with HEP--not yet met (4/7/21 Patient has not yet been issued a HEP.)  Short term goal 2: Patient will increase lumbar ROM by 15 degrees in each direction--not met (4/7/21 marginal to no change in initial eval readings)  Short term goal 3: Patient will be able to increase LE strength to 4/5--progress (4/7/21 Patient has shown some small gains in increased leg strength from her initial eval (see strength section for 4/7/21). )    Long term goals  Time Frame for Long term goals : 4-8 weeks  Long term goal 1: Patient Oswestry Disability score will be less than 30%--progress (4/7/21 Patient scored 58% impairment on the Oswestry Disability Questionnaire.)  Long term goal 2: Patient will increase core strength by being able to complete 10 TA contractions.  --progress (4/7/21 Patient is able to complete 10 TA contractions (has trouble tolerating supine positioning))  Long term goal 3: Patient will be able to sit or stand for greater than 30 minutes to complete household activities--not yet met (4/7/21 Patient sometimes can stand up to 30 minutes, can sit up to 30 minutes but not comfortable.)  Long term goal 4: Patient will report decreased pain with accesory movements of the spine--not met (4/7/21 Patient appears to continue to have pain with accesory spine movements.)         Electronically signed by Nguyen Garsia, PT on 5/19/2021 at 10:57 AM

## 2021-05-24 ENCOUNTER — OFFICE VISIT (OUTPATIENT)
Dept: NEUROSURGERY | Age: 51
End: 2021-05-24
Payer: MEDICAID

## 2021-05-24 VITALS
HEART RATE: 86 BPM | HEIGHT: 64 IN | DIASTOLIC BLOOD PRESSURE: 81 MMHG | SYSTOLIC BLOOD PRESSURE: 120 MMHG | WEIGHT: 200 LBS | BODY MASS INDEX: 34.15 KG/M2

## 2021-05-24 DIAGNOSIS — M54.50 CHRONIC BILATERAL LOW BACK PAIN WITHOUT SCIATICA: Primary | ICD-10-CM

## 2021-05-24 DIAGNOSIS — G89.29 CHRONIC BILATERAL LOW BACK PAIN WITHOUT SCIATICA: Primary | ICD-10-CM

## 2021-05-24 DIAGNOSIS — M51.36 DEGENERATIVE DISC DISEASE, LUMBAR: ICD-10-CM

## 2021-05-24 DIAGNOSIS — M50.30 DEGENERATIVE DISC DISEASE, CERVICAL: ICD-10-CM

## 2021-05-24 PROCEDURE — 99214 OFFICE O/P EST MOD 30 MIN: CPT | Performed by: NEUROLOGICAL SURGERY

## 2021-05-24 NOTE — PROGRESS NOTES
NEUROSURGERY FOLLOW UP      Chief Complaint:   Chief Complaint   Patient presents with    Follow-up     review imaging. HPI:   Return visit to discuss results of her recent lumbar and cervical MRI scans. Her main complaint today is lower back pain, mostly left sided, as well as some left leg pain. She also complains of ongoing neck pain radiating to both shoulders and upper arms. She cannot describe a clear radicular pattern to her neck or back pain. She does complain of some clumsiness in her hands, but denies numbness in her hands or weakness. She also endorses chronic balance difficulty, but denies any falls or difficulty with bowel or bladder control. She is still seeing pain management for her chronic pain issues. ROS:    Constitutional: Negative. HENT: Negative. Eyes: Negative. Respiratory: Negative. Cardiovascular: Negative. Gastrointestinal: Negative. Genitourinary: Negative. Musculoskeletal: Positive for back pain, joint pain and neck pain. Skin: Negative. Neurological: Negative. Endo/Heme/Allergies: Negative. Psychiatric/Behavioral: Negative.         Review of systems was obtained by the medical assistant and reviewed by myself. Objective:    /81   Pulse 86   Ht 5' 4\" (1.626 m)   Wt 200 lb (90.7 kg)   BMI 34.33 kg/m²         Physical Exam:    General: alert, cooperative, no distress  Cardiorespiratory: unlabored breathing      Neurologic Exam:    Mental Status: Alert, oriented, thought content appropriate  Cranial Nerves: PERRL, EOMI, symmetric facies, tongue midline  Motor: Motor exam is symmetrical 5 out of 5 all extremities bilaterally  Somatosensory: normal light touch sensation  Reflexes: 2+ at biceps and patella bilaterally, Rivas's absent bilaterally  Gait: Mildly antalgic, able to heel and toe walk. Minimal difficulty with tandem gait. Imaging:  MRI of the lumbar spine and cervical spine reviewed.     The MRI of the cervical spine reveals multilevel spondylosis, most notable at C4/5, C5/6 and C6/7. At C4/5 there is a small posterior disc/osteophyte but this does not cause any significant canal or foraminal stenosis. At C5/6 there is a sizable posterior disc/osteophyte that does contribute to left-sided foraminal stenosis and moderate central stenosis with some ventral deformation of the spinal cord, however there is no abnormal cord signal and CSF signal remains dorsal to the cord. At C6/7 there is loss of disc height but no significant canal or foraminal stenosis. The lumbar MRI was reviewed and there is significant degenerative disc disease and facet arthropathy at L4/5. There is no significant central, lateral recess or foraminal stenosis at any level in the lumbar spine. Assessment and Plan:  54 y/o returns for follow up and MRI review. Her chronic pain complaints persist.  I explained to her that I did not see any focal pathology on her cervical or lumbar MRI scan that correlates with her symptoms to believe that she would benefit from surgery. I recommended that she continue to follow up with pain management to discuss non-surgical modalities for treating her pain. She does have significant pathology at C5/6 on her c-spine MRI, however in the absence of myelopathy or a clear radiculopathy, I recommended that we follow this clinically. I will plan to see her again in six months.         Electronically signed by Eduar Skaggs MD on 5/24/2021 at 1:04 PM

## 2021-06-08 ENCOUNTER — HOSPITAL ENCOUNTER (OUTPATIENT)
Dept: PAIN MANAGEMENT | Age: 51
Discharge: HOME | End: 2021-06-08
Payer: MEDICAID

## 2021-06-08 VITALS
SYSTOLIC BLOOD PRESSURE: 118 MMHG | RESPIRATION RATE: 20 BRPM | OXYGEN SATURATION: 98 % | HEART RATE: 73 BPM | DIASTOLIC BLOOD PRESSURE: 78 MMHG | TEMPERATURE: 97.6 F

## 2021-06-08 DIAGNOSIS — M54.12 CERVICAL NEURALGIA: ICD-10-CM

## 2021-06-08 PROCEDURE — 2500000003 HC RX 250 WO HCPCS

## 2021-06-08 PROCEDURE — 6360000002 HC RX W HCPCS

## 2021-06-08 PROCEDURE — 3209999900 FLUORO FOR SURGICAL PROCEDURES

## 2021-06-08 PROCEDURE — 62321 NJX INTERLAMINAR CRV/THRC: CPT

## 2021-06-08 PROCEDURE — 2580000003 HC RX 258

## 2021-06-08 RX ORDER — METHYLPREDNISOLONE ACETATE 80 MG/ML
INJECTION, SUSPENSION INTRA-ARTICULAR; INTRALESIONAL; INTRAMUSCULAR; SOFT TISSUE
Status: COMPLETED | OUTPATIENT
Start: 2021-06-08 | End: 2021-06-08

## 2021-06-08 RX ORDER — LIDOCAINE HYDROCHLORIDE 10 MG/ML
INJECTION, SOLUTION EPIDURAL; INFILTRATION; INTRACAUDAL; PERINEURAL
Status: COMPLETED | OUTPATIENT
Start: 2021-06-08 | End: 2021-06-08

## 2021-06-08 RX ORDER — SODIUM CHLORIDE 9 MG/ML
INJECTION INTRAVENOUS
Status: COMPLETED | OUTPATIENT
Start: 2021-06-08 | End: 2021-06-08

## 2021-06-08 RX ADMIN — LIDOCAINE HYDROCHLORIDE 5 ML: 10 INJECTION, SOLUTION EPIDURAL; INFILTRATION; INTRACAUDAL; PERINEURAL at 12:21

## 2021-06-08 RX ADMIN — SODIUM CHLORIDE 5 ML: 9 INJECTION INTRAVENOUS at 12:21

## 2021-06-08 RX ADMIN — METHYLPREDNISOLONE ACETATE 80 MG: 80 INJECTION, SUSPENSION INTRA-ARTICULAR; INTRALESIONAL; INTRAMUSCULAR; SOFT TISSUE at 12:21

## 2021-06-08 ASSESSMENT — PAIN - FUNCTIONAL ASSESSMENT: PAIN_FUNCTIONAL_ASSESSMENT: 0-10

## 2021-06-08 NOTE — INTERVAL H&P NOTE
Update History & Physical    The patient's History and Physical  was reviewed with the patient and I examined the patient. There was  NO CHANGE:[id number]}. The surgical site was confirmed by the patient and me. Plan: The risks, benefits, expected outcome, and alternative to the recommended procedure have been discussed with the patient. Patient understands and wants to proceed with the procedure.      Electronically signed by Julio Cesar Cummings MD on 6/8/2021 at 12:17 PM

## 2021-06-14 ENCOUNTER — TELEPHONE (OUTPATIENT)
Dept: PAIN MANAGEMENT | Age: 51
End: 2021-06-14

## 2021-06-17 DIAGNOSIS — M79.18 BILATERAL MYOFASCIAL PAIN: ICD-10-CM

## 2021-06-17 PROBLEM — Z02.89 PAIN MEDICATION AGREEMENT: Status: ACTIVE | Noted: 2021-06-17

## 2021-06-17 RX ORDER — TIZANIDINE 4 MG/1
TABLET ORAL
Qty: 60 TABLET | Refills: 0 | OUTPATIENT
Start: 2021-06-17

## 2021-06-17 RX ORDER — TIZANIDINE 4 MG/1
TABLET ORAL
Qty: 60 TABLET | Refills: 2 | Status: SHIPPED | OUTPATIENT
Start: 2021-06-17 | End: 2021-09-27 | Stop reason: SDUPTHER

## 2021-06-28 DIAGNOSIS — M54.50 CHRONIC BILATERAL LOW BACK PAIN WITHOUT SCIATICA: ICD-10-CM

## 2021-06-28 DIAGNOSIS — G89.29 CHRONIC BILATERAL LOW BACK PAIN WITHOUT SCIATICA: ICD-10-CM

## 2021-06-30 RX ORDER — HYDROCODONE BITARTRATE AND ACETAMINOPHEN 7.5; 325 MG/1; MG/1
1 TABLET ORAL EVERY 8 HOURS PRN
Qty: 90 TABLET | Refills: 0 | Status: SHIPPED | OUTPATIENT
Start: 2021-06-30 | End: 2021-07-30

## 2021-08-03 ENCOUNTER — HOSPITAL ENCOUNTER (OUTPATIENT)
Dept: PAIN MANAGEMENT | Age: 51
Discharge: HOME | End: 2021-08-03
Payer: MEDICAID

## 2021-08-03 VITALS
WEIGHT: 209 LBS | DIASTOLIC BLOOD PRESSURE: 69 MMHG | RESPIRATION RATE: 16 BRPM | OXYGEN SATURATION: 98 % | HEIGHT: 65 IN | HEART RATE: 70 BPM | TEMPERATURE: 97.9 F | BODY MASS INDEX: 34.82 KG/M2 | SYSTOLIC BLOOD PRESSURE: 100 MMHG

## 2021-08-03 DIAGNOSIS — Z96.641 HISTORY OF RIGHT HIP REPLACEMENT: ICD-10-CM

## 2021-08-03 DIAGNOSIS — M62.830 BACK MUSCLE SPASM: ICD-10-CM

## 2021-08-03 DIAGNOSIS — M62.838 CERVICAL PARASPINAL MUSCLE SPASM: ICD-10-CM

## 2021-08-03 DIAGNOSIS — G89.29 CHRONIC BILATERAL LOW BACK PAIN WITHOUT SCIATICA: Primary | ICD-10-CM

## 2021-08-03 DIAGNOSIS — M53.3 SACROILIAC JOINT DYSFUNCTION OF LEFT SIDE: ICD-10-CM

## 2021-08-03 DIAGNOSIS — M54.50 CHRONIC BILATERAL LOW BACK PAIN WITHOUT SCIATICA: Primary | ICD-10-CM

## 2021-08-03 PROBLEM — M25.551 CHRONIC RIGHT HIP PAIN: Status: RESOLVED | Noted: 2019-10-30 | Resolved: 2021-08-03

## 2021-08-03 PROBLEM — M16.11 PRIMARY OSTEOARTHRITIS OF RIGHT HIP: Status: RESOLVED | Noted: 2019-12-03 | Resolved: 2021-08-03

## 2021-08-03 PROCEDURE — 99214 OFFICE O/P EST MOD 30 MIN: CPT | Performed by: NURSE PRACTITIONER

## 2021-08-03 PROCEDURE — 99213 OFFICE O/P EST LOW 20 MIN: CPT

## 2021-08-03 RX ORDER — HYDROCODONE BITARTRATE AND ACETAMINOPHEN 7.5; 325 MG/1; MG/1
1 TABLET ORAL EVERY 8 HOURS PRN
Qty: 90 TABLET | Refills: 0 | Status: SHIPPED | OUTPATIENT
Start: 2021-08-03 | End: 2021-09-07 | Stop reason: SDUPTHER

## 2021-08-03 ASSESSMENT — PAIN DESCRIPTION - PAIN TYPE: TYPE: CHRONIC PAIN

## 2021-08-03 ASSESSMENT — PAIN DESCRIPTION - ORIENTATION: ORIENTATION: LOWER;UPPER

## 2021-08-03 ASSESSMENT — ENCOUNTER SYMPTOMS
BACK PAIN: 1
CONSTIPATION: 0

## 2021-08-03 ASSESSMENT — PAIN SCALES - GENERAL: PAINLEVEL_OUTOF10: 7

## 2021-08-03 ASSESSMENT — PAIN DESCRIPTION - LOCATION: LOCATION: BACK;NECK

## 2021-08-03 NOTE — PROGRESS NOTES
[facility] Physical & Pain Medicine  Office Note    Patient Name: Rob New    MR #: [id number]    Account #: [de-identified]    : 1970    Age: 53 y.o. Sex: female    Date: 8/3/2021    PCP: JAEL Umanzor    Chief Complaint:   Chief Complaint   Patient presents with    Lower Back Pain    Neck Pain       History of Present Illness:     Rob New is a 53 y.o. female who presents to the office for follow up procedure. Patient had DILAN of C5-C6 on 2021. Patient states that she had no relief in pain. Patient wishes to focus back on low back pain. Patient has had bilateral luMbar trigger point injections with no relief in pain. patient states that low back pain worsened after she had right hip replacement. Patient states that pain increases when she is sitting for a prolonged period of time. Patient states that low back pain is predominantly on the left side and does at times wrap into the groin. Neck Pain   This is a chronic problem. The current episode started more than 1 year ago. The problem occurs constantly. The problem has been waxing and waning. Pain location: pain goes down neck right shoulder fells like hot water running down her arm. The quality of the pain is described as cramping, shooting and aching. The symptoms are aggravated by bending and twisting (one position for any length of time). Associated symptoms include numbness, tingling and weakness. Back Pain  This is a chronic problem. The current episode started more than 1 year ago. The problem occurs constantly. The problem has been waxing and waning since onset. The pain is present in the lumbar spine, sacro-iliac and thoracic spine. The quality of the pain is described as aching, burning, cramping, shooting and stabbing. Radiates to: into bilateral buttocks more to left. The symptoms are aggravated by sitting and standing. Associated symptoms include numbness, tingling and weakness. Screening Tools:    PE.3    Past PE    ORT: 1    PHQ-9: 12    Current Pain Assessment  Pain Assessment  Pain Assessment: 0-10  Pain Level: 7  Pain Type: Chronic pain  Pain Location: Back, Neck  Pain Orientation: Lower, Upper    Past Visit HPI:  2021  Patient has had back pain for years. She does not recall any specific injury. Patient states that pain will shoot down her buttocks into her legs at different times. Patient states that if she stands for long periods of time that her back will start to hurt and she feels like she needs to roll forward. Patient has not completed any physical therapy. Patient states that she has a hard time getting comfortable in any position. Patient states that in the afternoon she likes to lie down to flatten her back she feels better at first but she cannot get comfortable. Patient states that she has 100 % low back pain. Patient had hip replacement in 2020. Patient cannot do a lot of lifting, patient does not mop or sweep any longer (family does it) patient can't lift the laundry basket.      On 2020Xray of C Spine indicated degenerative changes with loss of cervical lordosis, Thoracic and Lumbar spine with chronic degenerative changes. MRI has been ordered by neurosurgery.        Past Medical History  Past Medical History:   Diagnosis Date    Abnormal Pap smear of cervix     GERD (gastroesophageal reflux disease)     Hip pain     Hyperlipidemia     Nephrolithiasis     Primary osteoarthritis of right hip 12/3/2019    Psoriasis        Surgery History  Past Surgical History:   Procedure Laterality Date    CHOLECYSTECTOMY      HYSTERECTOMY      HYSTERECTOMY, VAGINAL      No BSO    LITHOTRIPSY      TOTAL HIP ARTHROPLASTY Right 2020    HIP TOTAL ARTHROPLASTY ANTERIOR APPROACH performed by Paula Guzman MD at [address] History  family history includes Breast Cancer in her paternal grandmother; Coronary Art Dis in her father; Diabetes type 2  in her father; High Blood Pressure in her sister; High Cholesterol in her father and mother; Hypertension in her father and mother; Other in her father and sister. Social History    Social History     Tobacco Use    Smoking status: Former Smoker     Packs/day: 1.00     Years: 30.00     Pack years: 30.00     Types: Cigarettes     Quit date: 2019     Years since quittin.6    Smokeless tobacco: Never Used   Substance Use Topics    Alcohol use: Not Currently       Allergies  Patient has no known allergies. Current Medications  Current Outpatient Medications   Medication Sig Dispense Refill    HYDROcodone-acetaminophen (NORCO) 7.5-325 MG per tablet Take 1 tablet by mouth every 8 hours as needed for Pain for up to 30 days. Intended supply: 30 days 90 tablet 0    tiZANidine (ZANAFLEX) 4 MG tablet 1/4 tablet with meals 1/2 to whole tablet at bedtime 60 tablet 2    nabumetone (RELAFEN) 750 MG tablet Take 1 tablet by mouth 2 times daily 60 tablet 2    betamethasone dipropionate (DIPROLENE) 0.05 % cream Apply topically 2 times daily as needed for eczema. Avoid use on face, 45 g 1    pantoprazole (PROTONIX) 40 MG tablet Take 1 tablet by mouth every morning (before breakfast) 30 tablet 5     No current facility-administered medications for this encounter. Review of Systems:  Review of Systems   Constitutional: Positive for activity change. Gastrointestinal: Negative for constipation. Musculoskeletal: Positive for arthralgias, back pain, myalgias and neck pain. Neurological: Positive for tingling, weakness and numbness. Psychiatric/Behavioral: Positive for sleep disturbance. Negative for agitation, self-injury and suicidal ideas. The patient is not nervous/anxious.         14 point ROS negative besides that noted in HPI    Physical Exam:    Vitals:    21 1005   BP: 100/69   Pulse: 70   Resp: 16   Temp: 97.9 °F (36.6 °C)   TempSrc: Temporal   SpO2: 98%   Weight: 209 lb (94.8 kg)   Height: 5' 5\" (1.651 m)       Body mass index is 34.78 kg/m². Physical Exam  Vitals and nursing note reviewed. Constitutional:       General: She is not in acute distress. Appearance: She is well-developed. HENT:      Head: Normocephalic. Right Ear: External ear normal.      Left Ear: External ear normal.      Nose: Nose normal.   Eyes:      Conjunctiva/sclera: Conjunctivae normal.      Pupils: Pupils are equal, round, and reactive to light. Neck:      Vascular: No JVD. Trachea: No tracheal deviation. Cardiovascular:      Rate and Rhythm: Normal rate. Pulmonary:      Effort: Pulmonary effort is normal.   Abdominal:      General: There is no distension. Tenderness: There is no abdominal tenderness. Musculoskeletal:      Cervical back: Spasms and tenderness present. Pain with movement present. Lumbar back: Spasms and tenderness present. Positive left straight leg raise test. Negative right straight leg raise test.      Comments: Patient points to on left SI joint as the source of low back pain  TTP of SI joint on left  positive Jose's on left, positive Distraction on left, positive Thigh Thrust on left     Skin:     General: Skin is warm and dry. Neurological:      Mental Status: She is alert and oriented to person, place, and time. Cranial Nerves: No cranial nerve deficit. Psychiatric:         Behavior: Behavior normal.         Thought Content:  Thought content normal.         Judgment: Judgment normal.         Labs:  Lab Results   Component Value Date     02/29/2020    K 3.6 02/29/2020     02/29/2020    CO2 29 02/29/2020    BUN 7 02/29/2020    CREATININE 0.5 02/29/2020    GLUCOSE 101 02/29/2020    CALCIUM 7.9 02/29/2020        Lab Results   Component Value Date    WBC 7.2 01/10/2020    HGB 7.6 (L) 02/29/2020    HCT 22.8 (L) 02/29/2020    MCV 95.4 01/10/2020     01/10/2020       Assessment:  Active Problems:    Chronic bilateral low back pain without sciatica    Chronic neck pain    Degenerative disc disease, cervical    Degenerative disc disease, lumbar    Degenerative disc disease, thoracic    Chronic midline thoracic back pain    Bilateral myofascial pain    Pain medication agreement    Sacroiliac joint dysfunction of left side    Cervical paraspinal muscle spasm    Back muscle spasm    History of right hip replacement  Resolved Problems:    Chronic right hip pain    Primary osteoarthritis of right hip      Plan:  Chronic bilateral low back pain without sciatica  Continue with refill - HYDROcodone-acetaminophen (NORCO) 7.5-325 MG per tablet; Take 1 tablet by mouth every 8 hours as needed for Pain for up to 30 days. Intended supply: 30 days  Dispense: 90 tablet; Refill: 0    Sacroiliac joint dysfunction of left side  Schedule Left SI injection with US with NP. Patient states that she does have leg length difference since hip replacement per orthopedic surgeon. Cervical paraspinal muscle spasm  Back muscle spasm  Continue Zanaflex gradual taper. Patient given exercises for SI joint dysfunction. Patient states that Relafen is ineffective. Patient has been taking OTC ibuprofen. Discussed patient not taking both medications as they are the same and that this could cause renal failure. Patient instructed to try over-the-counter Motrin dual action which has Tylenol and ibuprofen together. Patient instructed to be careful with Tylenol and pain medication and over-the-counter and not to exceed 3000 mg per 24-hour. Order placed for NexWave 3 in 1 device. Patient suffers from . Patient given product information at appointment. The NexWave is interferential, TENS and NMES. These 3 clinically proven modalities are used to help reduce pain symptoms, to help retrain and strengthen muscles and to help reduce the need for pain (opioid) medications.      [x] Follow up    [] 4 weeks   [x] 6-8 weeks   [] 10-12 weeks   [] 3 months  [x] Post procedure to evaluate effectiveness of treatment  [] To evaluate medications changes made at office visit. [] To review diagnostics ordered at last visit. [] To evaluate response to therapy    [x] For management of controlled substance  [x] Random UDS as indicated by ORT score or if indicated by abberent behaviors    Discussion: Discussed exam findings and plan of care with patient. Patient agreed with POC and questions were asked and answered. Activity: Discussed exercise as beneficial to pain reduction, encouraged daily stretching with a focus on torso strengthening. Goal:  Pain Management Goals of Therapy:   [] Resolution in pain  [x] Decrease in pain level  [x] Improvement in ADL's  [x] Increase in activities with less pain  [] Decrease in medication      Controlled substance monitoring:    [x] Discussed medication side effects, risk of tolerance and/or dependence, and/or alternative treatment  [] Discussed the detrimental effects of long term narcotic use in younger patients especially women of childbearing years.   [x] No signs and symptoms of potential drug abuse or diversion were identified  [] Signs of potential drug abuse or diversion were identified   [] ORT Score   [] UDS non-compliant   [] See Note  [] Random urine drug screen sent today  [x] Random urine drug screen not completed today   [] Deferred New Patient  [x] Compliant  3/1/2021  [] Not Compliant see note  [] Medication agreement with provider signed today 1/21/2021  [] Medication agreement with provider on file under media   [] Medication regimen effective with no c/o side effects and continued   [] New patient continuing current medication while developing POC   [x] On going assessment and evaluation of medication regimen  [] Medication regimen not effective see plan for changes  [x] Robert Herbert reviewed & on file under media       CC:  JAEL Blackburn, APRN - CNP, 8/3/2021 at 7:48 PM    EMR dragon/transcription disclaimer: Much of this encounter note is electronic transcription/translation of spoken language to printed tach. Electronic translation of spoken language may be erroneous, or at times, nonsensical words or phrases may be inadvertently transcribed.  Although, I have reviewed the note for such errors, some may still exist.

## 2021-08-03 NOTE — PROGRESS NOTES
Clinic Documentation      Education Provided:  [x] Review of Arnita Councilman  [] Agreement Review  [x] PEG Score Calculated [] PHQ Score Calculated [] ORT Score Calculated    [] Compliance Issues Discussed [] Cognitive Behavior Needs [x] Exercise [] Review of Test [] Financial Issues  [x] Tobacco/Alcohol Use Reviewed [x] Teaching [] New Patient [] Picture Obtained    Physician Plan:  [] Outgoing Referral  [] Pharmacy Consult  [] Test Ordered [x] Prescription Ordered/Changed   [] Obtained Test Results / Consult Notes        Complete if patient is withholding blood thinner for procedure     Blood Thinner Patient is currently taking:      [] Plavix (Hold for 7 days)  [] Aspirin (Hold for 5 days)     [] Pletal (Hold for 2 days)  [] Pradaxa (Hold for 3 days)    [] Effient (Hold for 7 days)  [] Xarelto (Hold for 2 days)    [] Eliquis (Hold for 2 days)  [] Brilinta (Hold for 7 days)    [] Coumadin (Hold for 5 days) - (INR needs to be drawn the day prior to procedure- INR < 2.0)    [] Aggrenox (Hold for 7 days)        [] Patient will stop medication on their own.    [] Blood Thinner Form Faxed for approval to hold.    Provider form faxed to:    Assessment Completed by:  Electronically signed by Ana Hernandez RN on 8/3/2021 at 9:49 AM

## 2021-08-18 ENCOUNTER — HOSPITAL ENCOUNTER (OUTPATIENT)
Dept: PAIN MANAGEMENT | Age: 51
Discharge: HOME | End: 2021-08-18
Payer: MEDICAID

## 2021-08-18 VITALS
HEART RATE: 60 BPM | RESPIRATION RATE: 18 BRPM | OXYGEN SATURATION: 97 % | SYSTOLIC BLOOD PRESSURE: 104 MMHG | DIASTOLIC BLOOD PRESSURE: 65 MMHG | TEMPERATURE: 97.8 F

## 2021-08-18 PROCEDURE — 2500000003 HC RX 250 WO HCPCS

## 2021-08-18 PROCEDURE — 20611 DRAIN/INJ JOINT/BURSA W/US: CPT

## 2021-08-18 PROCEDURE — 6360000002 HC RX W HCPCS

## 2021-08-18 PROCEDURE — 76942 ECHO GUIDE FOR BIOPSY: CPT | Performed by: NURSE PRACTITIONER

## 2021-08-18 PROCEDURE — 20552 NJX 1/MLT TRIGGER POINT 1/2: CPT | Performed by: NURSE PRACTITIONER

## 2021-08-18 RX ORDER — LIDOCAINE HYDROCHLORIDE 10 MG/ML
2 INJECTION, SOLUTION EPIDURAL; INFILTRATION; INTRACAUDAL; PERINEURAL ONCE
Status: DISCONTINUED | OUTPATIENT
Start: 2021-08-18 | End: 2021-08-20 | Stop reason: HOSPADM

## 2021-08-18 RX ORDER — BUPIVACAINE HYDROCHLORIDE 5 MG/ML
2 INJECTION, SOLUTION EPIDURAL; INTRACAUDAL ONCE
Status: DISCONTINUED | OUTPATIENT
Start: 2021-08-18 | End: 2021-08-20 | Stop reason: HOSPADM

## 2021-08-18 RX ORDER — TRIAMCINOLONE ACETONIDE 40 MG/ML
40 INJECTION, SUSPENSION INTRA-ARTICULAR; INTRAMUSCULAR ONCE
Status: DISCONTINUED | OUTPATIENT
Start: 2021-08-18 | End: 2021-08-20 | Stop reason: HOSPADM

## 2021-08-18 NOTE — PROCEDURES
[facility] Physical & Pain Medicine    Patient Name: Elba Mcdaniel    : 1970    Age: 53 y.o. Sex: female    Date: 2021    Pre-op Diagnosis: left  Sacroiliac Joint(s) Dysfunction/ Sacroiliitis    Post-op Diagnosis: left  Sacroiliac Joint(s) Dysfunction/ Sacroliliits    Procedure: Ultrasound Guided Injection of  left Sacroiliac Joint(s)     Performing Procedure:  Flori Sanders, ACAGNP - [facility], [facility]    Patient Vitals for the past 24 hrs:   BP Temp Temp src Pulse Resp SpO2   21 0910 104/65 97.8 °F (36.6 °C) Temporal 60 18 97 %       left  Sacroiliac Joint(s)     Description of Procedure:    After voluntary, informed and signed consent obtained the patient was placed in a prone position. Appropriate time out was obtained per policy. The Sacroiliac Joint(s) was palpated for area of maximal tenderness. The area was prepped in an aseptic fashion with CHG swab. The ultrasound transducer was used to confirm the appropriate location. The skin was sprayed with Gebauer's Solution. Under aseptic technique and direct ultrasound visualization a 22 gauge 3 inch spinal needle was introduced into the Sacroiliac Joint(s). After a negative aspiration, a solution of 2 ml of 0.5% Marcaine Plain and 2 ml of 1% Lidocaine Plain and 1 ml of Kenalog (40 mg/ml) was injected into the Sacroiliac Joint(s). The needle was withdrawn and a sterile dressing applied. If this was a bilateral procedure, the same steps were followed on the opposite side. Discharge: The patient tolerated the procedure well. There were no complications during the procedure and the patient was discharged home with discharge instructions. The patient has been instructed to contact the office should there be any complications or questions to arise between today and their next appointment. Plan:    The patient will follow up in the office in approximately 6 weeks.      JAEL Villa CNP, 2021 at 9:37 AM
Yes

## 2021-08-18 NOTE — PROGRESS NOTES
Procedure:  Level of Consciousness: [x]Alert [x]Oriented []Disoriented []Lethargic  Anxiety Level: [x]Calm []Anxious []Depressed []Other  Skin: []Warm [x]Dry []Cool []Moist []Intact []Other  Cardiovascular: [x]Palpitations: []Never []Occasionally []Frequently  Chest Pain: [x]No []Yes  Respiratory:  [x]Unlabored []Labored []Cough ([] Productive []Unproductive)  HCG Required: [x]No []Yes   Results: []Negative []Positive  Knowledge Level:        [x]Patient/Other verbalized understanding of pre-procedure instructions. [x]Assessment of post-op care needs (transportation, responsible caregiver)        [x]Able to discuss health care problems and how to deal with it.   Factors that Affect Teaching:        Language Barrier: [x]No []Yes - why:        Hearing Loss:        [x]No []Yes            Corrective Device:  []Yes []No        Vision Loss:           []No [x]Yes            Corrective Device:  [x]Yes []No        Memory Loss:       [x]No []Yes            []Short Term []Long Term  Motivational Level:  [x]Asks Questions                  []Extremely Anxious       [x]Seems Interested               []Seems Uninterested                  [x]Denies need for Education  Risk for Injury:  [x]Patient oriented to person, place and time  [x]History of frequent falls/loss of balance  Nutritional:  []Change in appetite   []Weight Gain   []Weight Loss  Functional:  []Requires assistance with ADL's

## 2021-08-24 ENCOUNTER — TELEPHONE (OUTPATIENT)
Dept: PAIN MANAGEMENT | Age: 51
End: 2021-08-24

## 2021-08-24 NOTE — TELEPHONE ENCOUNTER
Spoke with pt concerning her injection she had on 08/18. Pt states she is not a whole lot better. States she is only maybe 10% better and a 7/10 on the pain scale. She also noted that in the car ride home she had what she described at  jolting\" pains that only lasted about 15 mins. Has not had these since that day. No further questions at this time.

## 2021-09-07 DIAGNOSIS — G89.29 CHRONIC NECK PAIN: ICD-10-CM

## 2021-09-07 DIAGNOSIS — G89.29 CHRONIC BILATERAL LOW BACK PAIN WITHOUT SCIATICA: ICD-10-CM

## 2021-09-07 DIAGNOSIS — M54.2 CHRONIC NECK PAIN: ICD-10-CM

## 2021-09-07 DIAGNOSIS — M54.50 CHRONIC BILATERAL LOW BACK PAIN WITHOUT SCIATICA: ICD-10-CM

## 2021-09-08 RX ORDER — HYDROCODONE BITARTRATE AND ACETAMINOPHEN 7.5; 325 MG/1; MG/1
1 TABLET ORAL EVERY 8 HOURS PRN
Qty: 90 TABLET | Refills: 0 | Status: SHIPPED | OUTPATIENT
Start: 2021-09-08 | End: 2021-10-08

## 2021-09-08 RX ORDER — NABUMETONE 750 MG/1
750 TABLET, FILM COATED ORAL 2 TIMES DAILY
Qty: 60 TABLET | Refills: 2 | Status: SHIPPED
Start: 2021-09-08 | End: 2021-10-05

## 2021-09-27 DIAGNOSIS — M79.18 BILATERAL MYOFASCIAL PAIN: ICD-10-CM

## 2021-09-27 RX ORDER — TIZANIDINE 4 MG/1
TABLET ORAL
Qty: 60 TABLET | Refills: 2 | Status: SHIPPED | OUTPATIENT
Start: 2021-09-27 | End: 2022-01-04 | Stop reason: SDUPTHER

## 2021-10-05 ENCOUNTER — HOSPITAL ENCOUNTER (OUTPATIENT)
Dept: PAIN MANAGEMENT | Age: 51
Discharge: HOME | End: 2021-10-05
Payer: MEDICAID

## 2021-10-05 VITALS
HEART RATE: 66 BPM | BODY MASS INDEX: 37.32 KG/M2 | TEMPERATURE: 98.4 F | HEIGHT: 65 IN | WEIGHT: 224 LBS | OXYGEN SATURATION: 97 % | RESPIRATION RATE: 18 BRPM | SYSTOLIC BLOOD PRESSURE: 127 MMHG | DIASTOLIC BLOOD PRESSURE: 82 MMHG

## 2021-10-05 DIAGNOSIS — R20.0 NUMBNESS AND TINGLING IN BOTH HANDS: ICD-10-CM

## 2021-10-05 DIAGNOSIS — M79.10 MYALGIA: ICD-10-CM

## 2021-10-05 DIAGNOSIS — G89.29 CHRONIC MIDLINE THORACIC BACK PAIN: ICD-10-CM

## 2021-10-05 DIAGNOSIS — E55.9 VITAMIN D DEFICIENCY: Primary | ICD-10-CM

## 2021-10-05 DIAGNOSIS — R20.2 NUMBNESS AND TINGLING IN BOTH HANDS: ICD-10-CM

## 2021-10-05 DIAGNOSIS — Z00.00 PREVENTATIVE HEALTH CARE: ICD-10-CM

## 2021-10-05 DIAGNOSIS — M54.6 CHRONIC MIDLINE THORACIC BACK PAIN: ICD-10-CM

## 2021-10-05 PROCEDURE — 99214 OFFICE O/P EST MOD 30 MIN: CPT | Performed by: NURSE PRACTITIONER

## 2021-10-05 PROCEDURE — 99214 OFFICE O/P EST MOD 30 MIN: CPT

## 2021-10-05 RX ORDER — HYDROCODONE BITARTRATE AND ACETAMINOPHEN 7.5; 325 MG/1; MG/1
1 TABLET ORAL EVERY 8 HOURS PRN
Qty: 90 TABLET | Refills: 0 | Status: SHIPPED | OUTPATIENT
Start: 2021-10-08 | End: 2021-11-09 | Stop reason: SDUPTHER

## 2021-10-05 ASSESSMENT — PAIN DESCRIPTION - LOCATION: LOCATION: BACK;NECK

## 2021-10-05 ASSESSMENT — PAIN SCALES - GENERAL: PAINLEVEL_OUTOF10: 7

## 2021-10-05 ASSESSMENT — PAIN DESCRIPTION - PAIN TYPE: TYPE: CHRONIC PAIN

## 2021-10-05 ASSESSMENT — ENCOUNTER SYMPTOMS
CONSTIPATION: 0
BACK PAIN: 1

## 2021-10-05 ASSESSMENT — PAIN DESCRIPTION - ORIENTATION: ORIENTATION: LOWER;UPPER

## 2021-10-05 NOTE — PROGRESS NOTES
[facility]  Office Note    Patient Name: Vivek Magana    MR #: [id number]    Account #: [de-identified]    : 1970    Age: 53 y.o. Sex: female    Date: 10/5/2021    PCP: JAEL Perez    Chief Complaint:   Chief Complaint   Patient presents with    Lower Back Pain    Neck Pain       History of Present Illness:     Vivek Magana is a 53 y.o. female who presents for procedure follow-up patient had left SI joint injection on 2021 and patient states she had no relief from the injection. Patient did get Nexwave device and states that this is helpful. Patient states that she is starting to have pain going across her shoulder blades especially more on the right side. Neck Pain   This is a chronic problem. The current episode started more than 1 year ago. The problem occurs constantly. The problem has been waxing and waning. Pain location: pain goes down neck right shoulder fells like hot water running down her arm. The quality of the pain is described as aching, cramping and shooting. The symptoms are aggravated by twisting and bending (one position for any length of time). Associated symptoms include numbness, tingling and weakness. Back Pain  This is a chronic problem. The current episode started more than 1 year ago. The problem occurs constantly. The problem has been gradually worsening since onset. The pain is present in the lumbar spine, sacro-iliac and thoracic spine. The quality of the pain is described as stabbing, shooting, cramping, burning and aching. Radiates to: into bilateral buttocks more to left. The symptoms are aggravated by sitting and standing. Associated symptoms include numbness, tingling and weakness.        Screening Tools:    PEG: 10    Past PE.3    ORT: 1    PHQ-9: 12    Current Pain Assessment  Pain Assessment  Pain Assessment: 0-10  Pain Level: 7  Pain Type: Chronic pain  Pain Location: Back, Neck  Pain Orientation: Lower, Upper  POSS Score (Patient Ctrl Analgesia): 1    Past Visit HPI:  1/2021  Patient has had back pain for years. She does not recall any specific injury. Patient states that pain will shoot down her buttocks into her legs at different times. Patient states that if she stands for long periods of time that her back will start to hurt and she feels like she needs to roll forward. Patient has not completed any physical therapy. Patient states that she has a hard time getting comfortable in any position. Patient states that in the afternoon she likes to lie down to flatten her back she feels better at first but she cannot get comfortable. Patient states that she has 100 % low back pain. Patient had hip replacement in Feb 2020. Patient cannot do a lot of lifting, patient does not mop or sweep any longer (family does it) patient can't lift the laundry basket.      On 9/29/2020Xray of C Spine indicated degenerative changes with loss of cervical lordosis, Thoracic and Lumbar spine with chronic degenerative changes. MRI has been ordered by neurosurgery. Past Medical History  Past Medical History:   Diagnosis Date    Abnormal Pap smear of cervix     GERD (gastroesophageal reflux disease)     Hip pain     Hyperlipidemia     Nephrolithiasis     Primary osteoarthritis of right hip 12/3/2019    Psoriasis        Surgery History  Past Surgical History:   Procedure Laterality Date    CHOLECYSTECTOMY      HYSTERECTOMY      HYSTERECTOMY, VAGINAL      No BSO    LITHOTRIPSY      TOTAL HIP ARTHROPLASTY Right 2/27/2020    HIP TOTAL ARTHROPLASTY ANTERIOR APPROACH performed by Anita Siegel MD at [address] History  family history includes Breast Cancer in her paternal grandmother; Coronary Art Dis in her father; Diabetes type 2  in her father; High Blood Pressure in her sister; High Cholesterol in her father and mother; Hypertension in her father and mother;  Other in her father and sister. Social History    Social History     Tobacco Use    Smoking status: Former Smoker     Packs/day: 1.00     Years: 30.00     Pack years: 30.00     Types: Cigarettes     Quit date: 2019     Years since quittin.8    Smokeless tobacco: Never Used   Substance Use Topics    Alcohol use: Not Currently       Allergies  Patient has no known allergies. Current Medications  Current Outpatient Medications   Medication Sig Dispense Refill    tiZANidine (ZANAFLEX) 4 MG tablet 1/4 tablet with meals 1/2 to whole tablet at bedtime 60 tablet 2    nabumetone (RELAFEN) 750 MG tablet Take 1 tablet by mouth 2 times daily 60 tablet 2    HYDROcodone-acetaminophen (NORCO) 7.5-325 MG per tablet Take 1 tablet by mouth every 8 hours as needed for Pain for up to 30 days. Intended supply: 30 days 90 tablet 0    betamethasone dipropionate (DIPROLENE) 0.05 % cream Apply topically 2 times daily as needed for eczema. Avoid use on face, 45 g 1    pantoprazole (PROTONIX) 40 MG tablet Take 1 tablet by mouth every morning (before breakfast) 30 tablet 5     No current facility-administered medications for this encounter. Review of Systems:  Review of Systems   Constitutional: Positive for activity change. Gastrointestinal: Negative for constipation. Musculoskeletal: Positive for arthralgias, back pain, myalgias, neck pain and neck stiffness. Neurological: Positive for tingling, weakness and numbness. Psychiatric/Behavioral: Positive for sleep disturbance. Negative for agitation, self-injury and suicidal ideas. The patient is not nervous/anxious. 14 point ROS negative besides that noted in HPI    Physical Exam:    Vitals:    10/05/21 1042   BP: 127/82   Pulse: 66   Resp: 18   Temp: 98.4 °F (36.9 °C)   TempSrc: Temporal   SpO2: 97%   Weight: 224 lb (101.6 kg)   Height: 5' 5\" (1.651 m)       Body mass index is 37.28 kg/m². Physical Exam  Vitals and nursing note reviewed.    Constitutional: General: She is not in acute distress. Appearance: She is well-developed. HENT:      Head: Normocephalic. Right Ear: External ear normal.      Left Ear: External ear normal.      Nose: Nose normal.   Eyes:      Conjunctiva/sclera: Conjunctivae normal.      Pupils: Pupils are equal, round, and reactive to light. Neck:      Vascular: No JVD. Trachea: No tracheal deviation. Cardiovascular:      Rate and Rhythm: Normal rate. Pulmonary:      Effort: Pulmonary effort is normal.   Abdominal:      General: There is no distension. Tenderness: There is no abdominal tenderness. Musculoskeletal:      Cervical back: Spasms (tender palpable knots i.e. trigger points identified) and tenderness present. Pain with movement present. Thoracic back: Spasms (tender palpable knots i.e. trigger points identified) present. Lumbar back: Spasms (tender palpable knots i.e. trigger points identified) and tenderness present. Negative right straight leg raise test.      Comments: Patient points to on left SI joint as the source of low back pain  TTP of SI joint on left  positive Jose's on left, positive Distraction on left, positive Thigh Thrust on left     Skin:     General: Skin is warm and dry. Neurological:      Mental Status: She is alert and oriented to person, place, and time. Cranial Nerves: No cranial nerve deficit. Psychiatric:         Behavior: Behavior normal.         Thought Content:  Thought content normal.         Judgment: Judgment normal.         Labs:  Lab Results   Component Value Date     02/29/2020    K 3.6 02/29/2020     02/29/2020    CO2 29 02/29/2020    BUN 7 02/29/2020    CREATININE 0.5 02/29/2020    GLUCOSE 101 02/29/2020    CALCIUM 7.9 02/29/2020        Lab Results   Component Value Date    WBC 7.2 01/10/2020    HGB 7.6 (L) 02/29/2020    HCT 22.8 (L) 02/29/2020    MCV 95.4 01/10/2020     01/10/2020       Assessment:  Active Problems:    * No active hospital problems. *  Resolved Problems:    * No resolved hospital problems. *      Plan:  Chronic bilateral low back pain without sciatica  Continue with refill sent for fill date of 10/8/2021.- HYDROcodone-acetaminophen (NORCO) 7.5-325 MG per tablet; Take 1 tablet by mouth every 8 hours as needed for Pain for up to 30 days. Intended supply: 30 days  Dispense: 90 tablet; Refill: 0       Cervical paraspinal muscle spasm  Back muscle spasm  Continue Zanaflex gradual taper. Schedule bilateral thoracic and lumbar trigger point injections due to myofascial pain. Continue nexwave device. [x] Follow up    [] 4 weeks   [x] 6-8 weeks   [] 10-12 weeks   [] 3 months  [x] Post procedure to evaluate effectiveness of treatment  [] To evaluate medications changes made at office visit. [] To review diagnostics ordered at last visit. [] To evaluate response to therapy    [x] For management of controlled substance  [x] Random UDS as indicated by ORT score or if indicated by abberent behaviors    Discussion: Discussed exam findings and plan of care with patient. Patient agreed with POC and questions were asked and answered. Activity: Discussed exercise as beneficial to pain reduction, encouraged daily stretching with a focus on torso strengthening. Goal:  Pain Management Goals of Therapy:   [] Resolution in pain  [x] Decrease in pain level  [x] Improvement in ADL's  [x] Increase in activities with less pain  [] Decrease in medication      Controlled substance monitoring:    [x] Discussed medication side effects, risk of tolerance and/or dependence, and/or alternative treatment  [] Discussed the detrimental effects of long term narcotic use in younger patients especially women of childbearing years.   [x] No signs and symptoms of potential drug abuse or diversion were identified  [] Signs of potential drug abuse or diversion were identified   [] ORT Score   [] UDS non-compliant   [] See Note  [] Random urine drug screen sent today  [x] Random urine drug screen not completed today   [] Deferred New Patient  [x] Compliant  3/1/2021  [] Not Compliant see note  [x] Medication agreement with provider signed today 1/21/2021  [] Medication agreement with provider on file under media   [] Medication regimen effective with no c/o side effects and continued   [] New patient continuing current medication while developing POC   [x] On going assessment and evaluation of medication regimen  [] Medication regimen not effective see plan for changes  [x] Climmie Amel reviewed & on file under media       CC:  JAEL Palencia APRN - CNP, 10/5/2021 at 11:16 AM    EMR dragon/transcription disclaimer: Much of this encounter note is electronic transcription/translation of spoken language to printed tach. Electronic translation of spoken language may be erroneous, or at times, nonsensical words or phrases may be inadvertently transcribed.  Although, I have reviewed the note for such errors, some may still exist.

## 2021-10-05 NOTE — PROGRESS NOTES
Clinic Documentation      Education Provided:  [x] Review of Alvaro Castellon  [] Agreement Review  [x] PEG Score Calculated [] PHQ Score Calculated [] ORT Score Calculated    [] Compliance Issues Discussed [] Cognitive Behavior Needs [x] Exercise [] Review of Test [] Financial Issues  [x] Tobacco/Alcohol Use Reviewed [x] Teaching [] New Patient [] Picture Obtained    Physician Plan:  [] Outgoing Referral  [] Pharmacy Consult  [x] Test Ordered [x] Prescription Ordered/Changed   [] Obtained Test Results / Consult Notes        Complete if patient is withholding blood thinner for procedure     Blood Thinner Patient is currently taking:      [] Plavix (Hold for 7 days)  [] Aspirin (Hold for 5 days)     [] Pletal (Hold for 2 days)  [] Pradaxa (Hold for 3 days)    [] Effient (Hold for 7 days)  [] Xarelto (Hold for 2 days)    [] Eliquis (Hold for 2 days)  [] Brilinta (Hold for 7 days)    [] Coumadin (Hold for 5 days) - (INR needs to be drawn the day prior to procedure- INR < 2.0)    [] Aggrenox (Hold for 7 days)        [] Patient will stop medication on their own.    [] Blood Thinner Form Faxed for approval to hold.    Provider form faxed to:     Assessment Completed by:  Electronically signed by Albaro Daugherty RN on 10/5/2021 at 10:38 AM

## 2021-10-06 ENCOUNTER — TELEPHONE (OUTPATIENT)
Dept: PAIN MANAGEMENT | Age: 51
End: 2021-10-06

## 2021-10-06 NOTE — TELEPHONE ENCOUNTER
Notified patient of lab results per Barry ELDER. Instructed patient to call and schedule appointment with PCP per Lisa's recommendation. Patient verbalized understanding.

## 2021-10-12 ENCOUNTER — OFFICE VISIT (OUTPATIENT)
Dept: FAMILY MEDICINE CLINIC | Age: 51
End: 2021-10-12
Payer: MEDICAID

## 2021-10-12 VITALS
SYSTOLIC BLOOD PRESSURE: 128 MMHG | WEIGHT: 220.2 LBS | OXYGEN SATURATION: 96 % | HEART RATE: 68 BPM | HEIGHT: 65 IN | DIASTOLIC BLOOD PRESSURE: 82 MMHG | BODY MASS INDEX: 36.69 KG/M2 | RESPIRATION RATE: 16 BRPM | TEMPERATURE: 97.3 F

## 2021-10-12 DIAGNOSIS — E34.9 ELEVATED PARATHYROID HORMONE: ICD-10-CM

## 2021-10-12 DIAGNOSIS — F51.01 PRIMARY INSOMNIA: ICD-10-CM

## 2021-10-12 DIAGNOSIS — E55.9 VITAMIN D DEFICIENCY: Primary | ICD-10-CM

## 2021-10-12 PROCEDURE — 99214 OFFICE O/P EST MOD 30 MIN: CPT | Performed by: NURSE PRACTITIONER

## 2021-10-12 RX ORDER — TRAZODONE HYDROCHLORIDE 50 MG/1
50-100 TABLET ORAL NIGHTLY PRN
Qty: 180 TABLET | Refills: 1 | Status: SHIPPED | OUTPATIENT
Start: 2021-10-12 | End: 2022-01-04

## 2021-10-12 RX ORDER — ERGOCALCIFEROL 1.25 MG/1
50000 CAPSULE ORAL WEEKLY
Qty: 12 CAPSULE | Refills: 1 | Status: SHIPPED | OUTPATIENT
Start: 2021-10-12 | End: 2022-03-14

## 2021-10-12 RX ORDER — OMEPRAZOLE 20 MG/1
20 CAPSULE, DELAYED RELEASE ORAL
Qty: 90 CAPSULE | Refills: 1 | Status: SHIPPED | OUTPATIENT
Start: 2021-10-12 | End: 2022-03-29 | Stop reason: SDUPTHER

## 2021-10-12 ASSESSMENT — PATIENT HEALTH QUESTIONNAIRE - PHQ9
2. FEELING DOWN, DEPRESSED OR HOPELESS: 0
1. LITTLE INTEREST OR PLEASURE IN DOING THINGS: 0
SUM OF ALL RESPONSES TO PHQ QUESTIONS 1-9: 0
SUM OF ALL RESPONSES TO PHQ9 QUESTIONS 1 & 2: 0

## 2021-10-12 ASSESSMENT — ENCOUNTER SYMPTOMS
WHEEZING: 0
CHEST TIGHTNESS: 0
SHORTNESS OF BREATH: 0
NAUSEA: 0
COUGH: 0
SORE THROAT: 0
DIARRHEA: 0
ABDOMINAL PAIN: 0

## 2021-10-12 NOTE — PROGRESS NOTES
Sveta Alfaro is a 53 y.o. female who presents today for  Chief Complaint   Patient presents with    Follow-up    Discuss Labs    Insomnia     difficulty falling and staying asleep       HPI:  She had recent labs per pain management showing elevated PTH and low vitamin D. Calcium was normal.  She does not take any vitamin D supplementation. She does have a history of vitamin D deficiency and took weekly ergocalciferol briefly a couple of years ago. Followed by pain management for chronic back pain. They are managing her hydrocodone. She has been getting injections. She has had intermittent tingling/pain in tips of pinky fingers. She has chronic insomnia. Difficulty falling asleep and staying asleep. She has tried Tylenol PM, melatonin which were ineffective. Review of Systems   Constitutional: Negative for chills and fever. HENT: Negative for congestion, ear pain and sore throat. Respiratory: Negative for cough, chest tightness, shortness of breath and wheezing. Cardiovascular: Negative for chest pain. Gastrointestinal: Negative for abdominal pain, diarrhea and nausea. Musculoskeletal: Negative for arthralgias and myalgias. Skin: Negative for rash. Psychiatric/Behavioral: Positive for sleep disturbance (chronic).        Past Medical History:   Diagnosis Date    Abnormal Pap smear of cervix     GERD (gastroesophageal reflux disease)     Hip pain     Hyperlipidemia     Nephrolithiasis     Primary osteoarthritis of right hip 12/3/2019    Psoriasis        Current Outpatient Medications   Medication Sig Dispense Refill    vitamin D (ERGOCALCIFEROL) 1.25 MG (13895 UT) CAPS capsule Take 1 capsule by mouth once a week 12 capsule 1    omeprazole (PRILOSEC) 20 MG delayed release capsule Take 1 capsule by mouth every morning (before breakfast) 90 capsule 1    traZODone (DESYREL) 50 MG tablet Take 1-2 tablets by mouth nightly as needed for Sleep 180 tablet 1    HYDROcodone-acetaminophen (NORCO) 7.5-325 MG per tablet Take 1 tablet by mouth every 8 hours as needed for Pain for up to 30 days. Intended supply: 30 days 90 tablet 0    tiZANidine (ZANAFLEX) 4 MG tablet 1/4 tablet with meals 1/2 to whole tablet at bedtime 60 tablet 2    betamethasone dipropionate (DIPROLENE) 0.05 % cream Apply topically 2 times daily as needed for eczema. Avoid use on face, 45 g 1     No current facility-administered medications for this visit. No Known Allergies    Past Surgical History:   Procedure Laterality Date    CHOLECYSTECTOMY      HYSTERECTOMY      HYSTERECTOMY, VAGINAL      No BSO    LITHOTRIPSY      TOTAL HIP ARTHROPLASTY Right 2020    HIP TOTAL ARTHROPLASTY ANTERIOR APPROACH performed by Eula Hdz MD at [facility] OR       Social History     Tobacco Use    Smoking status: Former Smoker     Packs/day: 1.00     Years: 30.00     Pack years: 30.00     Types: Cigarettes     Quit date: 2019     Years since quittin.8    Smokeless tobacco: Never Used   Vaping Use    Vaping Use: Never used   Substance Use Topics    Alcohol use: Not Currently    Drug use: Never       Family History   Problem Relation Age of Onset    Breast Cancer Paternal Grandmother     Hypertension Mother     High Cholesterol Mother     Hypertension Father     Other Father         GERD, nephrolithiasis    High Cholesterol Father     Diabetes type 2  Father     Coronary Art Dis Father     High Blood Pressure Sister     Other Sister         GERD       /82   Pulse 68   Temp 97.3 °F (36.3 °C) (Temporal)   Resp 16   Ht 5' 5\" (1.651 m)   Wt 220 lb 3.2 oz (99.9 kg)   SpO2 96%   BMI 36.64 kg/m²     Physical Exam  Vitals reviewed. Constitutional:       General: She is not in acute distress. Appearance: Normal appearance. She is well-developed. HENT:      Head: Normocephalic.    Eyes:      Conjunctiva/sclera: Conjunctivae normal.      Pupils: Pupils are equal, round, and reactive to light. Neck:      Thyroid: No thyromegaly. Vascular: No carotid bruit or JVD. Trachea: No tracheal deviation. Cardiovascular:      Rate and Rhythm: Normal rate and regular rhythm. Heart sounds: Normal heart sounds. No murmur heard. Pulmonary:      Effort: Pulmonary effort is normal. No respiratory distress. Breath sounds: Normal breath sounds. No wheezing or rhonchi. Musculoskeletal:         General: Normal range of motion. Cervical back: Normal range of motion and neck supple. Lymphadenopathy:      Cervical: No cervical adenopathy. Skin:     General: Skin is warm and dry. Findings: No rash. Neurological:      Mental Status: She is alert. Psychiatric:         Mood and Affect: Mood normal.         Behavior: Behavior normal.         Thought Content: Thought content normal.         ASSESSMENT/PLAN:  1. Vitamin D deficiency  -Start ergocalciferol 50,000 units weekly.  -We can recheck level when I see her back in 2 months. Discussed starting OTC 1000 to 2000 units daily when back in therapeutic level. - Vitamin D 25 Hydroxy; Future    2. Elevated parathyroid hormone  -Likely secondary to low vitamin D. Calcium is normal.  -Recheck PTH and CMP in 2 months to reassess on vitamin D therapy. - PTH, Intact; Future  - Comprehensive Metabolic Panel; Future    3. Primary insomnia  -Trazodone 50 to 100 mg nightly as needed. SE profile reviewed. -If ineffective, consider starting zolpidem prn. Return in about 2 months (around 12/12/2021) for follow up, 30 minute visit. Avelina was seen today for follow-up, discuss labs and insomnia. Diagnoses and all orders for this visit:    Vitamin D deficiency  -     Vitamin D 25 Hydroxy; Future    Elevated parathyroid hormone  -     PTH, Intact; Future  -     Comprehensive Metabolic Panel; Future    Primary insomnia    Other orders  -     vitamin D (ERGOCALCIFEROL) 1.25 MG (47066 UT) CAPS capsule;  Take 1

## 2021-10-20 ENCOUNTER — HOSPITAL ENCOUNTER (OUTPATIENT)
Dept: PAIN MANAGEMENT | Age: 51
Discharge: HOME | End: 2021-10-20
Payer: MEDICAID

## 2021-10-20 VITALS
HEART RATE: 73 BPM | OXYGEN SATURATION: 97 % | RESPIRATION RATE: 18 BRPM | SYSTOLIC BLOOD PRESSURE: 112 MMHG | TEMPERATURE: 97.1 F | DIASTOLIC BLOOD PRESSURE: 60 MMHG

## 2021-10-20 PROCEDURE — 2500000003 HC RX 250 WO HCPCS

## 2021-10-20 PROCEDURE — 20553 NJX 1/MLT TRIGGER POINTS 3/>: CPT | Performed by: NURSE PRACTITIONER

## 2021-10-20 PROCEDURE — 20553 NJX 1/MLT TRIGGER POINTS 3/>: CPT

## 2021-10-20 PROCEDURE — 6360000002 HC RX W HCPCS

## 2021-10-20 RX ORDER — KETOROLAC TROMETHAMINE 30 MG/ML
45 INJECTION, SOLUTION INTRAMUSCULAR; INTRAVENOUS ONCE
Status: DISCONTINUED | OUTPATIENT
Start: 2021-10-20 | End: 2021-10-22 | Stop reason: HOSPADM

## 2021-10-20 RX ORDER — BUPIVACAINE HYDROCHLORIDE 5 MG/ML
15 INJECTION, SOLUTION EPIDURAL; INTRACAUDAL ONCE
Status: DISCONTINUED | OUTPATIENT
Start: 2021-10-20 | End: 2021-10-22 | Stop reason: HOSPADM

## 2021-10-20 RX ORDER — LIDOCAINE HYDROCHLORIDE 10 MG/ML
15 INJECTION, SOLUTION EPIDURAL; INFILTRATION; INTRACAUDAL; PERINEURAL ONCE
Status: DISCONTINUED | OUTPATIENT
Start: 2021-10-20 | End: 2021-10-22 | Stop reason: HOSPADM

## 2021-10-20 NOTE — PROCEDURES
[facility] Physical & Pain Medicine    Patient Name: Logan Simon    : 1970                    Age: 53 y.o. Sex: female    Date: 2021    Pre-op Diagnosis: Myofascial Pain/ Muscle Spasms    Post-op Diagnosis: Myofascial Pain/ Muscle Spasms    Procedure: Thoracic Trigger Point Injections    Performing Procedure: COLLEEN Pyle - BC, VA-BC    Patient Vitals for the past 24 hrs:   BP Temp Temp src Pulse Resp SpO2   10/20/21 0836 112/60 97.1 °F (36.2 °C) Temporal 73 18 97 %       Description of Procedure:  After a brief physical assessment and failure to improve with conservative measures the patient presented for Thoracic Trigger Point Injections The indications, limitations and possible complications were discussed with the patient and the patient elected to proceed with the procedure. After voluntary, informed and signed consent obtained the patient was placed in a seated position. Appropriate time out was obtained per policy. The areas were then prepped in a sterile fashion with Chloro-Prep. The area of maximal tenderness was palpated over the bilaterally Thoracic Muscles - Erector Spinae, Upper/Mid Latissimus, Rhomboid Minor, Rhomboid Major. The skin overlying these areas was marked with a skin marker. The areas were prepped using aseptic technique with CHG prep. The skin overlying the proposed injection sites were then sprayed with Gebauer's Solution while protecting patient eyes. Each trigger point of the Thoracic Muscles - Erector Spinae, Upper/Mid Latissimus, Rhomboid Minor, Rhomboid Major was injected after negative aspiration was injected with approximately 1-2 ml of a solution of 5 ml of 1% Lidocaine Plain and 5 ml of 0.5% Marcaine Plain after negative aspiration    Toradol 0.5 ml (30mg/ml) added to each syringed.      Pre-op Diagnosis: Myofascial Pain/ Muscle Spasms    Post-op Diagnosis: Myofascial Pain/ Muscle Spasms    Procedure: Lumbar Trigger Point Injections    Performing Procedure: COLLEEN Metcalf - BC; VA-BC    Patient Vitals for the past 24 hrs:   BP Temp Temp src Pulse Resp SpO2   10/20/21 0836 112/60 97.1 °F (36.2 °C) Temporal 73 18 97 %       Description of Procedure:    After a brief physical assessment and failure to improve with conservative measures the patient presented for Lumbar Trigger Point Injections The indications, limitations and possible complications were discussed with the patient and the patient elected to proceed with the procedure. After voluntary, informed and signed consent obtained the patient was placed in a seated position. Appropriate time out was obtained per policy. The area of maximal tenderness was palpated over the bilaterally Lumbar Muscles - Lower Latissimus,  Erector Spinae, Lumbar Paraspinous. The skin overlying these areas was marked with a skin marker. The areas were prepped using aseptic technique with CHG prep. The skin overlying the proposed injection sites were then sprayed with Gebauer's Solution while protecting patient eyes. Each trigger point of the Lumbar Muscles - Lower Latissimus,  Erector Spinae, Lumbar Paraspinous was injected after negative aspiration was injected with approximately 1-2 ml of a solution of 5 ml of 1% Lidocaine Plain and 5 ml of 0.5% Marcaine Plain after negative aspiration    Toradol 0.5 ml (30mg/ml) added to each syringed. Discharge: The patient tolerated the procedure well. There were no complications during the procedure and the patient was discharged home with discharge instructions. The patient has been instructed to contact the office should there be any complications or questions to arise between today and their next appointment. Plan:     Will return to the office in 6 weeks for follow up    JAEL Messina CNP, 10/20/2021 at 9:10 AM

## 2021-10-27 ENCOUNTER — TELEPHONE (OUTPATIENT)
Dept: PAIN MANAGEMENT | Age: 51
End: 2021-10-27

## 2021-11-09 DIAGNOSIS — G89.29 CHRONIC MIDLINE THORACIC BACK PAIN: ICD-10-CM

## 2021-11-09 DIAGNOSIS — M54.6 CHRONIC MIDLINE THORACIC BACK PAIN: ICD-10-CM

## 2021-11-10 RX ORDER — HYDROCODONE BITARTRATE AND ACETAMINOPHEN 7.5; 325 MG/1; MG/1
1 TABLET ORAL EVERY 8 HOURS PRN
Qty: 90 TABLET | Refills: 0 | Status: SHIPPED | OUTPATIENT
Start: 2021-11-10 | End: 2021-12-07 | Stop reason: SDUPTHER

## 2021-12-07 DIAGNOSIS — M54.6 CHRONIC MIDLINE THORACIC BACK PAIN: ICD-10-CM

## 2021-12-07 DIAGNOSIS — G89.29 CHRONIC MIDLINE THORACIC BACK PAIN: ICD-10-CM

## 2021-12-07 RX ORDER — HYDROCODONE BITARTRATE AND ACETAMINOPHEN 7.5; 325 MG/1; MG/1
1 TABLET ORAL EVERY 8 HOURS PRN
Qty: 90 TABLET | Refills: 0 | Status: SHIPPED | OUTPATIENT
Start: 2021-12-10 | End: 2022-01-11 | Stop reason: SDUPTHER

## 2022-01-04 ENCOUNTER — OFFICE VISIT (OUTPATIENT)
Dept: FAMILY MEDICINE CLINIC | Age: 52
End: 2022-01-04
Payer: MEDICAID

## 2022-01-04 VITALS
DIASTOLIC BLOOD PRESSURE: 84 MMHG | RESPIRATION RATE: 18 BRPM | TEMPERATURE: 98.2 F | WEIGHT: 229 LBS | HEART RATE: 97 BPM | BODY MASS INDEX: 38.11 KG/M2 | OXYGEN SATURATION: 99 % | SYSTOLIC BLOOD PRESSURE: 126 MMHG

## 2022-01-04 DIAGNOSIS — F41.8 DEPRESSION WITH ANXIETY: ICD-10-CM

## 2022-01-04 DIAGNOSIS — M79.18 BILATERAL MYOFASCIAL PAIN: ICD-10-CM

## 2022-01-04 DIAGNOSIS — L40.9 PSORIASIS: Primary | ICD-10-CM

## 2022-01-04 DIAGNOSIS — E34.9 ELEVATED PARATHYROID HORMONE: ICD-10-CM

## 2022-01-04 DIAGNOSIS — E55.9 VITAMIN D DEFICIENCY: ICD-10-CM

## 2022-01-04 DIAGNOSIS — R79.89 ELEVATED PARATHYROID HORMONE: ICD-10-CM

## 2022-01-04 LAB
ALBUMIN SERPL-MCNC: 4.1 G/DL (ref 3.5–5.2)
ALP BLD-CCNC: 85 U/L (ref 35–104)
ALT SERPL-CCNC: 21 U/L (ref 5–33)
ANION GAP SERPL CALCULATED.3IONS-SCNC: 9 MMOL/L (ref 7–19)
AST SERPL-CCNC: 23 U/L (ref 5–32)
BILIRUB SERPL-MCNC: 0.3 MG/DL (ref 0.2–1.2)
BUN BLDV-MCNC: 12 MG/DL (ref 6–20)
CALCIUM SERPL-MCNC: 9.2 MG/DL (ref 8.6–10)
CHLORIDE BLD-SCNC: 102 MMOL/L (ref 98–111)
CO2: 30 MMOL/L (ref 22–29)
CREAT SERPL-MCNC: 0.6 MG/DL (ref 0.5–0.9)
GFR AFRICAN AMERICAN: >59
GFR NON-AFRICAN AMERICAN: >60
GLUCOSE BLD-MCNC: 92 MG/DL (ref 74–109)
PARATHYROID HORMONE INTACT: 45.9 PG/ML (ref 15–65)
POTASSIUM SERPL-SCNC: 4.1 MMOL/L (ref 3.5–5)
SODIUM BLD-SCNC: 141 MMOL/L (ref 136–145)
TOTAL PROTEIN: 7.1 G/DL (ref 6.6–8.7)
VITAMIN D 25-HYDROXY: 43.3 NG/ML

## 2022-01-04 PROCEDURE — 99214 OFFICE O/P EST MOD 30 MIN: CPT | Performed by: NURSE PRACTITIONER

## 2022-01-04 RX ORDER — ESCITALOPRAM OXALATE 10 MG/1
10 TABLET ORAL DAILY
Qty: 30 TABLET | Refills: 5 | Status: SHIPPED | OUTPATIENT
Start: 2022-01-04 | End: 2022-03-29

## 2022-01-04 RX ORDER — TRIAMCINOLONE ACETONIDE 5 MG/G
CREAM TOPICAL
Qty: 45 G | Refills: 2 | Status: SHIPPED | OUTPATIENT
Start: 2022-01-04 | End: 2022-01-11

## 2022-01-04 RX ORDER — TIZANIDINE 4 MG/1
TABLET ORAL
Qty: 60 TABLET | Refills: 2 | Status: SHIPPED | OUTPATIENT
Start: 2022-01-04 | End: 2022-03-14 | Stop reason: SDUPTHER

## 2022-01-04 ASSESSMENT — ENCOUNTER SYMPTOMS
COUGH: 0
ABDOMINAL PAIN: 0
DIARRHEA: 0
NAUSEA: 0
CHEST TIGHTNESS: 0
SHORTNESS OF BREATH: 0
ROS SKIN COMMENTS: PSORIASIS
SORE THROAT: 0
WHEEZING: 0

## 2022-01-04 NOTE — PROGRESS NOTES
Tasia Sanchez is a 49 y.o. female who presents today for  Chief Complaint   Patient presents with    Follow-up       HPI:  She has not had her labs drawn for f/u on low vit d, elevated PTH. She is taking weekly ergocalciferol. She also started a daily MVI. She has a hx of psoriasis/eczema. Worse recently. Now on face, typically on legs, arms. She has failed tx with betamethasone, triamcinolone 0.1%. She is using eucerin cream and bath & body works products. She has not seen dermatology locally. She has had increased depression/anxiety. Chronic insomnia but worse recently.  recently had MI. She has not taken medication in the past.      Review of Systems   HENT: Negative for congestion, ear pain and sore throat. Respiratory: Negative for cough, chest tightness, shortness of breath and wheezing. Cardiovascular: Negative for chest pain. Gastrointestinal: Negative for abdominal pain, diarrhea and nausea. Musculoskeletal: Negative for arthralgias and myalgias. Skin: Negative for rash. psoriasis   Psychiatric/Behavioral: Positive for dysphoric mood and sleep disturbance. The patient is nervous/anxious. Past Medical History:   Diagnosis Date    Abnormal Pap smear of cervix     GERD (gastroesophageal reflux disease)     Hip pain     Hyperlipidemia     Nephrolithiasis     Primary osteoarthritis of right hip 12/3/2019    Psoriasis        Current Outpatient Medications   Medication Sig Dispense Refill    Crisaborole 2 % OINT Apply twice daily to skin for eczema. 60 g 3    triamcinolone (ARISTOCORT) 0.5 % cream Apply topically 2 times daily. 45 g 2    escitalopram (LEXAPRO) 10 MG tablet Take 1 tablet by mouth daily 30 tablet 5    HYDROcodone-acetaminophen (NORCO) 7.5-325 MG per tablet Take 1 tablet by mouth every 8 hours as needed for Pain for up to 30 days.  (may fill 12/10/21) 90 tablet 0    vitamin D (ERGOCALCIFEROL) 1.25 MG (06379 UT) CAPS capsule Take 1 capsule by mouth once a week 12 capsule 1    omeprazole (PRILOSEC) 20 MG delayed release capsule Take 1 capsule by mouth every morning (before breakfast) 90 capsule 1    tiZANidine (ZANAFLEX) 4 MG tablet 1/4 tablet with meals 1/2 to whole tablet at bedtime 60 tablet 2     No current facility-administered medications for this visit. No Known Allergies    Past Surgical History:   Procedure Laterality Date    CHOLECYSTECTOMY      HYSTERECTOMY      HYSTERECTOMY, VAGINAL      No BSO    LITHOTRIPSY      TOTAL HIP ARTHROPLASTY Right 2020    HIP TOTAL ARTHROPLASTY ANTERIOR APPROACH performed by Donaldo Real MD at [facility]       Social History     Tobacco Use    Smoking status: Former Smoker     Packs/day: 1.00     Years: 30.00     Pack years: 30.00     Types: Cigarettes     Quit date: 2019     Years since quittin.0    Smokeless tobacco: Never Used   Vaping Use    Vaping Use: Never used   Substance Use Topics    Alcohol use: Not Currently    Drug use: Never       Family History   Problem Relation Age of Onset    Breast Cancer Paternal Grandmother     Hypertension Mother     High Cholesterol Mother     Hypertension Father     Other Father         GERD, nephrolithiasis    High Cholesterol Father     Diabetes type 2  Father     Coronary Art Dis Father     High Blood Pressure Sister     Other Sister         GERD       /84   Pulse 97   Temp 98.2 °F (36.8 °C) (Temporal)   Resp 18   Wt 229 lb (103.9 kg)   SpO2 99%   BMI 38.11 kg/m²     Physical Exam  Vitals reviewed. Constitutional:       General: She is not in acute distress. Appearance: Normal appearance. She is well-developed. HENT:      Head: Normocephalic. Eyes:      Conjunctiva/sclera: Conjunctivae normal.      Pupils: Pupils are equal, round, and reactive to light. Neck:      Thyroid: No thyromegaly. Vascular: No carotid bruit or JVD. Trachea: No tracheal deviation.    Cardiovascular: Rate and Rhythm: Normal rate and regular rhythm. Heart sounds: Normal heart sounds. No murmur heard. Pulmonary:      Effort: Pulmonary effort is normal. No respiratory distress. Breath sounds: Normal breath sounds. No wheezing or rhonchi. Musculoskeletal:         General: Normal range of motion. Cervical back: Normal range of motion and neck supple. Lymphadenopathy:      Cervical: No cervical adenopathy. Skin:     General: Skin is warm and dry. Findings: Rash (Patchy eczema noted to legs. Some mild patchiness to face.) present. Neurological:      Mental Status: She is alert. Psychiatric:         Mood and Affect: Mood normal.         Behavior: Behavior normal.         Thought Content: Thought content normal.         ASSESSMENT/PLAN:  1. Depression with anxiety  -Lexapro 10 mg daily. SE profile reviewed. -Declines referral for counseling.  -Reassess in 6 weeks. - escitalopram (LEXAPRO) 10 MG tablet; Take 1 tablet by mouth daily  Dispense: 30 tablet; Refill: 5    2. Psoriasis  -Eucrisa twice daily   -Will send rx for steroid cream as well, triamcinolone 0.5% BID, advised to avoid using on face.   -Refer to dermatology  - External Referral To Dermatology    3. Vitamin D deficiency  -She will get labs to day to recheck since starting weekly ergo    4. Elevated parathyroid hormone  -Lab today to reassess         Return in about 6 weeks (around 2/15/2022) for follow up, 30 minute visit. Avelina was seen today for follow-up. Diagnoses and all orders for this visit:    Psoriasis  -     External Referral To Dermatology    Depression with anxiety  -     escitalopram (LEXAPRO) 10 MG tablet; Take 1 tablet by mouth daily    Vitamin D deficiency    Elevated parathyroid hormone    Other orders  -     Crisaborole 2 % OINT; Apply twice daily to skin for eczema. -     triamcinolone (ARISTOCORT) 0.5 % cream; Apply topically 2 times daily.       Medications Discontinued During This Encounter Medication Reason    betamethasone dipropionate (DIPROLENE) 0.05 % cream LIST CLEANUP    traZODone (DESYREL) 50 MG tablet LIST CLEANUP     There are no Patient Instructions on file for this visit. Patient voicesunderstanding and agrees to plans along with risks and benefits of plan. Counseling:  Avelina Torres's case, medications and options were discussed in detail. Patient was instructed to call the office if she questionsregarding her treatment. Should her conditions worsen, she should return to office to be reassessed by JAEL Bhatt. she Should to go the closest Emergency Department for any emergency. They verbalizedunderstanding the above instructions. Return in about 6 weeks (around 2/15/2022) for follow up, 30 minute visit.

## 2022-01-10 ENCOUNTER — OFFICE VISIT (OUTPATIENT)
Dept: NEUROSURGERY | Age: 52
End: 2022-01-10
Payer: MEDICAID

## 2022-01-10 VITALS
DIASTOLIC BLOOD PRESSURE: 80 MMHG | BODY MASS INDEX: 38.11 KG/M2 | SYSTOLIC BLOOD PRESSURE: 129 MMHG | WEIGHT: 229 LBS | HEART RATE: 61 BPM

## 2022-01-10 DIAGNOSIS — M54.50 CHRONIC BILATERAL LOW BACK PAIN WITHOUT SCIATICA: Primary | ICD-10-CM

## 2022-01-10 DIAGNOSIS — G89.29 CHRONIC NECK PAIN: ICD-10-CM

## 2022-01-10 DIAGNOSIS — M54.2 CHRONIC NECK PAIN: ICD-10-CM

## 2022-01-10 DIAGNOSIS — G89.29 CHRONIC BILATERAL LOW BACK PAIN WITHOUT SCIATICA: Primary | ICD-10-CM

## 2022-01-10 DIAGNOSIS — M50.30 DEGENERATIVE DISC DISEASE, CERVICAL: ICD-10-CM

## 2022-01-10 PROCEDURE — 99213 OFFICE O/P EST LOW 20 MIN: CPT | Performed by: NEUROLOGICAL SURGERY

## 2022-01-10 NOTE — PROGRESS NOTES
NEUROSURGERY FOLLOW UP      Chief Complaint:   Chief Complaint   Patient presents with    Follow-up     back pain. HPI:   6m follow up visit to re-assess chronic neck and back pain. Today, she complains of more paraspinal mid-back pain than at our last visit. She continues to endorse mechanical lower back pain as well as neck pain. She has some intermittent numbness and clumsiness in her hands but no clear radicular pain, sensory loss or weakness. ROS:    Constitutional: Negative. HENT: Negative. Eyes: Negative. Respiratory: Negative. Cardiovascular: Negative. Gastrointestinal: Negative. Genitourinary: Negative. Musculoskeletal: Positive for back pain, joint pain and neck pain. Skin: Negative. Neurological: Negative. Endo/Heme/Allergies: Negative. Psychiatric/Behavioral: Negative.         Review of systems was obtained by the medical assistant and reviewed by myself. Objective:    /80   Pulse 61   Wt 229 lb (103.9 kg)   BMI 38.11 kg/m²         Physical Exam:    General: alert, cooperative, no distress  Cardiorespiratory: unlabored breathing      Neurologic Exam:    Mental Status: Alert, oriented, thought content appropriate  Cranial Nerves: PERRL, EOMI, symmetric facies, tongue midline  Motor: Motor exam is symmetrical 5 out of 5 all extremities bilaterally  Somatosensory: normal light touch sensation  Reflexes: 1+ at biceps and patella bilaterally, Rivas's absent bilaterally  Gait: Mildly antalgic          Assessment and Plan:  52 y/o returns for follow up of chronic back and neck pain. Her symptoms are stable and at our last visit I advised her that I did not appreciate any pathology on her cervical or lumbar MRI scans that would be amenable to surgery. Her neurologic exam is normal.  She is established with pain management and I recommended that she continue to follow up with them for assistance in managing her chronic pain.   She may follow up with me on an as-needed basis with further issues or concerns.       Electronically signed by Valeria Blood MD on 1/10/2022 at 10:48 AM

## 2022-01-11 DIAGNOSIS — G89.29 CHRONIC MIDLINE THORACIC BACK PAIN: ICD-10-CM

## 2022-01-11 DIAGNOSIS — M54.6 CHRONIC MIDLINE THORACIC BACK PAIN: ICD-10-CM

## 2022-01-12 RX ORDER — HYDROCODONE BITARTRATE AND ACETAMINOPHEN 7.5; 325 MG/1; MG/1
1 TABLET ORAL EVERY 8 HOURS PRN
Qty: 90 TABLET | Refills: 0 | Status: SHIPPED | OUTPATIENT
Start: 2022-01-13 | End: 2022-02-21 | Stop reason: SDUPTHER

## 2022-02-14 ENCOUNTER — TELEPHONE (OUTPATIENT)
Dept: FAMILY MEDICINE CLINIC | Age: 52
End: 2022-02-14

## 2022-02-14 NOTE — TELEPHONE ENCOUNTER
----- Message from Dana Ott sent at 2/14/2022 10:00 AM CST -----  Subject: Message to Provider    QUESTIONS  Information for Provider? she needs her appt from 2-15 rescheduled & I   tried to do it but it did not keep the 30 minute slot needed. Please   contact Avelina to reschedule herself & . They have Covid today so   need to change.  ---------------------------------------------------------------------------  --------------  CALL BACK INFO  What is the best way for the office to contact you? OK to leave message on   voicemail  Preferred Call Back Phone Number? [phone]  ---------------------------------------------------------------------------  --------------  SCRIPT ANSWERS  Relationship to Patient?  Self

## 2022-02-21 DIAGNOSIS — G89.29 CHRONIC MIDLINE THORACIC BACK PAIN: ICD-10-CM

## 2022-02-21 DIAGNOSIS — M54.6 CHRONIC MIDLINE THORACIC BACK PAIN: ICD-10-CM

## 2022-02-21 NOTE — TELEPHONE ENCOUNTER
Patient called on 2/18/22. The office is closed on Fridays. Patient cancelled appt on 12/27/22, office cancelled appt on 12/14/22. Rescheduled for 3/14/22 with Jonas Lord. Sending enough until that appointment.

## 2022-02-23 RX ORDER — HYDROCODONE BITARTRATE AND ACETAMINOPHEN 7.5; 325 MG/1; MG/1
1 TABLET ORAL EVERY 8 HOURS PRN
Qty: 33 TABLET | Refills: 0 | Status: SHIPPED | OUTPATIENT
Start: 2022-02-23 | End: 2022-03-06

## 2022-03-14 ENCOUNTER — HOSPITAL ENCOUNTER (OUTPATIENT)
Dept: PAIN MANAGEMENT | Age: 52
Discharge: HOME | End: 2022-03-14
Payer: MEDICAID

## 2022-03-14 VITALS
DIASTOLIC BLOOD PRESSURE: 83 MMHG | WEIGHT: 227 LBS | HEIGHT: 64 IN | RESPIRATION RATE: 16 BRPM | BODY MASS INDEX: 38.76 KG/M2 | HEART RATE: 95 BPM | OXYGEN SATURATION: 93 % | TEMPERATURE: 98.2 F | SYSTOLIC BLOOD PRESSURE: 135 MMHG

## 2022-03-14 DIAGNOSIS — M79.18 BILATERAL MYOFASCIAL PAIN: ICD-10-CM

## 2022-03-14 DIAGNOSIS — M54.50 CHRONIC BILATERAL LOW BACK PAIN WITHOUT SCIATICA: Primary | ICD-10-CM

## 2022-03-14 DIAGNOSIS — G89.29 CHRONIC BILATERAL LOW BACK PAIN WITHOUT SCIATICA: Primary | ICD-10-CM

## 2022-03-14 PROCEDURE — 99214 OFFICE O/P EST MOD 30 MIN: CPT | Performed by: NURSE PRACTITIONER

## 2022-03-14 PROCEDURE — 99215 OFFICE O/P EST HI 40 MIN: CPT

## 2022-03-14 RX ORDER — TIZANIDINE 4 MG/1
TABLET ORAL
Qty: 60 TABLET | Refills: 2 | Status: SHIPPED | OUTPATIENT
Start: 2022-03-14 | End: 2022-07-06 | Stop reason: SDUPTHER

## 2022-03-14 RX ORDER — HYDROCODONE BITARTRATE AND ACETAMINOPHEN 7.5; 325 MG/1; MG/1
1 TABLET ORAL EVERY 8 HOURS PRN
Qty: 90 TABLET | Refills: 0 | Status: SHIPPED | OUTPATIENT
Start: 2022-03-14 | End: 2022-04-13 | Stop reason: SDUPTHER

## 2022-03-14 RX ORDER — HYDROCODONE BITARTRATE AND ACETAMINOPHEN 7.5; 325 MG/1; MG/1
1 TABLET ORAL 2 TIMES DAILY PRN
COMMUNITY
End: 2022-03-14 | Stop reason: SDUPTHER

## 2022-03-14 RX ORDER — M-VIT,TX,IRON,MINS/CALC/FOLIC 27MG-0.4MG
1 TABLET ORAL DAILY
COMMUNITY

## 2022-03-14 ASSESSMENT — PAIN DESCRIPTION - INTENSITY: RATING_2: 10

## 2022-03-14 ASSESSMENT — PAIN DESCRIPTION - LOCATION
LOCATION: NECK
LOCATION_2: BACK

## 2022-03-14 ASSESSMENT — PAIN DESCRIPTION - PAIN TYPE
TYPE_2: CHRONIC PAIN
TYPE: CHRONIC PAIN

## 2022-03-14 ASSESSMENT — PAIN DESCRIPTION - ORIENTATION
ORIENTATION_2: LOWER
ORIENTATION: LOWER

## 2022-03-14 ASSESSMENT — ENCOUNTER SYMPTOMS
BACK PAIN: 1
CONSTIPATION: 0

## 2022-03-14 ASSESSMENT — PAIN SCALES - GENERAL: PAINLEVEL_OUTOF10: 8

## 2022-03-14 NOTE — PROGRESS NOTES
Clinic Documentation      Education Provided:  [x] Review of Patrecia   [x] Agreement Review  [x] PEG Score Calculated [x] PHQ Score Calculated [x] ORT Score Calculated    [] Compliance Issues Discussed [] Cognitive Behavior Needs [x] Exercise [] Review of Test [] Financial Issues  [x] Tobacco/Alcohol Use Reviewed [x] Teaching [] New Patient [] Picture Obtained    Physician Plan:  [] Outgoing Referral  [] Pharmacy Consult  [] Test Ordered [x] Prescription Ordered/Changed   [] Obtained Test Results / Consult Notes        Complete if patient is withholding blood thinner for procedure     Blood Thinner Patient is currently taking:      [] Plavix (Hold for 7 days)  [] Aspirin (Hold for 5 days)     [] Pletal (Hold for 2 days)  [] Pradaxa (Hold for 3 days)    [] Effient (Hold for 7 days)  [] Xarelto (Hold for 2 days)    [] Eliquis (Hold for 2 days)  [] Brilinta (Hold for 7 days)    [] Coumadin (Hold for 5 days) - (INR needs to be drawn the day prior to procedure- INR < 2.0)    [] Aggrenox (Hold for 7 days)        [] Patient will stop medication on their own.    [] Blood Thinner Form Faxed for approval to hold.    Provider form faxed to:     Assessment Completed by:  Electronically signed by Emily Urias RN on 3/14/2022 at 2:53 PM

## 2022-03-14 NOTE — PROGRESS NOTES
[facility] Physical & Pain Medicine  Office Note    Patient Name: Klaus Salazar    MR #: [id number]    Account #: [de-identified]    : 1970    Age: 49 y.o. Sex: female    Date: 3/14/2022    PCP: JAEL Mena    Chief Complaint:   Chief Complaint   Patient presents with    Neck Pain    Lower Back Pain       History of Present Illness:     Klaus Salazar is a 49 y.o. female who     Neck Pain   This is a chronic problem. The current episode started more than 1 year ago. The problem occurs constantly. The problem has been gradually worsening. Pain location: pain goes down neck right shoulder fells like hot water running down her arm. The quality of the pain is described as aching, cramping and shooting. The symptoms are aggravated by bending and twisting (one position for any length of time). Associated symptoms include numbness. Back Pain  This is a chronic problem. The current episode started more than 1 year ago. The problem occurs constantly. The problem has been gradually worsening since onset. The pain is present in the lumbar spine, sacro-iliac and thoracic spine. The quality of the pain is described as stabbing, shooting, cramping, burning and aching. Radiates to: into bilateral buttocks more to left. The symptoms are aggravated by sitting and standing. Associated symptoms include numbness. Screening Tools:    PEG: 10    Past PEG: 10    ORT: 1    PHQ-9: 12    Current Pain Assessment  Pain Assessment  Pain Assessment: 0-10  Pain Level: 8  Pain Type: Chronic pain  Pain Location: Neck  Pain Orientation: Lower  Multiple Pain Sites: Yes    Past Visit HPI:  2021  Patient has had back pain for years. She does not recall any specific injury. Patient states that pain will shoot down her buttocks into her legs at different times. Patient states that if she stands for long periods of time that her back will start to hurt and she feels like she needs to roll forward. Patient has not completed any physical therapy. Patient states that she has a hard time getting comfortable in any position. Patient states that in the afternoon she likes to lie down to flatten her back she feels better at first but she cannot get comfortable. Patient states that she has 100 % low back pain. Patient had hip replacement in 2020. Patient cannot do a lot of lifting, patient does not mop or sweep any longer (family does it) patient can't lift the laundry basket.      On 2020Xray of C Spine indicated degenerative changes with loss of cervical lordosis, Thoracic and Lumbar spine with chronic degenerative changes. MRI has been ordered by neurosurgery. Past Medical History  Past Medical History:   Diagnosis Date    Abnormal Pap smear of cervix     GERD (gastroesophageal reflux disease)     Hip pain     Hyperlipidemia     Nephrolithiasis     Primary osteoarthritis of right hip 12/3/2019    Psoriasis        Surgery History  Past Surgical History:   Procedure Laterality Date    CHOLECYSTECTOMY      HYSTERECTOMY      HYSTERECTOMY, VAGINAL      No BSO    LITHOTRIPSY      TOTAL HIP ARTHROPLASTY Right 2020    HIP TOTAL ARTHROPLASTY ANTERIOR APPROACH performed by Adelina Orr MD at [address] History  family history includes Breast Cancer in her paternal grandmother; Coronary Art Dis in her father; Diabetes type 2  in her father; High Blood Pressure in her sister; High Cholesterol in her father and mother; Hypertension in her father and mother; Other in her father and sister. Social History    Social History     Tobacco Use    Smoking status: Former Smoker     Packs/day: 1.00     Years: 30.00     Pack years: 30.00     Types: Cigarettes     Quit date: 2019     Years since quittin.2    Smokeless tobacco: Never Used   Substance Use Topics    Alcohol use: Not Currently       Allergies  Patient has no known allergies.      Current Medications  Current Outpatient Medications   Medication Sig Dispense Refill    HYDROcodone-acetaminophen (NORCO) 7.5-325 MG per tablet Take 1 tablet by mouth 2 times daily as needed for Pain.  Multiple Vitamins-Minerals (THERAPEUTIC MULTIVITAMIN-MINERALS) tablet Take 1 tablet by mouth daily      Crisaborole 2 % OINT Apply twice daily to skin for eczema. 60 g 3    escitalopram (LEXAPRO) 10 MG tablet Take 1 tablet by mouth daily 30 tablet 5    tiZANidine (ZANAFLEX) 4 MG tablet 1/4 tablet with meals 1/2 to whole tablet at bedtime 60 tablet 2    omeprazole (PRILOSEC) 20 MG delayed release capsule Take 1 capsule by mouth every morning (before breakfast) 90 capsule 1     No current facility-administered medications for this encounter. Review of Systems:  Review of Systems   Constitutional: Positive for activity change. Gastrointestinal: Negative for constipation. Musculoskeletal: Positive for arthralgias, back pain, myalgias, neck pain and neck stiffness. Neurological: Positive for numbness. Psychiatric/Behavioral: Positive for sleep disturbance. Negative for agitation, self-injury and suicidal ideas. The patient is not nervous/anxious. 14 point ROS negative besides that noted in HPI    Physical Exam:    Vitals:    03/14/22 1413   BP: 135/83   Pulse: 95   Resp: 16   Temp: 98.2 °F (36.8 °C)   TempSrc: Temporal   SpO2: 93%   Weight: 227 lb (103 kg)   Height: 5' 4\" (1.626 m)       Body mass index is 38.96 kg/m². Physical Exam  Vitals and nursing note reviewed. Constitutional:       General: She is not in acute distress. Appearance: She is well-developed. HENT:      Head: Normocephalic. Right Ear: External ear normal.      Left Ear: External ear normal.      Nose: Nose normal.   Eyes:      Conjunctiva/sclera: Conjunctivae normal.      Pupils: Pupils are equal, round, and reactive to light. Neck:      Vascular: No JVD. Trachea: No tracheal deviation.    Cardiovascular:      Rate and Rhythm: Normal rate. Pulmonary:      Effort: Pulmonary effort is normal.   Abdominal:      General: There is no distension. Tenderness: There is no abdominal tenderness. Musculoskeletal:      Cervical back: Spasms (tender palpable knots i.e. trigger points identified) and tenderness present. Pain with movement present. Thoracic back: Spasms (tender palpable knots i.e. trigger points identified) and bony tenderness present. Lumbar back: Spasms (tender palpable knots i.e. trigger points identified) and tenderness present. Negative right straight leg raise test.      Comments: Patient points to on left SI joint as the source of low back pain  TTP of SI joint on left  positive Jose's on left, positive Distraction on left, positive Thigh Thrust on left     Skin:     General: Skin is warm and dry. Neurological:      Mental Status: She is alert and oriented to person, place, and time. Cranial Nerves: No cranial nerve deficit. Psychiatric:         Behavior: Behavior normal.         Thought Content: Thought content normal.         Judgment: Judgment normal.         Labs:  Lab Results   Component Value Date     01/04/2022    K 4.1 01/04/2022    K 3.6 02/29/2020     01/04/2022    CO2 30 01/04/2022    BUN 12 01/04/2022    CREATININE 0.6 01/04/2022    GLUCOSE 92 01/04/2022    CALCIUM 9.2 01/04/2022        Lab Results   Component Value Date    WBC 5.9 10/05/2021    HGB 13.7 10/05/2021    HCT 45.1 10/05/2021    MCV 97.0 10/05/2021     10/05/2021       Assessment:  Active Problems:    Chronic bilateral low back pain without sciatica    Chronic neck pain    Degenerative disc disease, cervical    Degenerative disc disease, lumbar    Degenerative disc disease, thoracic    Pain medication agreement    Sacroiliac joint dysfunction of left side    Cervical paraspinal muscle spasm    Back muscle spasm    Numbness and tingling in both hands  Resolved Problems:    * No resolved hospital problems. *      Plan:  Chronic bilateral low back pain without sciatica  Continue with refill sent for fill date of 10/8/2021.- HYDROcodone-acetaminophen (NORCO) 7.5-325 MG per tablet; Take 1 tablet by mouth every 8 hours as needed for Pain for up to 30 days. Intended supply: 30 days  Dispense: 90 tablet; Refill: 0       Cervical paraspinal muscle spasm  Back muscle spasm  Continue Zanaflex gradual taper. Schedule bilateral thoracic and lumbar trigger point with Toradol injections due to myofascial pain. SI joint dysfunction  Left SI with US with NP as patient has had good relief in the past    Continue nexwave device. [x] Follow up    [] 4 weeks   [x] 6-8 weeks   [] 10-12 weeks   [] 3 months  [x] Post procedure to evaluate effectiveness of treatment  [] To evaluate medications changes made at office visit. [] To review diagnostics ordered at last visit. [] To evaluate response to therapy    [x] For management of controlled substance  [x] Random UDS as indicated by ORT score or if indicated by abberent behaviors    Discussion: Discussed exam findings and plan of care with patient. Patient agreed with POC and questions were asked and answered. Activity: Discussed exercise as beneficial to pain reduction, encouraged daily stretching with a focus on torso strengthening. Goal:  Pain Management Goals of Therapy:   [] Resolution in pain  [x] Decrease in pain level  [x] Improvement in ADL's  [x] Increase in activities with less pain  [] Decrease in medication      Controlled substance monitoring:    [x] Discussed medication side effects, risk of tolerance and/or dependence, and/or alternative treatment  [] Discussed the detrimental effects of long term narcotic use in younger patients especially women of childbearing years.   [x] No signs and symptoms of potential drug abuse or diversion were identified  [] Signs of potential drug abuse or diversion were identified   [] ORT Score   [] UDS non-compliant   [] See Note  [x] Random urine drug screen sent today  [x] Random urine drug screen not completed today   [] Deferred New Patient  [] Compliant  3/1/2021  [] Not Compliant see note  [x] Medication agreement with provider signed today 3/14/2022  [] Medication agreement with provider on file under media   [] Medication regimen effective with no c/o side effects and continued   [] New patient continuing current medication while developing POC   [x] On going assessment and evaluation of medication regimen  [] Medication regimen not effective see plan for changes  [x] Donna Jasmine reviewed & on file under media       CC:  JAEL Yoo, JAEL - CNP, 3/14/2022 at 2:34 PM    EMR dragon/transcription disclaimer: Much of this encounter note is electronic transcription/translation of spoken language to printed tach. Electronic translation of spoken language may be erroneous, or at times, nonsensical words or phrases may be inadvertently transcribed.  Although, I have reviewed the note for such errors, some may still exist.

## 2022-03-14 NOTE — PROGRESS NOTES
Pain agreement contract reviewed with patient and signed per patient. No questions voiced at the present and patient states understanding of agreement. Copy given to patient.

## 2022-03-14 NOTE — TELEPHONE ENCOUNTER
Requested Prescriptions     Signed Prescriptions Disp Refills    tiZANidine (ZANAFLEX) 4 MG tablet 60 tablet 2     Si/4 tablet with meals 1/2 to whole tablet at bedtime     Authorizing Provider: Belem Pink HYDROcodone-acetaminophen (NORCO) 7.5-325 MG per tablet 90 tablet 0     Sig: Take 1 tablet by mouth every 8 hours as needed for Pain for up to 30 days.      Authorizing Provider: Luz Ocampo

## 2022-03-28 RX ORDER — ERGOCALCIFEROL 1.25 MG/1
CAPSULE ORAL
Qty: 12 CAPSULE | Refills: 1 | Status: ON HOLD | OUTPATIENT
Start: 2022-03-28 | End: 2022-05-12

## 2022-03-29 ENCOUNTER — OFFICE VISIT (OUTPATIENT)
Dept: FAMILY MEDICINE CLINIC | Age: 52
End: 2022-03-29
Payer: MEDICAID

## 2022-03-29 VITALS
WEIGHT: 224 LBS | RESPIRATION RATE: 18 BRPM | BODY MASS INDEX: 38.45 KG/M2 | SYSTOLIC BLOOD PRESSURE: 126 MMHG | HEART RATE: 115 BPM | DIASTOLIC BLOOD PRESSURE: 80 MMHG | OXYGEN SATURATION: 95 % | TEMPERATURE: 97.6 F

## 2022-03-29 DIAGNOSIS — E55.9 VITAMIN D DEFICIENCY: Primary | ICD-10-CM

## 2022-03-29 DIAGNOSIS — G89.29 CHRONIC MIDLINE THORACIC BACK PAIN: ICD-10-CM

## 2022-03-29 DIAGNOSIS — M54.2 CHRONIC NECK PAIN: ICD-10-CM

## 2022-03-29 DIAGNOSIS — M54.50 CHRONIC BILATERAL LOW BACK PAIN WITHOUT SCIATICA: ICD-10-CM

## 2022-03-29 DIAGNOSIS — M54.6 CHRONIC MIDLINE THORACIC BACK PAIN: ICD-10-CM

## 2022-03-29 DIAGNOSIS — G89.29 CHRONIC NECK PAIN: ICD-10-CM

## 2022-03-29 DIAGNOSIS — G89.29 CHRONIC BILATERAL LOW BACK PAIN WITHOUT SCIATICA: ICD-10-CM

## 2022-03-29 DIAGNOSIS — L40.9 PSORIASIS: ICD-10-CM

## 2022-03-29 DIAGNOSIS — E78.49 FAMILIAL HYPERLIPIDEMIA: ICD-10-CM

## 2022-03-29 DIAGNOSIS — K21.9 GASTROESOPHAGEAL REFLUX DISEASE WITHOUT ESOPHAGITIS: ICD-10-CM

## 2022-03-29 DIAGNOSIS — E34.9 ELEVATED PARATHYROID HORMONE: ICD-10-CM

## 2022-03-29 DIAGNOSIS — F41.8 DEPRESSION WITH ANXIETY: ICD-10-CM

## 2022-03-29 PROCEDURE — 99214 OFFICE O/P EST MOD 30 MIN: CPT | Performed by: NURSE PRACTITIONER

## 2022-03-29 RX ORDER — OMEPRAZOLE 20 MG/1
20 CAPSULE, DELAYED RELEASE ORAL
Qty: 90 CAPSULE | Refills: 2 | Status: SHIPPED | OUTPATIENT
Start: 2022-03-29

## 2022-03-29 RX ORDER — CLOBETASOL PROPIONATE 0.5 MG/ML
LOTION TOPICAL
Qty: 118 ML | Refills: 0 | Status: SHIPPED | OUTPATIENT
Start: 2022-03-29

## 2022-03-29 RX ORDER — DULOXETIN HYDROCHLORIDE 30 MG/1
30 CAPSULE, DELAYED RELEASE ORAL DAILY
Qty: 90 CAPSULE | Refills: 2 | Status: SHIPPED | OUTPATIENT
Start: 2022-03-29 | End: 2022-05-18 | Stop reason: SDUPTHER

## 2022-03-29 ASSESSMENT — ENCOUNTER SYMPTOMS
WHEEZING: 0
COUGH: 0
ROS SKIN COMMENTS: PSORIASIS
CHEST TIGHTNESS: 0
SORE THROAT: 0
NAUSEA: 0
ABDOMINAL PAIN: 0
DIARRHEA: 0
SHORTNESS OF BREATH: 0

## 2022-03-29 NOTE — PATIENT INSTRUCTIONS
-Start cymbalta (duloxetine) once daily  -At the same time decrease the lexapro to 1/2 tablet daily x 7 days, then every other day x 7 days, then stop taking.    -Also start otc vit D3 1000 units daily

## 2022-03-29 NOTE — PROGRESS NOTES
Wicho Nunez is a 49 y.o. female who presents today for  Chief Complaint   Patient presents with    Follow-up       HPI:  She is tolerating Lexapro well. She is not as tearful. She still has some anxiety. She will be seeing Dr. Lise Donahue next week for left hip pain. She has chronic back and neck pain followed by pain management. She continues to have significant issues with this. She is getting injections. She is taking tizanidine and Advil. She takes hydrocodone 3 times daily managed by pain management. She continues have significant issues with psoriasis which is a chronic issue. She has large plaques to her legs. She is developing lesions on her face. Insurance did not cover [place]. She is using steroid cream with minimal improvement. Did not receive derm referral or not scheduled. Hyperlipidemia  Diet controlled. Attempting to reduce processed sugar and cholesterol from diet. Hx of vitamin D deficiency, elevated PTH. PTH normalized after vitamin D treatment. She completed weekly ergo. She was to start OTC but misunderstood and is not taking anything at this time. GERD stable on PPI. Review of Systems   Constitutional: Negative for chills and fever. HENT: Negative for congestion, ear pain and sore throat. Respiratory: Negative for cough, chest tightness, shortness of breath and wheezing. Cardiovascular: Negative for chest pain. Gastrointestinal: Negative for abdominal pain, diarrhea and nausea. Musculoskeletal: Negative for arthralgias and myalgias. Skin: Negative for rash. psoriasis   Psychiatric/Behavioral: The patient is nervous/anxious.         Past Medical History:   Diagnosis Date    Abnormal Pap smear of cervix     GERD (gastroesophageal reflux disease)     Hip pain     Hyperlipidemia     Nephrolithiasis     Primary osteoarthritis of right hip 12/3/2019    Psoriasis        Current Outpatient Medications   Medication Sig Dispense Refill  omeprazole (PRILOSEC) 20 MG delayed release capsule Take 1 capsule by mouth every morning (before breakfast) 90 capsule 2    DULoxetine (CYMBALTA) 30 MG extended release capsule Take 1 capsule by mouth daily 90 capsule 2    clobetasol propionate 0.05 % LOTN lotion Apply to affected area twice daily for psoriasis. Avoid use on face. 118 mL 0    vitamin D (ERGOCALCIFEROL) 1.25 MG (65890 UT) CAPS capsule TAKE 1 CAPSULE BY MOUTH ONCE WEEKLY 12 capsule 1    Multiple Vitamins-Minerals (THERAPEUTIC MULTIVITAMIN-MINERALS) tablet Take 1 tablet by mouth daily      tiZANidine (ZANAFLEX) 4 MG tablet 1/4 tablet with meals 1/2 to whole tablet at bedtime 60 tablet 2    HYDROcodone-acetaminophen (NORCO) 7.5-325 MG per tablet Take 1 tablet by mouth every 8 hours as needed for Pain for up to 30 days. 90 tablet 0     No current facility-administered medications for this visit.        No Known Allergies    Past Surgical History:   Procedure Laterality Date    CHOLECYSTECTOMY      HYSTERECTOMY      HYSTERECTOMY, VAGINAL      No BSO    LITHOTRIPSY      TOTAL HIP ARTHROPLASTY Right 2020    HIP TOTAL ARTHROPLASTY ANTERIOR APPROACH performed by Onnie Soulier, MD at [facility] OR       Social History     Tobacco Use    Smoking status: Former Smoker     Packs/day: 1.00     Years: 30.00     Pack years: 30.00     Types: Cigarettes     Quit date: 2019     Years since quittin.3    Smokeless tobacco: Never Used   Vaping Use    Vaping Use: Never used   Substance Use Topics    Alcohol use: Not Currently    Drug use: Never       Family History   Problem Relation Age of Onset    Breast Cancer Paternal Grandmother     Hypertension Mother     High Cholesterol Mother     Hypertension Father     Other Father         GERD, nephrolithiasis    High Cholesterol Father     Diabetes type 2  Father     Coronary Art Dis Father     High Blood Pressure Sister     Other Sister         GERD       /80   Pulse 115 Temp 97.6 °F (36.4 °C) (Temporal)   Resp 18   Wt 224 lb (101.6 kg)   SpO2 95%   BMI 38.45 kg/m²     Physical Exam  Vitals reviewed. Constitutional:       General: She is not in acute distress. Appearance: Normal appearance. She is well-developed. HENT:      Head: Normocephalic. Eyes:      Conjunctiva/sclera: Conjunctivae normal.      Pupils: Pupils are equal, round, and reactive to light. Neck:      Thyroid: No thyromegaly. Vascular: No carotid bruit or JVD. Trachea: No tracheal deviation. Cardiovascular:      Rate and Rhythm: Normal rate and regular rhythm. Heart sounds: Normal heart sounds. No murmur heard. Pulmonary:      Effort: Pulmonary effort is normal. No respiratory distress. Breath sounds: Normal breath sounds. No wheezing or rhonchi. Musculoskeletal:         General: Normal range of motion. Cervical back: Normal range of motion and neck supple. Lymphadenopathy:      Cervical: No cervical adenopathy. Skin:     General: Skin is warm and dry. Findings: No rash. Neurological:      Mental Status: She is alert. Psychiatric:         Mood and Affect: Mood normal.         Behavior: Behavior normal.         Thought Content: Thought content normal.         ASSESSMENT/PLAN:  1. Depression with anxiety  -Switch to duloxetine 30 mg daily due to increased anxiety and chronic pain.    -She will cross taper off lexapro 1/2 tab daily x7 days then every other day x7 days then stop.  -Reassess in 6 weeks, consider increasing dose if ineffective. 2. Familial hyperlipidemia  -Continue low-fat diet, work on weight loss, exercise.  -Check fasting CMP, lipid prior to next appointment. - Lipid Panel; Future  - Comprehensive Metabolic Panel; Future  - CBC with Auto Differential; Future    3. Psoriasis  -Discussed referral to dermatology. No providers in [place] are excepting her insurance at this time.   Discussed referral to [facility] which would be the closest provider. She is not interested in driving at this time.  -I will send in clobetasol 0.05% lotion twice daily for the areas on her legs and arms. Avoid use on face. 4. Vitamin D deficiency  -Advised to start OTC vitamin D3 1000 units daily  -Check vitamin D with lab  - Vitamin D 25 Hydroxy; Future    5. Elevated parathyroid hormone  -Secondary to vitamin D deficiency. Had normalized with replacement of vitamin D. Check PTH with lab. - PTH, Intact; Future    6. Gastroesophageal reflux disease without esophagitis  -Stable, continue PPI at current dose. 7. Chronic neck pain  -Duloxetine as above.  -Continue management per pain management    8. Chronic midline thoracic back pain  -As above    9. Chronic bilateral low back pain without sciatica  -As above         Return in about 6 weeks (around 5/10/2022) for follow up, 30 minute visit. Avelina was seen today for follow-up. Diagnoses and all orders for this visit:    Vitamin D deficiency  -     Vitamin D 25 Hydroxy; Future    Depression with anxiety    Familial hyperlipidemia  -     Lipid Panel; Future  -     Comprehensive Metabolic Panel; Future  -     CBC with Auto Differential; Future    Psoriasis    Elevated parathyroid hormone  -     PTH, Intact; Future    Gastroesophageal reflux disease without esophagitis    Chronic neck pain    Chronic midline thoracic back pain    Chronic bilateral low back pain without sciatica    Other orders  -     omeprazole (PRILOSEC) 20 MG delayed release capsule; Take 1 capsule by mouth every morning (before breakfast)  -     DULoxetine (CYMBALTA) 30 MG extended release capsule; Take 1 capsule by mouth daily  -     clobetasol propionate 0.05 % LOTN lotion; Apply to affected area twice daily for psoriasis. Avoid use on face.       Medications Discontinued During This Encounter   Medication Reason    escitalopram (LEXAPRO) 10 MG tablet     omeprazole (PRILOSEC) 20 MG delayed release capsule REORDER    Crisaborole 2 % OINT Cost of medication     Patient Instructions   -Start cymbalta (duloxetine) once daily  -At the same time decrease the lexapro to 1/2 tablet daily x 7 days, then every other day x 7 days, then stop taking.    -Also start otc vit D3 1000 units daily      Patient voicesunderstanding and agrees to plans along with risks and benefits of plan. Counseling:  Avelina Torres's case, medications and options were discussed in detail. Patient was instructed to call the office if she questionsregarding her treatment. Should her conditions worsen, she should return to office to be reassessed by JAEL Lim. she Should to go the closest Emergency Department for any emergency. They verbalizedunderstanding the above instructions. Return in about 6 weeks (around 5/10/2022) for follow up, 30 minute visit.

## 2022-04-07 ENCOUNTER — TELEPHONE (OUTPATIENT)
Dept: PAIN MANAGEMENT | Age: 52
End: 2022-04-07

## 2022-04-13 DIAGNOSIS — M54.50 CHRONIC BILATERAL LOW BACK PAIN WITHOUT SCIATICA: ICD-10-CM

## 2022-04-13 DIAGNOSIS — G89.29 CHRONIC BILATERAL LOW BACK PAIN WITHOUT SCIATICA: ICD-10-CM

## 2022-04-14 RX ORDER — HYDROCODONE BITARTRATE AND ACETAMINOPHEN 7.5; 325 MG/1; MG/1
1 TABLET ORAL EVERY 8 HOURS PRN
Qty: 90 TABLET | Refills: 0 | Status: SHIPPED | OUTPATIENT
Start: 2022-04-14 | End: 2022-05-09 | Stop reason: SDUPTHER

## 2022-04-20 ENCOUNTER — HOSPITAL ENCOUNTER (OUTPATIENT)
Dept: PAIN MANAGEMENT | Age: 52
Discharge: HOME | End: 2022-04-20
Payer: MEDICAID

## 2022-04-20 VITALS
RESPIRATION RATE: 18 BRPM | SYSTOLIC BLOOD PRESSURE: 104 MMHG | HEART RATE: 82 BPM | TEMPERATURE: 97.5 F | OXYGEN SATURATION: 94 % | DIASTOLIC BLOOD PRESSURE: 75 MMHG

## 2022-04-20 PROCEDURE — 20611 DRAIN/INJ JOINT/BURSA W/US: CPT

## 2022-04-20 PROCEDURE — 6360000002 HC RX W HCPCS

## 2022-04-20 PROCEDURE — 2500000003 HC RX 250 WO HCPCS

## 2022-04-20 PROCEDURE — 20553 NJX 1/MLT TRIGGER POINTS 3/>: CPT

## 2022-04-20 PROCEDURE — 76942 ECHO GUIDE FOR BIOPSY: CPT | Performed by: NURSE PRACTITIONER

## 2022-04-20 PROCEDURE — 20553 NJX 1/MLT TRIGGER POINTS 3/>: CPT | Performed by: NURSE PRACTITIONER

## 2022-04-20 RX ORDER — TRIAMCINOLONE ACETONIDE 40 MG/ML
40 INJECTION, SUSPENSION INTRA-ARTICULAR; INTRAMUSCULAR ONCE
Status: DISCONTINUED | OUTPATIENT
Start: 2022-04-20 | End: 2022-04-22 | Stop reason: HOSPADM

## 2022-04-20 RX ORDER — LIDOCAINE HYDROCHLORIDE 10 MG/ML
2 INJECTION, SOLUTION EPIDURAL; INFILTRATION; INTRACAUDAL; PERINEURAL ONCE
Status: DISCONTINUED | OUTPATIENT
Start: 2022-04-20 | End: 2022-04-22 | Stop reason: HOSPADM

## 2022-04-20 RX ORDER — LIDOCAINE HYDROCHLORIDE 10 MG/ML
15 INJECTION, SOLUTION EPIDURAL; INFILTRATION; INTRACAUDAL; PERINEURAL ONCE
Status: DISCONTINUED | OUTPATIENT
Start: 2022-04-20 | End: 2022-04-22 | Stop reason: HOSPADM

## 2022-04-20 RX ORDER — BUPIVACAINE HYDROCHLORIDE 5 MG/ML
2 INJECTION, SOLUTION EPIDURAL; INTRACAUDAL ONCE
Status: DISCONTINUED | OUTPATIENT
Start: 2022-04-20 | End: 2022-04-22 | Stop reason: HOSPADM

## 2022-04-20 RX ORDER — BUPIVACAINE HYDROCHLORIDE 5 MG/ML
15 INJECTION, SOLUTION EPIDURAL; INTRACAUDAL ONCE
Status: DISCONTINUED | OUTPATIENT
Start: 2022-04-20 | End: 2022-04-22 | Stop reason: HOSPADM

## 2022-04-21 NOTE — PROCEDURES
[facility]    Patient Name: Isamar Brown    : 1970    Age: 49 y.o. Sex: female    Date: 2022    Pre-op Diagnosis: left  Sacroiliac Joint(s) Dysfunction/ Sacroiliitis    Post-op Diagnosis: left  Sacroiliac Joint(s) Dysfunction/ Sacroliliits    Procedure: Ultrasound Guided Injection of  left Sacroiliac Joint(s)     Performing Procedure:  COLLEEN Cuevas  Patient Vitals for the past 24 hrs:   BP Temp Temp src Pulse Resp SpO2   22 1258 104/75 97.5 °F (36.4 °C) Temporal 82 18 94 %       left  Sacroiliac Joint(s)     Description of Procedure:    After voluntary, informed and signed consent obtained the patient was placed in a prone position. Appropriate time out was obtained per policy. The Sacroiliac Joint(s) was palpated for area of maximal tenderness. The area was prepped in an aseptic fashion with CHG swab. The ultrasound transducer was used to confirm the appropriate location. The skin was sprayed with Gebauer's Solution. Under aseptic technique and direct ultrasound visualization a 22 gauge 3 inch spinal needle was introduced into the Sacroiliac Joint(s). After a negative aspiration, a solution of 2 ml of 0.5% Marcaine Plain and 2 ml of 1% Lidocaine Plain and 1 ml of Kenalog (40 mg/ml) was injected into the Sacroiliac Joint(s). The needle was withdrawn and a sterile dressing applied. If this was a bilateral procedure, the same steps were followed on the opposite side. Discharge: The patient tolerated the procedure well. There were no complications during the procedure and the patient was discharged home with discharge instructions. The patient has been instructed to contact the office should there be any complications or questions to arise between today and their next appointment.     Description of Procedure:    Pre-op Diagnosis: Myofascial Pain/ Muscle Spasms    Post-op Diagnosis: Myofascial Pain/ Muscle Spasms    Procedure: Thoracic Trigger Point Injections    Performing Procedure: COLLEEN Neff [facility]    Patient Vitals for the past 24 hrs:   BP Temp Temp src Pulse Resp SpO2   04/20/22 1258 104/75 97.5 °F (36.4 °C) Temporal 82 18 94 %       Description of Procedure:  After a brief physical assessment and failure to improve with conservative measures the patient presented for Thoracic Trigger Point Injections The indications, limitations and possible complications were discussed with the patient and the patient elected to proceed with the procedure. After voluntary, informed and signed consent obtained the patient was placed in a seated position. Appropriate time out was obtained per policy. The areas were then prepped in a sterile fashion with Chloro-Prep. The area of maximal tenderness was palpated over the bilaterally Thoracic Muscles - Erector Spinae, Upper/Mid Latissimus, Rhomboid Minor, Rhomboid Major. The skin overlying these areas was marked with a skin marker. The areas were prepped using aseptic technique with CHG prep. The skin overlying the proposed injection sites were then sprayed with Gebauer's Solution while protecting patient eyes. Each trigger point of the Thoracic Muscles - Erector Spinae, Upper/Mid Latissimus, Rhomboid Minor, Rhomboid Major was injected after negative aspiration was injected with approximately 1-2 ml of a solution of 5 ml of 1% Lidocaine Plain and 5 ml of 0.5% Marcaine Plain after negative aspiration    Toradol 0.5 ml (30mg/ml) added to each syringed.      Pre-op Diagnosis: Myofascial Pain/ Muscle Spasms    Post-op Diagnosis: Myofascial Pain/ Muscle Spasms    Procedure: Lumbar Trigger Point Injections    Performing Procedure: COLLEEN Neff - [facility]    Patient Vitals for the past 24 hrs:   BP Temp Temp src Pulse Resp SpO2   04/20/22 1258 104/75 97.5 °F (36.4 °C) Temporal 82 18 94 %       Description of Procedure:    After a brief physical assessment and failure to improve with conservative measures the patient presented for Lumbar Trigger Point Injections The indications, limitations and possible complications were discussed with the patient and the patient elected to proceed with the procedure. After voluntary, informed and signed consent obtained the patient was placed in a seated position. Appropriate time out was obtained per policy. The area of maximal tenderness was palpated over the bilaterally Lumbar Muscles - Lower Latissimus,  Erector Spinae, Lumbar Paraspinous. The skin overlying these areas was marked with a skin marker. The areas were prepped using aseptic technique with CHG prep. The skin overlying the proposed injection sites were then sprayed with Gebauer's Solution while protecting patient eyes. Each trigger point of the Lumbar Muscles - Lower Latissimus,  Erector Spinae, Lumbar Paraspinous was injected after negative aspiration was injected with approximately 1-2 ml of a solution of 5 ml of 1% Lidocaine Plain and 5 ml of 0.5% Marcaine Plain after negative aspiration    Toradol 0.5 ml (30mg/ml) added to each syringed. Discharge: The patient tolerated the procedure well. There were no complications during the procedure and the patient was discharged home with discharge instructions. The patient has been instructed to contact the office should there be any complications or questions to arise between today and their next appointment. Plan:     Will return to the office in 6 weeks for follow up    JAEL Baxter CNP, 4/20/2022 at 9:39 PM

## 2022-04-27 ENCOUNTER — TELEPHONE (OUTPATIENT)
Dept: PAIN MANAGEMENT | Age: 52
End: 2022-04-27

## 2022-04-27 NOTE — TELEPHONE ENCOUNTER
Pain score 7/10 today and can tell that it has helped some about 30%.  She is having Left total hip replacement May 12, 2022

## 2022-04-29 ENCOUNTER — HOSPITAL ENCOUNTER (OUTPATIENT)
Dept: PREADMISSION TESTING | Age: 52
Discharge: HOME | End: 2022-05-03
Payer: MEDICAID

## 2022-04-29 VITALS — WEIGHT: 220 LBS | BODY MASS INDEX: 37.76 KG/M2

## 2022-04-29 LAB
ABO/RH: NORMAL
ANION GAP SERPL CALCULATED.3IONS-SCNC: 12 MMOL/L (ref 7–19)
ANTIBODY SCREEN: NORMAL
APTT: 29.4 SEC (ref 26–36.2)
BASOPHILS ABSOLUTE: 0 K/UL (ref 0–0.2)
BASOPHILS RELATIVE PERCENT: 0.4 % (ref 0–1)
BUN BLDV-MCNC: 19 MG/DL (ref 6–20)
CALCIUM SERPL-MCNC: 9.5 MG/DL (ref 8.6–10)
CHLORIDE BLD-SCNC: 101 MMOL/L (ref 98–111)
CO2: 28 MMOL/L (ref 22–29)
CREAT SERPL-MCNC: 1 MG/DL (ref 0.5–0.9)
EOSINOPHILS ABSOLUTE: 0.1 K/UL (ref 0–0.6)
EOSINOPHILS RELATIVE PERCENT: 1.7 % (ref 0–5)
GFR AFRICAN AMERICAN: >59
GFR NON-AFRICAN AMERICAN: 59
GLUCOSE BLD-MCNC: 89 MG/DL (ref 74–109)
HCT VFR BLD CALC: 44 % (ref 37–47)
HEMOGLOBIN: 14.4 G/DL (ref 12–16)
IMMATURE GRANULOCYTES #: 0 K/UL
INR BLD: 0.97 (ref 0.88–1.18)
LYMPHOCYTES ABSOLUTE: 1.8 K/UL (ref 1.1–4.5)
LYMPHOCYTES RELATIVE PERCENT: 21.4 % (ref 20–40)
MCH RBC QN AUTO: 30.5 PG (ref 27–31)
MCHC RBC AUTO-ENTMCNC: 32.7 G/DL (ref 33–37)
MCV RBC AUTO: 93.2 FL (ref 81–99)
MONOCYTES ABSOLUTE: 0.7 K/UL (ref 0–0.9)
MONOCYTES RELATIVE PERCENT: 8.7 % (ref 0–10)
MRSA SCREEN RT-PCR: NOT DETECTED
NEUTROPHILS ABSOLUTE: 5.7 K/UL (ref 1.5–7.5)
NEUTROPHILS RELATIVE PERCENT: 67.3 % (ref 50–65)
PDW BLD-RTO: 12.7 % (ref 11.5–14.5)
PLATELET # BLD: 240 K/UL (ref 130–400)
PMV BLD AUTO: 10.6 FL (ref 9.4–12.3)
POTASSIUM SERPL-SCNC: 3.8 MMOL/L (ref 3.5–5)
PROTHROMBIN TIME: 12.8 SEC (ref 12–14.6)
RBC # BLD: 4.72 M/UL (ref 4.2–5.4)
SODIUM BLD-SCNC: 141 MMOL/L (ref 136–145)
WBC # BLD: 8.4 K/UL (ref 4.8–10.8)

## 2022-04-29 PROCEDURE — 85730 THROMBOPLASTIN TIME PARTIAL: CPT

## 2022-04-29 PROCEDURE — 85025 COMPLETE CBC W/AUTO DIFF WBC: CPT

## 2022-04-29 PROCEDURE — 80048 BASIC METABOLIC PNL TOTAL CA: CPT

## 2022-04-29 PROCEDURE — 86900 BLOOD TYPING SEROLOGIC ABO: CPT

## 2022-04-29 PROCEDURE — 93005 ELECTROCARDIOGRAM TRACING: CPT | Performed by: ORTHOPAEDIC SURGERY

## 2022-04-29 PROCEDURE — 86850 RBC ANTIBODY SCREEN: CPT

## 2022-04-29 PROCEDURE — 87641 MR-STAPH DNA AMP PROBE: CPT

## 2022-04-29 PROCEDURE — 86901 BLOOD TYPING SEROLOGIC RH(D): CPT

## 2022-04-29 PROCEDURE — 85610 PROTHROMBIN TIME: CPT

## 2022-04-30 LAB
EKG P AXIS: 16 DEGREES
EKG P-R INTERVAL: 130 MS
EKG Q-T INTERVAL: 346 MS
EKG QRS DURATION: 72 MS
EKG QTC CALCULATION (BAZETT): 398 MS
EKG T AXIS: -2 DEGREES

## 2022-04-30 PROCEDURE — 93010 ELECTROCARDIOGRAM REPORT: CPT | Performed by: INTERNAL MEDICINE

## 2022-05-09 DIAGNOSIS — M54.50 CHRONIC BILATERAL LOW BACK PAIN WITHOUT SCIATICA: ICD-10-CM

## 2022-05-09 DIAGNOSIS — G89.29 CHRONIC BILATERAL LOW BACK PAIN WITHOUT SCIATICA: ICD-10-CM

## 2022-05-09 RX ORDER — HYDROCODONE BITARTRATE AND ACETAMINOPHEN 7.5; 325 MG/1; MG/1
1 TABLET ORAL EVERY 8 HOURS PRN
Qty: 90 TABLET | Refills: 0 | Status: ON HOLD | OUTPATIENT
Start: 2022-05-14 | End: 2022-05-13 | Stop reason: HOSPADM

## 2022-05-12 ENCOUNTER — ANESTHESIA (OUTPATIENT)
Dept: OPERATING ROOM | Age: 52
DRG: 470 | End: 2022-05-12
Payer: MEDICAID

## 2022-05-12 ENCOUNTER — HOSPITAL ENCOUNTER (INPATIENT)
Age: 52
LOS: 1 days | Discharge: HOME HEALTH | DRG: 470 | End: 2022-05-13
Attending: ORTHOPAEDIC SURGERY | Admitting: ORTHOPAEDIC SURGERY
Payer: MEDICAID

## 2022-05-12 ENCOUNTER — APPOINTMENT (OUTPATIENT)
Dept: GENERAL RADIOLOGY | Age: 52
DRG: 470 | End: 2022-05-12
Attending: ORTHOPAEDIC SURGERY
Payer: MEDICAID

## 2022-05-12 ENCOUNTER — ANESTHESIA EVENT (OUTPATIENT)
Dept: OPERATING ROOM | Age: 52
DRG: 470 | End: 2022-05-12
Payer: MEDICAID

## 2022-05-12 VITALS — TEMPERATURE: 96.9 F | DIASTOLIC BLOOD PRESSURE: 54 MMHG | SYSTOLIC BLOOD PRESSURE: 105 MMHG | OXYGEN SATURATION: 93 %

## 2022-05-12 DIAGNOSIS — M16.12 PRIMARY OSTEOARTHRITIS OF LEFT HIP: Primary | ICD-10-CM

## 2022-05-12 LAB
ABO/RH: NORMAL
ANTIBODY SCREEN: NORMAL

## 2022-05-12 PROCEDURE — 2709999900 HC NON-CHARGEABLE SUPPLY: Performed by: ORTHOPAEDIC SURGERY

## 2022-05-12 PROCEDURE — 6370000000 HC RX 637 (ALT 250 FOR IP): Performed by: ORTHOPAEDIC SURGERY

## 2022-05-12 PROCEDURE — 2580000003 HC RX 258: Performed by: NURSE ANESTHETIST, CERTIFIED REGISTERED

## 2022-05-12 PROCEDURE — 3700000001 HC ADD 15 MINUTES (ANESTHESIA): Performed by: ORTHOPAEDIC SURGERY

## 2022-05-12 PROCEDURE — 3209999900 FLUORO FOR SURGICAL PROCEDURES

## 2022-05-12 PROCEDURE — 6360000002 HC RX W HCPCS: Performed by: ORTHOPAEDIC SURGERY

## 2022-05-12 PROCEDURE — C1776 JOINT DEVICE (IMPLANTABLE): HCPCS | Performed by: ORTHOPAEDIC SURGERY

## 2022-05-12 PROCEDURE — 6360000002 HC RX W HCPCS: Performed by: ANESTHESIOLOGY

## 2022-05-12 PROCEDURE — 2500000003 HC RX 250 WO HCPCS: Performed by: NURSE ANESTHETIST, CERTIFIED REGISTERED

## 2022-05-12 PROCEDURE — A4217 STERILE WATER/SALINE, 500 ML: HCPCS | Performed by: ORTHOPAEDIC SURGERY

## 2022-05-12 PROCEDURE — 0SRB0JZ REPLACEMENT OF LEFT HIP JOINT WITH SYNTHETIC SUBSTITUTE, OPEN APPROACH: ICD-10-PCS | Performed by: ORTHOPAEDIC SURGERY

## 2022-05-12 PROCEDURE — 86850 RBC ANTIBODY SCREEN: CPT

## 2022-05-12 PROCEDURE — 36415 COLL VENOUS BLD VENIPUNCTURE: CPT

## 2022-05-12 PROCEDURE — 7100000000 HC PACU RECOVERY - FIRST 15 MIN: Performed by: ORTHOPAEDIC SURGERY

## 2022-05-12 PROCEDURE — 86900 BLOOD TYPING SEROLOGIC ABO: CPT

## 2022-05-12 PROCEDURE — 3600000015 HC SURGERY LEVEL 5 ADDTL 15MIN: Performed by: ORTHOPAEDIC SURGERY

## 2022-05-12 PROCEDURE — 2580000003 HC RX 258: Performed by: ORTHOPAEDIC SURGERY

## 2022-05-12 PROCEDURE — 86901 BLOOD TYPING SEROLOGIC RH(D): CPT

## 2022-05-12 PROCEDURE — 7100000001 HC PACU RECOVERY - ADDTL 15 MIN: Performed by: ORTHOPAEDIC SURGERY

## 2022-05-12 PROCEDURE — 2700000000 HC OXYGEN THERAPY PER DAY

## 2022-05-12 PROCEDURE — 2500000003 HC RX 250 WO HCPCS: Performed by: ORTHOPAEDIC SURGERY

## 2022-05-12 PROCEDURE — G0378 HOSPITAL OBSERVATION PER HR: HCPCS

## 2022-05-12 PROCEDURE — 3600000005 HC SURGERY LEVEL 5 BASE: Performed by: ORTHOPAEDIC SURGERY

## 2022-05-12 PROCEDURE — 3700000000 HC ANESTHESIA ATTENDED CARE: Performed by: ORTHOPAEDIC SURGERY

## 2022-05-12 PROCEDURE — 6360000002 HC RX W HCPCS: Performed by: NURSE ANESTHETIST, CERTIFIED REGISTERED

## 2022-05-12 PROCEDURE — 6370000000 HC RX 637 (ALT 250 FOR IP): Performed by: ANESTHESIOLOGY

## 2022-05-12 PROCEDURE — 73502 X-RAY EXAM HIP UNI 2-3 VIEWS: CPT

## 2022-05-12 DEVICE — IMPLANTABLE DEVICE
Type: IMPLANTABLE DEVICE | Site: HIP | Status: FUNCTIONAL
Brand: LOGICAL

## 2022-05-12 DEVICE — IMPLANTABLE DEVICE
Type: IMPLANTABLE DEVICE | Site: HIP | Status: FUNCTIONAL
Brand: ORIGIN COXA VARA HIP STEM

## 2022-05-12 DEVICE — HEAD, FEMORAL, CERAMIC, BILOX DELTA, 36MM NEUTRAL
Type: IMPLANTABLE DEVICE | Site: HIP | Status: FUNCTIONAL
Brand: DJO SURGICAL

## 2022-05-12 DEVICE — SHELL ACET 3 HOLE 50 MM HIP LOGICAL G-SERIES: Type: IMPLANTABLE DEVICE | Site: HIP | Status: FUNCTIONAL

## 2022-05-12 RX ORDER — OXYCODONE HCL 10 MG/1
10 TABLET, FILM COATED, EXTENDED RELEASE ORAL
Status: COMPLETED | OUTPATIENT
Start: 2022-05-12 | End: 2022-05-12

## 2022-05-12 RX ORDER — SODIUM CHLORIDE 0.9 % (FLUSH) 0.9 %
5-40 SYRINGE (ML) INJECTION PRN
Status: DISCONTINUED | OUTPATIENT
Start: 2022-05-12 | End: 2022-05-12 | Stop reason: HOSPADM

## 2022-05-12 RX ORDER — MIDAZOLAM HYDROCHLORIDE 1 MG/ML
INJECTION INTRAMUSCULAR; INTRAVENOUS PRN
Status: DISCONTINUED | OUTPATIENT
Start: 2022-05-12 | End: 2022-05-12 | Stop reason: SDUPTHER

## 2022-05-12 RX ORDER — SODIUM CHLORIDE, SODIUM LACTATE, POTASSIUM CHLORIDE, CALCIUM CHLORIDE 600; 310; 30; 20 MG/100ML; MG/100ML; MG/100ML; MG/100ML
INJECTION, SOLUTION INTRAVENOUS CONTINUOUS
Status: DISCONTINUED | OUTPATIENT
Start: 2022-05-12 | End: 2022-05-12 | Stop reason: HOSPADM

## 2022-05-12 RX ORDER — SCOLOPAMINE TRANSDERMAL SYSTEM 1 MG/1
1 PATCH, EXTENDED RELEASE TRANSDERMAL ONCE
Status: DISCONTINUED | OUTPATIENT
Start: 2022-05-12 | End: 2022-05-12

## 2022-05-12 RX ORDER — SODIUM CHLORIDE 0.9 % (FLUSH) 0.9 %
5-40 SYRINGE (ML) INJECTION EVERY 12 HOURS SCHEDULED
Status: DISCONTINUED | OUTPATIENT
Start: 2022-05-12 | End: 2022-05-13 | Stop reason: HOSPADM

## 2022-05-12 RX ORDER — DULOXETIN HYDROCHLORIDE 30 MG/1
30 CAPSULE, DELAYED RELEASE ORAL DAILY
Status: DISCONTINUED | OUTPATIENT
Start: 2022-05-13 | End: 2022-05-13 | Stop reason: HOSPADM

## 2022-05-12 RX ORDER — ACETAMINOPHEN 500 MG
1000 TABLET ORAL ONCE
Status: COMPLETED | OUTPATIENT
Start: 2022-05-12 | End: 2022-05-12

## 2022-05-12 RX ORDER — METOCLOPRAMIDE 10 MG/1
10 TABLET ORAL ONCE
Status: COMPLETED | OUTPATIENT
Start: 2022-05-12 | End: 2022-05-12

## 2022-05-12 RX ORDER — FAMOTIDINE 20 MG/1
20 TABLET, FILM COATED ORAL ONCE
Status: DISCONTINUED | OUTPATIENT
Start: 2022-05-12 | End: 2022-05-12 | Stop reason: HOSPADM

## 2022-05-12 RX ORDER — FENTANYL CITRATE 50 UG/ML
INJECTION, SOLUTION INTRAMUSCULAR; INTRAVENOUS PRN
Status: DISCONTINUED | OUTPATIENT
Start: 2022-05-12 | End: 2022-05-12 | Stop reason: SDUPTHER

## 2022-05-12 RX ORDER — M-VIT,TX,IRON,MINS/CALC/FOLIC 27MG-0.4MG
1 TABLET ORAL DAILY
Status: DISCONTINUED | OUTPATIENT
Start: 2022-05-12 | End: 2022-05-13 | Stop reason: HOSPADM

## 2022-05-12 RX ORDER — CLOBETASOL PROPIONATE 0.5 MG/G
CREAM TOPICAL 2 TIMES DAILY
Status: DISCONTINUED | OUTPATIENT
Start: 2022-05-12 | End: 2022-05-13 | Stop reason: HOSPADM

## 2022-05-12 RX ORDER — METOCLOPRAMIDE HYDROCHLORIDE 5 MG/ML
10 INJECTION INTRAMUSCULAR; INTRAVENOUS
Status: DISCONTINUED | OUTPATIENT
Start: 2022-05-12 | End: 2022-05-12 | Stop reason: HOSPADM

## 2022-05-12 RX ORDER — CELECOXIB 100 MG/1
100 CAPSULE ORAL ONCE
Status: COMPLETED | OUTPATIENT
Start: 2022-05-12 | End: 2022-05-12

## 2022-05-12 RX ORDER — SODIUM CHLORIDE 0.9 % (FLUSH) 0.9 %
5-40 SYRINGE (ML) INJECTION PRN
Status: DISCONTINUED | OUTPATIENT
Start: 2022-05-12 | End: 2022-05-13 | Stop reason: HOSPADM

## 2022-05-12 RX ORDER — SODIUM CHLORIDE 0.9 % (FLUSH) 0.9 %
5-40 SYRINGE (ML) INJECTION EVERY 12 HOURS SCHEDULED
Status: DISCONTINUED | OUTPATIENT
Start: 2022-05-12 | End: 2022-05-12 | Stop reason: HOSPADM

## 2022-05-12 RX ORDER — MEPERIDINE HYDROCHLORIDE 25 MG/ML
12.5 INJECTION INTRAMUSCULAR; INTRAVENOUS; SUBCUTANEOUS EVERY 5 MIN PRN
Status: DISCONTINUED | OUTPATIENT
Start: 2022-05-12 | End: 2022-05-12 | Stop reason: HOSPADM

## 2022-05-12 RX ORDER — LIDOCAINE HYDROCHLORIDE 10 MG/ML
INJECTION, SOLUTION EPIDURAL; INFILTRATION; INTRACAUDAL; PERINEURAL PRN
Status: DISCONTINUED | OUTPATIENT
Start: 2022-05-12 | End: 2022-05-12 | Stop reason: SDUPTHER

## 2022-05-12 RX ORDER — SODIUM CHLORIDE, SODIUM LACTATE, POTASSIUM CHLORIDE, CALCIUM CHLORIDE 600; 310; 30; 20 MG/100ML; MG/100ML; MG/100ML; MG/100ML
INJECTION, SOLUTION INTRAVENOUS CONTINUOUS PRN
Status: DISCONTINUED | OUTPATIENT
Start: 2022-05-12 | End: 2022-05-12 | Stop reason: SDUPTHER

## 2022-05-12 RX ORDER — DIPHENHYDRAMINE HYDROCHLORIDE 50 MG/ML
12.5 INJECTION INTRAMUSCULAR; INTRAVENOUS
Status: DISCONTINUED | OUTPATIENT
Start: 2022-05-12 | End: 2022-05-12 | Stop reason: HOSPADM

## 2022-05-12 RX ORDER — ONDANSETRON 2 MG/ML
INJECTION INTRAMUSCULAR; INTRAVENOUS PRN
Status: DISCONTINUED | OUTPATIENT
Start: 2022-05-12 | End: 2022-05-12 | Stop reason: SDUPTHER

## 2022-05-12 RX ORDER — PANTOPRAZOLE SODIUM 40 MG/1
40 TABLET, DELAYED RELEASE ORAL
Status: DISCONTINUED | OUTPATIENT
Start: 2022-05-13 | End: 2022-05-13 | Stop reason: HOSPADM

## 2022-05-12 RX ORDER — ACETAMINOPHEN 325 MG/1
650 TABLET ORAL EVERY 6 HOURS
Status: DISCONTINUED | OUTPATIENT
Start: 2022-05-12 | End: 2022-05-13 | Stop reason: HOSPADM

## 2022-05-12 RX ORDER — FAMOTIDINE 20 MG/1
20 TABLET, FILM COATED ORAL
Status: COMPLETED | OUTPATIENT
Start: 2022-05-12 | End: 2022-05-12

## 2022-05-12 RX ORDER — TIZANIDINE 4 MG/1
4 TABLET ORAL EVERY 8 HOURS PRN
Status: DISCONTINUED | OUTPATIENT
Start: 2022-05-12 | End: 2022-05-13 | Stop reason: HOSPADM

## 2022-05-12 RX ORDER — DEXAMETHASONE SODIUM PHOSPHATE 10 MG/ML
8 INJECTION, SOLUTION INTRAMUSCULAR; INTRAVENOUS ONCE
Status: DISCONTINUED | OUTPATIENT
Start: 2022-05-12 | End: 2022-05-12 | Stop reason: HOSPADM

## 2022-05-12 RX ORDER — APREPITANT 40 MG/1
40 CAPSULE ORAL ONCE
Status: COMPLETED | OUTPATIENT
Start: 2022-05-12 | End: 2022-05-12

## 2022-05-12 RX ORDER — PROPOFOL 10 MG/ML
INJECTION, EMULSION INTRAVENOUS PRN
Status: DISCONTINUED | OUTPATIENT
Start: 2022-05-12 | End: 2022-05-12 | Stop reason: SDUPTHER

## 2022-05-12 RX ORDER — ROPIVACAINE HYDROCHLORIDE 2 MG/ML
INJECTION, SOLUTION EPIDURAL; INFILTRATION; PERINEURAL PRN
Status: DISCONTINUED | OUTPATIENT
Start: 2022-05-12 | End: 2022-05-12 | Stop reason: HOSPADM

## 2022-05-12 RX ORDER — MORPHINE SULFATE 2 MG/ML
2 INJECTION, SOLUTION INTRAMUSCULAR; INTRAVENOUS EVERY 5 MIN PRN
Status: DISCONTINUED | OUTPATIENT
Start: 2022-05-12 | End: 2022-05-12 | Stop reason: HOSPADM

## 2022-05-12 RX ORDER — MORPHINE SULFATE 4 MG/ML
4 INJECTION, SOLUTION INTRAMUSCULAR; INTRAVENOUS EVERY 5 MIN PRN
Status: DISCONTINUED | OUTPATIENT
Start: 2022-05-12 | End: 2022-05-12 | Stop reason: HOSPADM

## 2022-05-12 RX ORDER — CLOBETASOL PROPIONATE 0.5 MG/ML
LOTION TOPICAL 2 TIMES DAILY
Status: DISCONTINUED | OUTPATIENT
Start: 2022-05-12 | End: 2022-05-12 | Stop reason: CLARIF

## 2022-05-12 RX ORDER — MULTIVIT-MIN/IRON/FOLIC ACID/K 18-600-40
1000 CAPSULE ORAL DAILY
COMMUNITY

## 2022-05-12 RX ORDER — MORPHINE SULFATE 4 MG/ML
4 INJECTION, SOLUTION INTRAMUSCULAR; INTRAVENOUS
Status: DISCONTINUED | OUTPATIENT
Start: 2022-05-12 | End: 2022-05-13 | Stop reason: HOSPADM

## 2022-05-12 RX ORDER — MIDAZOLAM HYDROCHLORIDE 1 MG/ML
2 INJECTION INTRAMUSCULAR; INTRAVENOUS
Status: DISCONTINUED | OUTPATIENT
Start: 2022-05-12 | End: 2022-05-12 | Stop reason: HOSPADM

## 2022-05-12 RX ORDER — SODIUM CHLORIDE 9 MG/ML
INJECTION, SOLUTION INTRAVENOUS PRN
Status: DISCONTINUED | OUTPATIENT
Start: 2022-05-12 | End: 2022-05-13 | Stop reason: HOSPADM

## 2022-05-12 RX ORDER — LIDOCAINE HYDROCHLORIDE 10 MG/ML
1 INJECTION, SOLUTION EPIDURAL; INFILTRATION; INTRACAUDAL; PERINEURAL
Status: DISCONTINUED | OUTPATIENT
Start: 2022-05-12 | End: 2022-05-12 | Stop reason: HOSPADM

## 2022-05-12 RX ORDER — SODIUM CHLORIDE 9 MG/ML
INJECTION, SOLUTION INTRAVENOUS PRN
Status: DISCONTINUED | OUTPATIENT
Start: 2022-05-12 | End: 2022-05-12 | Stop reason: HOSPADM

## 2022-05-12 RX ORDER — TRANEXAMIC ACID 650 1/1
1950 TABLET ORAL
Status: COMPLETED | OUTPATIENT
Start: 2022-05-12 | End: 2022-05-12

## 2022-05-12 RX ORDER — VITAMIN B COMPLEX
1000 TABLET ORAL DAILY
Status: DISCONTINUED | OUTPATIENT
Start: 2022-05-13 | End: 2022-05-13 | Stop reason: HOSPADM

## 2022-05-12 RX ORDER — OXYCODONE HYDROCHLORIDE 5 MG/1
5 TABLET ORAL EVERY 4 HOURS PRN
Status: DISCONTINUED | OUTPATIENT
Start: 2022-05-12 | End: 2022-05-13 | Stop reason: HOSPADM

## 2022-05-12 RX ORDER — ROCURONIUM BROMIDE 10 MG/ML
INJECTION, SOLUTION INTRAVENOUS PRN
Status: DISCONTINUED | OUTPATIENT
Start: 2022-05-12 | End: 2022-05-12 | Stop reason: SDUPTHER

## 2022-05-12 RX ORDER — MORPHINE SULFATE 2 MG/ML
2 INJECTION, SOLUTION INTRAMUSCULAR; INTRAVENOUS
Status: DISCONTINUED | OUTPATIENT
Start: 2022-05-12 | End: 2022-05-13 | Stop reason: HOSPADM

## 2022-05-12 RX ORDER — OXYCODONE HYDROCHLORIDE 5 MG/1
10 TABLET ORAL EVERY 4 HOURS PRN
Status: DISCONTINUED | OUTPATIENT
Start: 2022-05-12 | End: 2022-05-13 | Stop reason: HOSPADM

## 2022-05-12 RX ADMIN — LIDOCAINE HYDROCHLORIDE 50 MG: 10 INJECTION, SOLUTION EPIDURAL; INFILTRATION; INTRACAUDAL; PERINEURAL at 12:19

## 2022-05-12 RX ADMIN — FENTANYL CITRATE 100 MCG: 50 INJECTION, SOLUTION INTRAMUSCULAR; INTRAVENOUS at 12:19

## 2022-05-12 RX ADMIN — FENTANYL CITRATE 50 MCG: 50 INJECTION, SOLUTION INTRAMUSCULAR; INTRAVENOUS at 12:41

## 2022-05-12 RX ADMIN — SUGAMMADEX 200 MG: 100 INJECTION, SOLUTION INTRAVENOUS at 13:54

## 2022-05-12 RX ADMIN — ASPIRIN 325 MG: 325 TABLET, COATED ORAL at 21:15

## 2022-05-12 RX ADMIN — ACETAMINOPHEN 650 MG: 325 TABLET ORAL at 16:14

## 2022-05-12 RX ADMIN — ONDANSETRON 4 MG: 2 INJECTION INTRAMUSCULAR; INTRAVENOUS at 12:19

## 2022-05-12 RX ADMIN — MULTIPLE VITAMINS W/ MINERALS TAB 1 TABLET: TAB at 16:14

## 2022-05-12 RX ADMIN — Medication 2000 MG: at 12:14

## 2022-05-12 RX ADMIN — SODIUM CHLORIDE, SODIUM LACTATE, POTASSIUM CHLORIDE, AND CALCIUM CHLORIDE: 600; 310; 30; 20 INJECTION, SOLUTION INTRAVENOUS at 12:11

## 2022-05-12 RX ADMIN — ROCURONIUM BROMIDE 10 MG: 10 INJECTION, SOLUTION INTRAVENOUS at 13:10

## 2022-05-12 RX ADMIN — SODIUM CHLORIDE, POTASSIUM CHLORIDE, SODIUM LACTATE AND CALCIUM CHLORIDE: 600; 310; 30; 20 INJECTION, SOLUTION INTRAVENOUS at 10:47

## 2022-05-12 RX ADMIN — FENTANYL CITRATE 50 MCG: 50 INJECTION, SOLUTION INTRAMUSCULAR; INTRAVENOUS at 13:21

## 2022-05-12 RX ADMIN — PROPOFOL 150 MG: 10 INJECTION, EMULSION INTRAVENOUS at 12:19

## 2022-05-12 RX ADMIN — MORPHINE SULFATE 2 MG: 2 INJECTION, SOLUTION INTRAMUSCULAR; INTRAVENOUS at 21:09

## 2022-05-12 RX ADMIN — CELECOXIB 100 MG: 100 CAPSULE ORAL at 10:37

## 2022-05-12 RX ADMIN — ACETAMINOPHEN 650 MG: 325 TABLET ORAL at 21:05

## 2022-05-12 RX ADMIN — MIDAZOLAM 2 MG: 1 INJECTION INTRAMUSCULAR; INTRAVENOUS at 12:11

## 2022-05-12 RX ADMIN — TIZANIDINE 4 MG: 4 TABLET ORAL at 17:59

## 2022-05-12 RX ADMIN — ROCURONIUM BROMIDE 50 MG: 10 INJECTION, SOLUTION INTRAVENOUS at 12:19

## 2022-05-12 RX ADMIN — Medication 2000 MG: at 21:12

## 2022-05-12 RX ADMIN — MORPHINE SULFATE 4 MG: 4 INJECTION, SOLUTION INTRAMUSCULAR; INTRAVENOUS at 14:16

## 2022-05-12 RX ADMIN — FAMOTIDINE 20 MG: 20 TABLET ORAL at 10:46

## 2022-05-12 RX ADMIN — MORPHINE SULFATE 4 MG: 4 INJECTION, SOLUTION INTRAMUSCULAR; INTRAVENOUS at 14:26

## 2022-05-12 RX ADMIN — METOCLOPRAMIDE 10 MG: 10 TABLET ORAL at 10:46

## 2022-05-12 RX ADMIN — OXYCODONE HYDROCHLORIDE 10 MG: 10 TABLET, FILM COATED, EXTENDED RELEASE ORAL at 10:37

## 2022-05-12 RX ADMIN — OXYCODONE 10 MG: 5 TABLET ORAL at 22:14

## 2022-05-12 RX ADMIN — TRANEXAMIC ACID 1950 MG: 650 TABLET ORAL at 10:37

## 2022-05-12 RX ADMIN — MORPHINE SULFATE 4 MG: 4 INJECTION, SOLUTION INTRAMUSCULAR; INTRAVENOUS at 14:41

## 2022-05-12 RX ADMIN — APREPITANT 40 MG: 40 CAPSULE ORAL at 10:46

## 2022-05-12 RX ADMIN — OXYCODONE 10 MG: 5 TABLET ORAL at 17:59

## 2022-05-12 RX ADMIN — ACETAMINOPHEN 1000 MG: 500 TABLET ORAL at 10:36

## 2022-05-12 ASSESSMENT — PAIN - FUNCTIONAL ASSESSMENT
PAIN_FUNCTIONAL_ASSESSMENT: PREVENTS OR INTERFERES SOME ACTIVE ACTIVITIES AND ADLS
PAIN_FUNCTIONAL_ASSESSMENT: PREVENTS OR INTERFERES SOME ACTIVE ACTIVITIES AND ADLS
PAIN_FUNCTIONAL_ASSESSMENT: ACTIVITIES ARE NOT PREVENTED
PAIN_FUNCTIONAL_ASSESSMENT: PREVENTS OR INTERFERES WITH MANY ACTIVE NOT PASSIVE ACTIVITIES

## 2022-05-12 ASSESSMENT — PAIN DESCRIPTION - ORIENTATION
ORIENTATION: LEFT
ORIENTATION: RIGHT
ORIENTATION: LEFT
ORIENTATION: LOWER;LEFT
ORIENTATION: LEFT

## 2022-05-12 ASSESSMENT — PAIN DESCRIPTION - DESCRIPTORS
DESCRIPTORS: ACHING
DESCRIPTORS: SHARP
DESCRIPTORS: ACHING
DESCRIPTORS: DISCOMFORT

## 2022-05-12 ASSESSMENT — PAIN SCALES - GENERAL
PAINLEVEL_OUTOF10: 10
PAINLEVEL_OUTOF10: 0
PAINLEVEL_OUTOF10: 0
PAINLEVEL_OUTOF10: 10
PAINLEVEL_OUTOF10: 0
PAINLEVEL_OUTOF10: 10
PAINLEVEL_OUTOF10: 0
PAINLEVEL_OUTOF10: 5

## 2022-05-12 ASSESSMENT — PAIN DESCRIPTION - ONSET: ONSET: ON-GOING

## 2022-05-12 ASSESSMENT — PAIN DESCRIPTION - LOCATION
LOCATION: HIP
LOCATION: BACK;HIP
LOCATION: HIP

## 2022-05-12 ASSESSMENT — LIFESTYLE VARIABLES: SMOKING_STATUS: 0

## 2022-05-12 ASSESSMENT — PAIN DESCRIPTION - PAIN TYPE: TYPE: SURGICAL PAIN

## 2022-05-12 ASSESSMENT — PAIN DESCRIPTION - FREQUENCY: FREQUENCY: CONTINUOUS

## 2022-05-12 ASSESSMENT — ENCOUNTER SYMPTOMS: SHORTNESS OF BREATH: 0

## 2022-05-12 NOTE — CARE COORDINATION
HH referral received. Per Jessica Tejada Navigator, patient agreeable and has chosen [facility]. Referral Faxed. [facility] [phone]. [phone]. Please notify [facility] when patient discharges and fax DC Summary,  DC med list and any new [facility] orders. The Patient and/or patient representative was provided with a choice of provider and agrees   with the discharge plan. [x] Yes [] No    Freedom of choice list was provided with basic dialogue that supports the patient's individualized plan of care/goals, treatment preferences and shares the quality data associated with the providers.  [x] Yes [] No  Electronically signed by Phoenix Gama on 5/12/2022 at 3:33 PM

## 2022-05-12 NOTE — ANESTHESIA PRE PROCEDURE
Department of Anesthesiology  Preprocedure Note       Name:  Ferdrick Luz   Age:  49 y.o.  :  1970                                          MRN:  [id number]         Date:  2022      Surgeon: Gurmeet Hogan):  Natan Jin MD    Procedure: Procedure(s):  LEFT HIP TOTAL ARTHROPLASTY    Medications prior to admission:   Prior to Admission medications    Medication Sig Start Date End Date Taking? Authorizing Provider   vitamin D (D3-1000) 25 MCG (1000 UT) TABS tablet Take 1,000 Units by mouth daily   Yes Historical Provider, MD   HYDROcodone-acetaminophen (NORCO) 7.5-325 MG per tablet Take 1 tablet by mouth every 8 hours as needed for Pain for up to 30 days. (may fill 22) 22  JAEL Baxter CNP   omeprazole (PRILOSEC) 20 MG delayed release capsule Take 1 capsule by mouth every morning (before breakfast) 3/29/22   JAEL Gonzáles   DULoxetine (CYMBALTA) 30 MG extended release capsule Take 1 capsule by mouth daily 3/29/22   JAEL Gonzáles   clobetasol propionate 0.05 % LOTN lotion Apply to affected area twice daily for psoriasis. Avoid use on face.  3/29/22   JAEL Gonzáles   Multiple Vitamins-Minerals (THERAPEUTIC MULTIVITAMIN-MINERALS) tablet Take 1 tablet by mouth daily    Historical Provider, MD   tiZANidine (ZANAFLEX) 4 MG tablet 1/4 tablet with meals 1/2 to whole tablet at bedtime 3/14/22   JAEL Baxter CNP       Current medications:    Current Facility-Administered Medications   Medication Dose Route Frequency Provider Last Rate Last Admin    oxyCODONE (OXYCONTIN) extended release tablet 10 mg  10 mg Oral 60 Min Pre-Op Natan Jin MD        scopolamine (TRANSDERM-SCOP) transdermal patch 1 patch  1 patch TransDERmal Once Natan Jin MD        acetaminophen (TYLENOL) tablet 1,000 mg  1,000 mg Oral Once Natan Jin MD        celecoxib (CELEBREX) capsule 100 mg  100 mg Oral Once Ramone Metcalf MD        dexamethasone (PF) (DECADRON) injection 8 mg  8 mg IntraVENous Once Ramone Metcalf MD        tranexamic acid (LYSTEDA) tablet 1,950 mg  1,950 mg Oral On Call to OR Ramone Metcalf MD        lactated ringers infusion   IntraVENous Continuous Ramone Metcalf MD        sodium chloride flush 0.9 % injection 5-40 mL  5-40 mL IntraVENous 2 times per day Ramone Metcalf MD        sodium chloride flush 0.9 % injection 5-40 mL  5-40 mL IntraVENous PRN Ramone Metcalf MD        0.9 % sodium chloride infusion   IntraVENous PRN Ramone Metcalf MD        ceFAZolin (ANCEF) 2000 mg in 0.9% sodium chloride 50 mL IVPB  2,000 mg IntraVENous On Call to [address], [place]           Allergies:  No Known Allergies    Problem List:    Patient Active Problem List   Diagnosis Code    GERD (gastroesophageal reflux disease) K21.9    Familial hyperlipidemia E78.49    Psoriasis L40.9    Chronic bilateral low back pain without sciatica M54.50, G89.29    Cigarette nicotine dependence without complication F10.775    Chronic insomnia F51.04    Vitamin D deficiency E55.9    Chronic neck pain M54.2, G89.29    Degenerative disc disease, cervical M50.30    Degenerative disc disease, lumbar M51.36    Degenerative disc disease, thoracic M51.34    Chronic midline thoracic back pain M54.6, G89.29    Bilateral myofascial pain M79.18    Pain medication agreement Z02.89    Sacroiliac joint dysfunction of left side M53.3    Cervical paraspinal muscle spasm M62.838    Back muscle spasm M62.830    History of right hip replacement Z96.641    Numbness and tingling in both hands R20.0, R20.2    Myalgia M79.10    Primary insomnia F51.01       Past Medical History:        Diagnosis Date    Abnormal Pap smear of cervix     GERD (gastroesophageal reflux disease)     Hip pain     Hyperlipidemia     Hypertension     Patient thinks she has HTN but untreated    Nephrolithiasis     Primary osteoarthritis of right hip 12/3/2019    Psoriasis        Past Surgical History:        Procedure Laterality Date    CHOLECYSTECTOMY      HYSTERECTOMY      HYSTERECTOMY, VAGINAL      No BSO    LITHOTRIPSY      TOTAL HIP ARTHROPLASTY Right 2020    HIP TOTAL ARTHROPLASTY ANTERIOR APPROACH performed by Roland Bassett MD at [place] [address] History:    Social History     Tobacco Use    Smoking status: Former Smoker     Packs/day: 1.00     Years: 30.00     Pack years: 30.00     Types: Cigarettes     Quit date: 2019     Years since quittin.4    Smokeless tobacco: Never Used   Substance Use Topics    Alcohol use: Not Currently                                Counseling given: Not Answered      Vital Signs (Current):   Vitals:    22 1017   BP: 135/80   Pulse: 90   Resp: 16   Temp: 96.4 °F (35.8 °C)   TempSrc: Temporal   SpO2: 95%   Weight: 220 lb (99.8 kg)   Height: 5' 4\" (1.626 m)                                              BP Readings from Last 3 Encounters:   22 135/80   22 104/75   22 126/80       NPO Status: Time of last liquid consumption:                         Time of last solid consumption:                         Date of last liquid consumption: 22                        Date of last solid food consumption: 22    BMI:   Wt Readings from Last 3 Encounters:   22 220 lb (99.8 kg)   22 220 lb (99.8 kg)   22 224 lb (101.6 kg)     Body mass index is 37.76 kg/m².     CBC:   Lab Results   Component Value Date    WBC 8.4 2022    RBC 4.72 2022    HGB 14.4 2022    HCT 44.0 2022    MCV 93.2 2022    RDW 12.7 2022     2022       CMP:   Lab Results   Component Value Date     2022    K 3.8 2022    K 3.6 2020     2022    CO2 28 2022    BUN 19 2022    CREATININE 1.0 2022    GFRAA >59 04/29/2022    LABGLOM 59 04/29/2022    GLUCOSE 89 04/29/2022    PROT 7.1 01/04/2022    CALCIUM 9.5 04/29/2022    BILITOT 0.3 01/04/2022    ALKPHOS 85 01/04/2022    AST 23 01/04/2022    ALT 21 01/04/2022       POC Tests: No results for input(s): POCGLU, POCNA, POCK, POCCL, POCBUN, POCHEMO, POCHCT in the last 72 hours. Coags:   Lab Results   Component Value Date    PROTIME 12.8 04/29/2022    INR 0.97 04/29/2022    APTT 29.4 04/29/2022       HCG (If Applicable): No results found for: PREGTESTUR, PREGSERUM, HCG, HCGQUANT     ABGs: No results found for: PHART, PO2ART, FGY0YRF, IHN2MYE, BEART, Y2XANBEF     Type & Screen (If Applicable):  No results found for: LABABO, LABRH    Drug/Infectious Status (If Applicable):  No results found for: HIV, HEPCAB    COVID-19 Screening (If Applicable): No results found for: COVID19        Anesthesia Evaluation  Patient summary reviewed and Nursing notes reviewed   history of anesthetic complications: PONV. Airway: Mallampati: II  TM distance: >3 FB   Neck ROM: full  Mouth opening: > = 3 FB Dental: normal exam         Pulmonary:Negative Pulmonary ROS and normal exam  breath sounds clear to auscultation      (-) shortness of breath and not a current smoker          Patient did not smoke on day of surgery. Cardiovascular:    (+) hypertension:,     (-) CAD,  angina and  CHF    NYHA Classification: I  ECG reviewed  Rhythm: regular  Rate: normal           Beta Blocker:  Not on Beta Blocker         Neuro/Psych:   Negative Neuro/Psych ROS  (+) neuromuscular disease:,    (-) seizures, CVA and depression/anxiety            GI/Hepatic/Renal: Neg GI/Hepatic/Renal ROS  (+) GERD:, morbid obesity     (-) hiatal hernia       Endo/Other: Negative Endo/Other ROS             Pt had PAT visit. Abdominal:   (+) obese,     Abdomen: soft. Vascular: Other Findings:             Anesthesia Plan      general     ASA 3     (Back problems will avoid sab.  Due to high risk of ponv, will plan on a multimodal antiemetic regimen. (Scopalamine, emend, pepcid, zofran and perhaps decadron) unless contraindicated in this patient  )  Induction: intravenous. BIS  MIPS: Postoperative opioids intended and Prophylactic antiemetics administered. Anesthetic plan and risks discussed with patient. Use of blood products discussed with patient whom. Plan discussed with CRNA.     Attending anesthesiologist reviewed and agrees with Pre Eval content              Chely Moreno MD   5/12/2022

## 2022-05-12 NOTE — OP NOTE
TOTAL HIP ARTHROPLASTY OPERATIVE NOTE    NAME OF SURGEON / : Loreta Sanchez MD  PATIENT:   Yessi Quan  Date: 5/12/2022        Time: 1:41 PM   Referring Physician: ________________________    PREOP DIAGNOSIS:  left hip  Primary osteoarthritis   POSTOP DIAGNOSIS:  Same     PROCEDURE:    Left    Hip arthroplasty (95293)     IMPLANTS:   Implant Name Type Inv. Item Serial No.  Lot No. LRB No. Used Action   SHELL ACET 3 HOLE 50 MM HIP LOGICAL G-SERIES - [id number]  SHELL ACET 3 HOLE 50 MM HIP LOGICAL G-SERIES  [employer] [id number] Left 1 Implanted   LINER ACET HOODEDXLPE 36MM  52/54 - [id number]  LINER ACET HOODEDXLPE 36MM SZ 52/54  [facility] ORTHOPEDICS [id number] Left 1 Implanted   STEM FEM CLLRD 9 12/14 HIP CMNTLS TAPR COXA VARA ORIGIN - [id number]  STEM FEM CLLRD 9 12/14 HIP CMNTLS TAPR COXA VARA ORIGIN  [facility] ORTHOPEDICS [id number] Left 1 Implanted   HEAD FEM NEUT 36 MM HIP OFFSET FOR FMP SYS BIOLOX DELT CERM - [id number]  HEAD FEM NEUT 36 MM HIP OFFSET FOR FMP SYS BIOLOX DELT [facility] SURGICAL-WD [id number] Left 1 Implanted       FINDINGS: None  ASSISTANT:  Janneth Duvall, certified first assistant. Helped with draping, exposure, retraction, essential steps of the procedure, and with wound closure. ANESTHESIA:  General  EBL:  500 mL  FLUIDS: See anesthesia record  BLOOD PRODUCTS:  None  COMPLICATIONS:  None  SPECIMEN:  None        INDICATIONS:  Patient presents for the above procedure having failed conservative treatment. Patient consents to the procedure above understanding the risks of bleeding, infection, anesthesia, nerve injury, stiffness, and blood clots. Procedure in Detail:    The patient was brought into the operating room, general anesthesia given, [facility] The operative extremity was placed in light traction across a padded perineal post.  An antibiotic was given IV. Ayush Roth   The extremity was prepped with chlorhexidine and alcohol and draped sterilely. Ioband barriers were used. An anterior approach was made to the hip. Careful dissection was carried down to fascia which was longitudinally incised. The tensor muscle was elevated off the medial fascia and a cobra retractor placed around the lateral femoral neck. Greer retractors were used distally between the Sartorious and tensor to expose the vastus aponeurosis. This was released with the bovie to expose the lateral circumflex vessels. These were coagulated and divided with the bovie. A kenyon elevator was used to lift the rectus off the capsule and a cobra retractor placed around the medial femoral neck. A bent anaya retractor was placed over the superior acetabulum. Traction was applied and the capsule was incised by beginning at the superior acetabulum and extending distally to the intertrochanteric line and then dividing medially and laterally. The anterior capsule was excised. The cobra retractors were placed deep to capsule around the neck. The neck was cut with the saw beginning laterally at the neck-trochanter junction and completed medially 5 mm proximal to the intertrochanteric line. A second saw cut was made parallel to the first 10 mm proximal to the first cut. The ring of neck bone was removed with a kocher clamp and the head removed with a corkscrew. Traction was released and the leg was externally rotated 120 degrees. A anaya retractor was placed over the lesser trochanter and the medial hip capsule was released off the inferior femoral neck with a bovie and kenyon elevator. Gentle traction was applied and cobra retractors were placed around the acetabulum. The inferior capsule was released and the labrum and foveal were excised. The socket was reamed with a reamer 2 sizes smaller than the head diameter and continued with increasing reamer size until good subchondral bone was found.   A maximized view of the pelvis was obtained with the c-arm and the acetabular component was impacted in place at 45 degrees of abduction and 20 degrees of anteversion. The liner was snapped in place. The lift hook for the [place] bed was placed around the proximal femur and the leg was externally rotated 120 degrees and the hip was extended and adducted. Bent anaya retractors were placed medial to the femoral neck and proximal to the greater trochanter. The lift hook was raised and the lateral capsule was released off the medial surface of the greater trochanter and lateral neck. A cookie cutter was used to remove metaphyseal bone. A rat tail rasp was used to work the lateral bone. The femoral canal was broached from a size 8 up to a size where the broach was axially and rotationally stable. The anteversion of the broach was 10 degrees. The canal was reamed by 1 mm increments to 1 mm more than the last broach number 0 . A calcar planer was used to remove minimal neck bone. The trial neck and head were placed and the hip was reduced. C-arm showed that the trial filled the canal well and that the leg lengths were even. Offset was similar to the opposite hip. The hip was stable when the leg was externally rotated 90 degrees, the hip extended 20 degrees and an anterior pull applied to the neck. The hip was dislocated and the trial femoral component removed. The actual stem was impacted to the same position and orientation as the trial.  The trunion was cleaned and the chosen head was impacted in place. The hip was reduced. The hip space was filled with diluted, warm betadine. C-arm showed good position of the implants and no fractures. The wound was checked for bleeding and pulse lavaged with antibiotic irrigation. The capsule was closed with 0 vicryl running suture. The tissues were injected with 40 cc of bupivacaine.   The fascia was closed with running 0 vicryl, the subcutaneous layer with 2-0 vicryl and the skin closed with running 3-0 vicryl and prineo. A sterile dressing was placed. The patient was awakened, extubated and transferred to recovery in stable condition. ITERATIONS   Neck Length (mm) Offset: Other Stable Ant?  Leg Length difference  (mm) Stable Post?   0   Henderson nasim  Lip at 1 oclock yes -2    0   Henderson nasim  Lip 1 oclock yes = Actual stem 2 mm proud                                       Electronically signed by Stephen Olson MD on 5/12/2022 at 1:41 PM

## 2022-05-12 NOTE — ANESTHESIA POSTPROCEDURE EVALUATION
Department of Anesthesiology  Postprocedure Note    Patient: Natalee Sanchez  MRN: [id number]  YOB: 1970  Date of evaluation: 5/12/2022  Time:  2:02 PM     Procedure Summary     Date: 05/12/22 Room / Location: [facility] OR [address] [place] [address]    Anesthesia Start: 9032 Anesthesia Stop: 2917    Procedure: LEFT HIP TOTAL ARTHROPLASTY (Left Hip) Diagnosis: (M16.12)    Surgeons: Sapphire Lima MD Responsible Provider: JAEL Rose CRNA    Anesthesia Type: general ASA Status: 3          Anesthesia Type: No value filed. Jhonatan Phase I: Jhonatan Score: 10    Jhonatan Phase II:      Last vitals: Reviewed and per EMR flowsheets.        Anesthesia Post Evaluation    Patient location during evaluation: PACU  Patient participation: complete - patient participated  Level of consciousness: sleepy but conscious  Pain score: 0  Airway patency: patent  Nausea & Vomiting: no nausea and no vomiting  Complications: no  Cardiovascular status: hemodynamically stable  Respiratory status: acceptable  Hydration status: stable

## 2022-05-13 ENCOUNTER — TELEPHONE (OUTPATIENT)
Dept: FAMILY MEDICINE CLINIC | Age: 52
End: 2022-05-13

## 2022-05-13 VITALS
TEMPERATURE: 98.2 F | HEART RATE: 96 BPM | OXYGEN SATURATION: 89 % | RESPIRATION RATE: 18 BRPM | WEIGHT: 220 LBS | SYSTOLIC BLOOD PRESSURE: 112 MMHG | DIASTOLIC BLOOD PRESSURE: 70 MMHG | HEIGHT: 64 IN | BODY MASS INDEX: 37.56 KG/M2

## 2022-05-13 LAB
ANION GAP SERPL CALCULATED.3IONS-SCNC: 9 MMOL/L (ref 7–19)
BUN BLDV-MCNC: 9 MG/DL (ref 6–20)
CALCIUM SERPL-MCNC: 8.2 MG/DL (ref 8.6–10)
CHLORIDE BLD-SCNC: 100 MMOL/L (ref 98–111)
CO2: 30 MMOL/L (ref 22–29)
CREAT SERPL-MCNC: 0.6 MG/DL (ref 0.5–0.9)
GFR AFRICAN AMERICAN: >59
GFR NON-AFRICAN AMERICAN: >60
GLUCOSE BLD-MCNC: 98 MG/DL (ref 74–109)
HCT VFR BLD CALC: 35.3 % (ref 37–47)
HEMOGLOBIN: 11.1 G/DL (ref 12–16)
POTASSIUM REFLEX MAGNESIUM: 3.9 MMOL/L (ref 3.5–5)
SODIUM BLD-SCNC: 139 MMOL/L (ref 136–145)

## 2022-05-13 PROCEDURE — 36415 COLL VENOUS BLD VENIPUNCTURE: CPT

## 2022-05-13 PROCEDURE — G0378 HOSPITAL OBSERVATION PER HR: HCPCS

## 2022-05-13 PROCEDURE — 97161 PT EVAL LOW COMPLEX 20 MIN: CPT

## 2022-05-13 PROCEDURE — 85018 HEMOGLOBIN: CPT

## 2022-05-13 PROCEDURE — 80048 BASIC METABOLIC PNL TOTAL CA: CPT

## 2022-05-13 PROCEDURE — 2700000000 HC OXYGEN THERAPY PER DAY

## 2022-05-13 PROCEDURE — 97165 OT EVAL LOW COMPLEX 30 MIN: CPT

## 2022-05-13 PROCEDURE — 85014 HEMATOCRIT: CPT

## 2022-05-13 PROCEDURE — 6370000000 HC RX 637 (ALT 250 FOR IP): Performed by: ORTHOPAEDIC SURGERY

## 2022-05-13 PROCEDURE — 1210000000 HC MED SURG R&B

## 2022-05-13 PROCEDURE — 6360000002 HC RX W HCPCS: Performed by: ORTHOPAEDIC SURGERY

## 2022-05-13 PROCEDURE — 97535 SELF CARE MNGMENT TRAINING: CPT

## 2022-05-13 PROCEDURE — 97116 GAIT TRAINING THERAPY: CPT

## 2022-05-13 RX ORDER — IBUPROFEN 400 MG/1
400 TABLET ORAL EVERY 8 HOURS PRN
Qty: 30 TABLET | Refills: 0 | Status: SHIPPED | OUTPATIENT
Start: 2022-05-13 | End: 2022-06-14

## 2022-05-13 RX ORDER — OXYCODONE HYDROCHLORIDE 5 MG/1
5 TABLET ORAL EVERY 4 HOURS PRN
Qty: 30 TABLET | Refills: 0 | Status: SHIPPED | OUTPATIENT
Start: 2022-05-13 | End: 2022-05-16

## 2022-05-13 RX ORDER — ASPIRIN 81 MG/1
81 TABLET ORAL 2 TIMES DAILY
Qty: 56 TABLET | Refills: 0 | Status: SHIPPED | OUTPATIENT
Start: 2022-05-13 | End: 2022-06-14

## 2022-05-13 RX ADMIN — Medication 1000 UNITS: at 10:02

## 2022-05-13 RX ADMIN — MULTIPLE VITAMINS W/ MINERALS TAB 1 TABLET: TAB at 10:02

## 2022-05-13 RX ADMIN — ACETAMINOPHEN 650 MG: 325 TABLET ORAL at 03:06

## 2022-05-13 RX ADMIN — ASPIRIN 325 MG: 325 TABLET, COATED ORAL at 10:02

## 2022-05-13 RX ADMIN — OXYCODONE 10 MG: 5 TABLET ORAL at 07:37

## 2022-05-13 RX ADMIN — CLOBETASOL PROPIONATE: 0.5 CREAM TOPICAL at 10:02

## 2022-05-13 RX ADMIN — DULOXETINE HYDROCHLORIDE 30 MG: 30 CAPSULE, DELAYED RELEASE ORAL at 10:08

## 2022-05-13 RX ADMIN — PANTOPRAZOLE SODIUM 40 MG: 40 TABLET, DELAYED RELEASE ORAL at 05:30

## 2022-05-13 RX ADMIN — OXYCODONE 10 MG: 5 TABLET ORAL at 13:23

## 2022-05-13 RX ADMIN — OXYCODONE 10 MG: 5 TABLET ORAL at 03:07

## 2022-05-13 RX ADMIN — Medication 2000 MG: at 04:15

## 2022-05-13 RX ADMIN — ACETAMINOPHEN 650 MG: 325 TABLET ORAL at 10:08

## 2022-05-13 ASSESSMENT — PAIN - FUNCTIONAL ASSESSMENT
PAIN_FUNCTIONAL_ASSESSMENT: ACTIVITIES ARE NOT PREVENTED
PAIN_FUNCTIONAL_ASSESSMENT: ACTIVITIES ARE NOT PREVENTED

## 2022-05-13 ASSESSMENT — PAIN DESCRIPTION - ORIENTATION
ORIENTATION: LEFT
ORIENTATION: LEFT

## 2022-05-13 ASSESSMENT — PAIN DESCRIPTION - LOCATION
LOCATION: HIP
LOCATION: HIP

## 2022-05-13 ASSESSMENT — PAIN SCALES - GENERAL
PAINLEVEL_OUTOF10: 10
PAINLEVEL_OUTOF10: 8
PAINLEVEL_OUTOF10: 4
PAINLEVEL_OUTOF10: 10
PAINLEVEL_OUTOF10: 10

## 2022-05-13 ASSESSMENT — PAIN DESCRIPTION - DESCRIPTORS
DESCRIPTORS: ACHING
DESCRIPTORS: ACHING

## 2022-05-13 ASSESSMENT — PAIN DESCRIPTION - PAIN TYPE: TYPE: SURGICAL PAIN

## 2022-05-13 ASSESSMENT — PAIN DESCRIPTION - FREQUENCY: FREQUENCY: CONTINUOUS

## 2022-05-13 NOTE — PROGRESS NOTES
Patient discharged home today with [facility]. Went over all discharge instructions and new medications with patient and provided a copy of all new medications to take home. Patient verbalized understanding. Patient was stable upon discharge.   Electronically signed by Charlott Sicard, RN on 5/13/2022 at 3:05 PM

## 2022-05-13 NOTE — PROGRESS NOTES
Physical Therapy  Name: Isamar Brown  MRN:  [id number]  Date of service:  5/13/2022 05/13/22 1400   Restrictions/Precautions   Restrictions/Precautions Fall Risk;Weight Bearing   Lower Extremity Weight Bearing Restrictions   Left Lower Extremity Weight Bearing Weight Bearing As Tolerated   Position Activity Restriction   Other position/activity restrictions ANTERIOR HIP PRECAUTIONS   Subjective   Subjective Pt agreed to therapy. Pain Assessment   Pain Assessment 0-10   Pain Level 10  (reports she has already received pain meds)   Pain Location Hip   Pain Orientation Left   Pain Descriptors Aching   Functional Pain Assessment Activities are not prevented   Pain Type Surgical pain   Pain Frequency Continuous   Non-Pharmaceutical Pain Intervention(s) Ambulation/Increased Activity;Repositioned; Rest   Response to Pain Intervention Patient satisfied   Bed Mobility   Supine to Sit Stand by assistance   Sit to Supine Minimal assistance   Transfers   Sit to Stand Contact guard assistance   Stand to sit Contact guard assistance   Ambulation   WB Status FWB   Ambulation   Device Rolling Walker   Assistance Contact guard assistance   Gait Deviations Decreased step length;Decreased step height   Distance 30'   Long Term Goals   Time Frame for Long term goals  14 DAYS   Long term goal 1 BED MOB INDEPENDENT   Long term goal 2 TRANSFERS INDEPENDENT   Long term goal 3 ' RW SUP-IND   Long term goal 4 UP/DOWN 3 STEPS CGA   Conditions Requiring Skilled Therapeutic Intervention   Body Structures, Functions, Activity Limitations Requiring Skilled Therapeutic Intervention Decreased functional mobility ; Decreased ADL status; Decreased ROM; Decreased strength;Decreased balance; Increased pain   Assessment Pt able to amb with steady gait. Pt stated she did not have stairs at her home. Pt back to bed with all needs in reach.     Activity Tolerance   Activity Tolerance Patient tolerated treatment well   PT Plan of Care   Friday X Safety Devices   Type of Devices Call light within reach; Left in bed     Electronically signed by Jose Antonio Menezes PTA on 5/13/2022 at 2:06 PM

## 2022-05-13 NOTE — PROGRESS NOTES
Patient discharged home today with [facility]. Facility notified of patient's discharge and all documents were faxed. P ; F .   Electronically signed by Abby Collins RN on 5/13/2022 at 3:06 PM

## 2022-05-13 NOTE — PROGRESS NOTES
Subjective:     Post-Operative Day: 1 No complaints    Objective:     Patient Vitals for the past 24 hrs:   BP Temp Temp src Pulse Resp SpO2   05/13/22 1139 112/70 98.2 °F (36.8 °C) Temporal 96 18 (!) 89 %   05/13/22 0751 105/65 98.1 °F (36.7 °C) Temporal 90 18 93 %   05/13/22 0607 (!) 115/58 97.9 °F (36.6 °C) Temporal 85 16 96 %   05/13/22 0303 120/76 -- -- -- -- --   05/13/22 0017 98/62 -- -- -- -- --   05/13/22 0005 (!) 85/57 96.4 °F (35.8 °C) Temporal 71 -- --   05/12/22 2244 -- -- -- -- 18 --   05/12/22 2139 -- -- -- -- 18 --   05/12/22 2106 123/65 96.8 °F (36 °C) Temporal 92 18 98 %   05/12/22 1833 116/75 97 °F (36.1 °C) Temporal 87 16 96 %   05/12/22 1829 -- -- -- -- 16 --   05/12/22 1759 -- -- -- -- 18 --   05/12/22 1620 113/69 96.5 °F (35.8 °C) Temporal 70 16 98 %   05/12/22 1515 120/84 96.5 °F (35.8 °C) Temporal 68 16 99 %   05/12/22 1500 127/83 -- -- 100 18 92 %   05/12/22 1455 131/81 -- -- 78 14 93 %   05/12/22 1450 127/89 98.1 °F (36.7 °C) -- 77 12 96 %   05/12/22 1445 130/74 -- -- 80 16 98 %       General: Alert cooperative   Wound: Clean dry intact. Moderate swelling   Neurovascular: Exam normal   DVT Exam: negative         Data Review:  Recent Labs     05/13/22  0409   HGB 11.1*     Recent Labs     05/13/22  0409      K 3.9   CREATININE 0.6   GLUCOSE 98     No results for input(s): POCGLU in the last 72 hours. XR HIP 2-3 VW W PELVIS LEFT   Final Result   1. Normal bilateral hip arthroplasty. No hardware complication. Signed by Dr Freddy Collier    (Results Pending)         Assessment:     Status Post left Total Hip Arthroplasty. Doing well postop without complications .    Plan:     Pain control  Keep accuchecks under 200  PT/OT  DVT prophylaxis  Ice and elevate  Discharge Home today

## 2022-05-13 NOTE — CONSULTS
Referring Provider: Dr. Miller Green  Reason for Consultation: Medical management    Patient Care Team:  JAEL Steele as PCP - General (Family Nurse Practitioner)  [facility]  Dandy Quintanilla MD as Consulting Physician (Neurosurgery)    Chief complaint hip pain    Subjective . History of present illness: The patient presents to the orthopedic service for JEANNE. They have failed outpatient conservative treatment of NSAIDS, muscle relaxer, physical therapy and opioid pain meds. The pain is affecting their activities of daily living and they have chosen to undergo surgical correction. We have been asked by the attending physician to provide medical consultation in the anton-operative phase. the patient understands our role in their healthcare during this hospitalization. All questions were encouraged and answered to the best of our ability. The postoperative pain is as expected. There are no other participating or relieving factors noted.       REVIEW OF SYSTEMS:    CONSTITUTIONAL:  Negative for anorexia, chills, fevers, night sweats and weight loss  EYES:  negative for eye dryness, icterus and redness  HEENT:   negative for dental problems, epistaxis, facial trauma and thrush  RESPIRATORY:  negative for chest tightness, cough, dyspnea on exertion, pneumonia and sputum  CARDIOVASCULAR: negative for chest pain, dyspnea, exertional chest pressure/discomfort, irregular heart beat, palpitations, paroxysmal nocturnal dyspnea and syncope  GASTROINTESTINAL:  negative for abdominal pain, hematemesis, jaundice, melena and rectal bleeding  MUSCULOSKELETAL:  negative for muscle weakness, myalgias and neck pain, chronic joint pain noted  NEUROLOGICAL:   negative for dizziness, headaches, seizures, speech problems, tremors and vertigo  INTEGUMENT: negative for pruritus, rash, skin color change and skin lesion(s)   A Full 14 point review of systems is negative outside those listed above and in the HPI      History    Past Medical History:   Diagnosis Date    Abnormal Pap smear of cervix     GERD (gastroesophageal reflux disease)     Hip pain     Hyperlipidemia     Hypertension     Patient thinks she has HTN but untreated    Nephrolithiasis     Primary osteoarthritis of right hip 12/3/2019    Psoriasis      Past Surgical History:   Procedure Laterality Date    CHOLECYSTECTOMY      HYSTERECTOMY      HYSTERECTOMY, VAGINAL      No BSO    LITHOTRIPSY      TOTAL HIP ARTHROPLASTY Right 2020    HIP TOTAL ARTHROPLASTY ANTERIOR APPROACH performed by Patrick Ol[facility]a MD at [facility] History   Problem Relation Age of Onset    Breast Cancer Paternal Grandmother     Hypertension Mother     High Cholesterol Mother     Hypertension Father     Other Father         GERD, nephrolithiasis    High Cholesterol Father     Diabetes type 2  Father     Coronary Art Dis Father     High Blood Pressure Sister     Other Sister         GERD     Social History     Tobacco Use    Smoking status: Former Smoker     Packs/day: 1.00     Years: 30.00     Pack years: 30.00     Types: Cigarettes     Quit date: 2019     Years since quittin.4    Smokeless tobacco: Never Used   Vaping Use    Vaping Use: Never used   Substance Use Topics    Alcohol use: Not Currently    Drug use: Never     Medications Prior to Admission: vitamin D (D3-1000) 25 MCG (1000 UT) TABS tablet, Take 1,000 Units by mouth daily  [START ON 2022] HYDROcodone-acetaminophen (NORCO) 7.5-325 MG per tablet, Take 1 tablet by mouth every 8 hours as needed for Pain for up to 30 days. (may fill 22)  omeprazole (PRILOSEC) 20 MG delayed release capsule, Take 1 capsule by mouth every morning (before breakfast)  DULoxetine (CYMBALTA) 30 MG extended release capsule, Take 1 capsule by mouth daily  clobetasol propionate 0.05 % LOTN lotion, Apply to affected area twice daily for psoriasis.   Avoid use on face. [DISCONTINUED] vitamin D (ERGOCALCIFEROL) 1.25 MG (56954 UT) CAPS capsule, TAKE 1 CAPSULE BY MOUTH ONCE WEEKLY  Multiple Vitamins-Minerals (THERAPEUTIC MULTIVITAMIN-MINERALS) tablet, Take 1 tablet by mouth daily  tiZANidine (ZANAFLEX) 4 MG tablet, 1/4 tablet with meals 1/2 to whole tablet at bedtime  Patient has no known allergies. Objective     Vital Signs   All pre-op and post op vitals reviewed           Physical Exam:  Constitutional: oriented to person, place, and time. appears well-developed. HEENT:   Head: Normocephalic and atraumatic. Eyes: Pupils are equal, round, and reactive to light. Neck: Neck supple. Cardiovascular: Regular rhythm and normal heart sounds. No lift or rub appreciated. Pulmonary/Chest: Effort normal and breath sounds normal. CTAB. No labored breating. Abdominal: Soft. Bowel sounds are normal. There is no appreciable  distension. There is no point tenderness. no rebounding or guarding. Musculoskeletal: Normal range of motion on other than surgically repaired joint . no edema or tenderness other than surgical area. Post-op changes noted  Neurological:  alert and oriented to person, place, and time. normal reflexes. No focal deficits  Skin: Skin is warm and dry. No new rashes appreciated. Results Review:   I reviewed the patient's new imaging results and agree with the interpretation. Principal Problem:    Primary osteoarthritis of left hip  Active Problems:    GERD (gastroesophageal reflux disease)    Familial hyperlipidemia    Cigarette nicotine dependence without complication    Vitamin D deficiency  Resolved Problems:    * No resolved hospital problems. *      COPD /EMPHYSEMA- described as panlobar. Recommend discontinuation of all tobacco products including vape devices. Instruct patient on proper incentive spirometry usage. Recommend early mobilization. Add Duo Neds QID as needed. Add Mucinex as a mucolytic if secretion require thinning to clear. Tobacco cessation education /counseling of disease process provided. GERD-exacerbated by pain meds and anesthesia, will add PPI or H2 blocker as needed and can step up to Carafate 1 gm po q AC and qhs. Hypertension-review pre and post BP's,will restart home BP meds when BP allows. Add Clonidine 0.1 mg q 4 hours prn if bp> 140/90,monitor BP and adjust meds as necessary. We were asked to provide medical consultation by the attending physician during the perioperative phase. They will return to their primary care provider and have been instructed to follow up with them concerning abnormal lab/x-rays. Questions were encouraged and answered to the best of our ability. We will be available for 30 days or until they return to their primary care provider, if they return to the emergency room in the next 30 days with a anton-operative issue we will gladly admit them. Otherwise, they will be admitted to the hospitalist service. All questions and concerns addressed prior to discharge.      I discussed the patients findings and my recommendations with patient/family, staff and the Orthopaedic team.  Thom Dye MD  05/13/22  6:43 AM

## 2022-05-13 NOTE — PROGRESS NOTES
Occupational Therapy  Facility/Department: [facility] [address] [facility] Initial Assessment    Name: Aviva Mendoza  : 1970  MRN: [id number]  Date of Service: 2022    Discharge Recommendations:             Patient Diagnosis(es): There were no encounter diagnoses. Past Medical History:  has a past medical history of Abnormal Pap smear of cervix, GERD (gastroesophageal reflux disease), Hip pain, Hyperlipidemia, Hypertension, Nephrolithiasis, Primary osteoarthritis of right hip, and Psoriasis. Past Surgical History:  has a past surgical history that includes Hysterectomy, vaginal; Cholecystectomy; Lithotripsy; Hysterectomy; and Total hip arthroplasty (Right, 2020). Treatment Diagnosis: L THR      Assessment   Performance deficits / Impairments: Decreased functional mobility ; Decreased ADL status; Decreased balance  Assessment: Will progress as tolerated  Treatment Diagnosis: L THR  Prognosis: Good  Decision Making: Low Complexity  REQUIRES OT FOLLOW-UP: Yes  Activity Tolerance  Activity Tolerance: Patient Tolerated treatment well        Plan   Plan  Times per Week: 3-5x/week  Times per Day: Daily     Restrictions  Restrictions/Precautions  Restrictions/Precautions: ROM Restrictions    Subjective   General  Chart Reviewed: Yes  Patient assessed for rehabilitation services?: Yes  Family / Caregiver Present: Yes  Diagnosis: L THR     Social/Functional History  Social/Functional History  Lives With: Spouse  Type of Home: House  Home Layout: One level  Bathroom Toilet: Standard  ADL Assistance: Independent  Ambulation Assistance: Independent  Transfer Assistance: Independent       Objective   Pulse: 90  Heart Rate Source: Monitor  BP: 105/65  BP Location: Right lower arm  BP Method: Automatic  Patient Position: Sitting  MAP (Calculated): 78.33  Resp: 18  SpO2: 93 %  O2 Device: None (Room air)  Hearing: Within functional limits                   AROM: Within functional limits  Strength: Within functional limits  Coordination: Within functional limits  ADL  Feeding: Independent  Grooming: Independent  UE Bathing: Supervision  LE Bathing: Minimal assistance  UE Dressing: Supervision  LE Dressing: Minimal assistance  Toileting: Contact guard assistance        Bed mobility  Supine to Sit: Supervision  Transfers  Stand Step Transfers: Contact guard assistance  Sit to stand: Stand by assistance  Transfer Comments: RW     Cognition  Overall Cognitive Status: WNL                                           G-Code     OutComes Score                                                  AM-PAC Score             Goals  Short Term Goals  Time Frame for Short term goals: 1 week  Short Term Goal 1: Supervision with toilet tfers  Short Term Goal 2: Supervision with clothing mgmt  Short Term Goal 3: Supervision donning underwear up to knee level  Long Term Goals  Long Term Goal 1: Return to PLOF       Therapy Time   Individual Concurrent Group Co-treatment   Time In           Time Out           Minutes                   Liza Madrigal, OT

## 2022-05-13 NOTE — PROGRESS NOTES
Physical Therapy  Facility/Department: [facility] SURG SERVICES  Physical Therapy Initial Assessment    Name: Apple Levin  : 1970  MRN: [id number]  Date of Service: 2022    Discharge Recommendations:  Continue to assess pending progress,24 hour supervision or assist          Patient Diagnosis(es): There were no encounter diagnoses. Past Medical History:  has a past medical history of Abnormal Pap smear of cervix, GERD (gastroesophageal reflux disease), Hip pain, Hyperlipidemia, Hypertension, Nephrolithiasis, Primary osteoarthritis of right hip, and Psoriasis. Past Surgical History:  has a past surgical history that includes Hysterectomy, vaginal; Cholecystectomy; Lithotripsy; Hysterectomy; and Total hip arthroplasty (Right, 2020). Assessment   Body Structures, Functions, Activity Limitations Requiring Skilled Therapeutic Intervention: Decreased functional mobility ; Decreased ADL status; Decreased ROM; Decreased strength;Decreased balance; Increased pain  Assessment: Pt ABLE TO AMB IN ROOM THEN TO BR USING RW. DOING WELL OVERALL. MAY D/C HOME TODAY. WILL BE SAFE TO RETURN HOME WITH SUPERVISION.   Requires PT Follow-Up: Yes  Activity Tolerance  Activity Tolerance: Patient tolerated evaluation without incident     Plan   Plan  Plan: 1 time a day 7 days a week  Current Treatment Recommendations: Balance training,Functional mobility training,Transfer training,Gait training,Stair training,Safety education & training,Patient/Caregiver education & training  Safety Devices  Type of Devices: Call light within reach     Restrictions  Restrictions/Precautions  Restrictions/Precautions: Fall Risk,Weight Bearing  Lower Extremity Weight Bearing Restrictions  Left Lower Extremity Weight Bearing: Weight Bearing As Tolerated  Position Activity Restriction  Other position/activity restrictions: ANTERIOR HIP PRECAUTIONS     Subjective   Pain: Pt REPORTS MINIMAL PAIN L HIP WITH AMBULATION  General  Patient assessed for rehabilitation services?: Yes  Diagnosis: L THR  Subjective  Subjective: Pt READY TO GET OOB         Social/Functional History  Social/Functional History  Lives With: Spouse  Type of Home: House  Home Layout: One level  Bathroom Toilet: Standard  ADL Assistance: Independent  Ambulation Assistance: Independent  Transfer Assistance: Independent  Vision/Hearing       Cognition         Objective   Pulse: 90  Heart Rate Source: Monitor  BP: 105/65  BP Location: Right lower arm  BP Method: Automatic  Patient Position: Sitting  MAP (Calculated): 78.33  Resp: 18  SpO2: 93 %  O2 Device: None (Room air)        Gross Assessment  AROM: Generally decreased, functional (L HIP DEC OVERALL DUE TO DISCOMFORT)  Strength: Generally decreased, functional (HIP < 3/5)                    Bed mobility  Supine to Sit: Supervision  Transfers  Sit to Stand: Contact guard assistance  Stand to sit: Contact guard assistance  Bed to Chair: Contact guard assistance  Ambulation  WB Status: FWB  Ambulation  Device: Rolling Walker  Assistance: Contact guard assistance  Quality of Gait: STEP-TO PATTERN, GOOD WEIGHTBEARING  Gait Deviations: Decreased step length;Decreased step height  Distance: 10' X 2     Balance  Sitting - Dynamic: Good  Standing - Dynamic: Fair;+           OutComes Score                                                  AM-PAC Score             Goals  Long Term Goals  Time Frame for Long term goals : 14 DAYS  Long term goal 1: BED MOB INDEPENDENT  Long term goal 2: TRANSFERS INDEPENDENT  Long term goal 3: ' RW SUP-IND  Long term goal 4: UP/DOWN 3 STEPS CGA       Education  Patient Education  Education Given To: Patient; Family  Education Provided: Role of Therapy;Plan of Care;Precautions      Therapy Time   Individual Concurrent Group Co-treatment   Time In           Time Out           Minutes                   Flores Lu PT

## 2022-05-13 NOTE — DISCHARGE SUMMARY
[facility]  Dr. Marbella Rosales  Discharge Summary      The Anirudh Reese is a 49 y.o. female underwent total hip replacement procedure without complication. Anirudh Reese was admitted to the floor following their recovery in the PACU. Discharge Diagnosis  left Hip Replacement    Current Inpatient Medications    Current Facility-Administered Medications: therapeutic multivitamin-minerals 1 tablet, 1 tablet, Oral, Daily  tiZANidine (ZANAFLEX) tablet 4 mg, 4 mg, Oral, Q8H PRN  DULoxetine (CYMBALTA) extended release capsule 30 mg, 30 mg, Oral, Daily  vitamin D (CHOLECALCIFEROL) tablet 1,000 Units, 1,000 Units, Oral, Daily  pantoprazole (PROTONIX) tablet 40 mg, 40 mg, Oral, QAM AC  sodium chloride flush 0.9 % injection 5-40 mL, 5-40 mL, IntraVENous, 2 times per day  sodium chloride flush 0.9 % injection 5-40 mL, 5-40 mL, IntraVENous, PRN  0.9 % sodium chloride infusion, , IntraVENous, PRN  acetaminophen (TYLENOL) tablet 650 mg, 650 mg, Oral, Q6H  oxyCODONE (ROXICODONE) immediate release tablet 5 mg, 5 mg, Oral, Q4H PRN **OR** oxyCODONE (ROXICODONE) immediate release tablet 10 mg, 10 mg, Oral, Q4H PRN  morphine (PF) injection 2 mg, 2 mg, IntraVENous, Q2H PRN **OR** morphine sulfate (PF) injection 4 mg, 4 mg, IntraVENous, Q2H PRN  aspirin EC tablet 325 mg, 325 mg, Oral, BID  clobetasol (TEMOVATE) 0.05 % cream, , Topical, BID    Post-operatively the patients diet was advanced as tolerated and their incision was checked on POD #1. The incision is was clean, dry and intact with no signs of infection. The patient remained neurovascularly intact in the lower extremity and had intact pulses distally. Patients calf remained soft and showed no evidence of DVT. The patient was able to move their leg and ankle/foot without any problems post-operatively. Physical therapy and occupational therapy were consulted and began working with the patient post-operatively.   The patient progressed with PT/OT as would be expected and continued to improve through their stay. The patients pain was initially controlled with IV medications but we were able to transition to oral pain medications soon after arrival to the floor and their pain remained under good control through their hospital stay. From a medical standpoint the patient remained stable and continued to have the medicine team follow throughout their stay. Acute postoperative blood loss anemia after joint replacement being monitored with daily hemoglobin/hematocrit. The patients dressing was changed/incision was checked on day of d/c. The patient will be discharged at this time to  Home with their current diet restrictions and will continue to follow the hip precautions outlined to them by us and PT/OT. Condition on Discharge: Stable    Plan  Followup at scheduled appointment time (1 month post-op). Patient was instructed on the use of pain medications, the signs and symptoms of infection, and was given our number to call should they have any questions or concerns following discharge.

## 2022-05-13 NOTE — CARE COORDINATION
Date / Time of Evaluation: 5/13/2022 11:34 AM  Assessment Completed by: Kayla Abdalla, RN, BSN    Patient Admission Status:     [address]    [phone] (home)   Telephone Information:   Mobile [phone]       (Best Practice:  Have patient / caregiver verify above address and phone number by stating out loud their current address and reachable phone number.)  Is above information correct? yes      Current PCP:  JAEL Watson    Initial Assessment Completed at bedside with:  patient    Emergency Contacts:  Extended Emergency Contact Information  Primary Emergency Contact: [facility] of [address] Phone: [phone]  Mobile Phone: [phone]  Relation: Spouse   needed? No    Advance Directives: Code Status:  Full Code    Financial:  Payor: Regina Mckinnon vd / Plan: Elana Balloon / Product Type: *No Product type* /     Pre-Cert required for SNF:  yes    Pharmacy:   [id number], [address] [phone] - F [phone]  [address] [facility] [address]  Phone: [phone] Fax: [address] [phone] Virgil Lockhart [phone]  [address]  [facility] [address]  Phone: [phone] Fax: [phone]      Potential assistance purchasing medications? No    ADLS:  Support System:  Spouse/Significant Other    Current Home Environment:  house  Steps:  Yes    Plans to RETURN to current housing: Yes  Barriers to RETURNING to current housing:  No    Currently ACTIVE with Home Health CARE:  No  [address]:  N/A    DME Provider:  N/A     Transition Plan:  Home with family support and [facility] for Discharge:  spouse    Additional CM/SW Notes:   I spoke with patient at bedside. She states that she lives at home with her spouse and she has 1 little step inside from den to kitchen and BR.  She states that she has a rolling walker and bedside commode at home. She states that [facility] will be seeing her upon discharge. No other needs identified. Will continue to follow.     Dale Schlatter, RN, BSN  [facility]  Care Management Department  Ph:  [phone] Fax: [phone]  Electronically signed by Dale Schlatter, RN, BSN on 5/13/2022 at 11:38 AM

## 2022-05-13 NOTE — PROGRESS NOTES
CLINICAL PHARMACY NOTE: MEDS TO BEDS    Total # of Prescriptions Filled: 3   The following medications were delivered to the patient:  · Aspirin 81 mg  · Ibuprofen 400 mg  · Oxycodone 5 mg    Additional Documentation:    Handed scripts to patients  and patient was alert as well at bedside

## 2022-05-13 NOTE — TELEPHONE ENCOUNTER
It looks like she had hip replacement surgery. She will need to be scheduled for a hospital f/u appt when she is discharged.

## 2022-05-13 NOTE — TELEPHONE ENCOUNTER
Patient wanted to let you know she is in the hospital.   Veronica Simona something is up with her hip. Still admitted.

## 2022-05-16 ENCOUNTER — TELEPHONE (OUTPATIENT)
Dept: INPATIENT UNIT | Age: 52
End: 2022-05-16

## 2022-05-16 ENCOUNTER — TELEPHONE (OUTPATIENT)
Dept: INTERNAL MEDICINE | Age: 52
End: 2022-05-16

## 2022-05-16 NOTE — TELEPHONE ENCOUNTER
Aily[facility] Transitions Initial Follow Up Call    Outreach made within 2 business days of discharge: Yes    Patient: Tatum Oneill   Patient : 1970 MRN: [id number]    Reason for Admission: Left hip pain  Discharge Date: 22    Discharge Diagnosis  left Hip Replacement     Spoke with: Patient    Discharge department/facility: [facility]    TCM Interactive Patient Contact:  Was patient able to fill all prescriptions: Yes  Was patient instructed to bring all medications to the follow-up visit: Yes  Is patient taking all medications as directed in the discharge summary? Yes  Does patient understand their discharge instructions: Yes  Does patient have questions or concerns that need addressed prior to 7-14 day follow up office visit: no    Spoke to the patient she is doing ok, she is going to have home care and physical therapy at home. Pt states pain medications is helping. She can not ride in a car for 2 weeks, pt did agree to do a VV. At this time she does not need anything from us, she is eating well and has started having BMs again. She will let us know her medications during the VV.     Scheduled appointment with PCP within 7-14 days    Follow Up  Future Appointments   Date Time Provider Nick Florence   2022  3:00 PM Leroy Snider, APRN - CNP [facility]       [place], [place]

## 2022-05-16 NOTE — TELEPHONE ENCOUNTER
She was admitted last week. Looks like she has since been discharged. Could you do the TCM please.  Thank you

## 2022-05-18 ENCOUNTER — TELEPHONE (OUTPATIENT)
Dept: FAMILY MEDICINE CLINIC | Age: 52
End: 2022-05-18

## 2022-05-18 ENCOUNTER — TELEMEDICINE (OUTPATIENT)
Dept: FAMILY MEDICINE CLINIC | Age: 52
End: 2022-05-18
Payer: MEDICAID

## 2022-05-18 DIAGNOSIS — D64.9 POSTOPERATIVE ANEMIA: ICD-10-CM

## 2022-05-18 DIAGNOSIS — Z96.642 S/P TOTAL LEFT HIP ARTHROPLASTY: Primary | ICD-10-CM

## 2022-05-18 DIAGNOSIS — E83.51 HYPOCALCEMIA: ICD-10-CM

## 2022-05-18 DIAGNOSIS — F41.8 DEPRESSION WITH ANXIETY: ICD-10-CM

## 2022-05-18 DIAGNOSIS — E55.9 VITAMIN D DEFICIENCY: ICD-10-CM

## 2022-05-18 PROCEDURE — 99215 OFFICE O/P EST HI 40 MIN: CPT | Performed by: NURSE PRACTITIONER

## 2022-05-18 RX ORDER — DULOXETIN HYDROCHLORIDE 60 MG/1
60 CAPSULE, DELAYED RELEASE ORAL DAILY
Qty: 90 CAPSULE | Refills: 1 | Status: SHIPPED | OUTPATIENT
Start: 2022-05-18

## 2022-05-18 ASSESSMENT — ENCOUNTER SYMPTOMS
SORE THROAT: 0
DIARRHEA: 0
SHORTNESS OF BREATH: 0
COUGH: 0
WHEEZING: 0
ABDOMINAL PAIN: 0
CHEST TIGHTNESS: 0
NAUSEA: 0

## 2022-05-18 NOTE — TELEPHONE ENCOUNTER
Celina, can we have home health draw a cbc, bmp, vit d, and PTH this week if possible? She has also had swelling in the L leg and I wanted to make sure her home health nurse checked that out. I told her to go to ER with any worsening.

## 2022-05-18 NOTE — PROGRESS NOTES
Post-Discharge Transitional Care Management Services    TELEHEALTH EVALUATION -- Audio/Visual (During [id number] public health emergency)      [address]   [address] [address] [address]  Dept: [phone]  Dept Fax: [phone]  Loc: [phone]    Rob New is a 49 y.o. female who presents today for her medical conditions/complaints asnoted below. [facility] is here for No chief complaint on file. Inpatient course: Discharge summary reviewed- see chart for full details. [facility] Risk Score (risk of hospital readmission):Readmission Risk Score: 3.3 ( )    Active Problems:    * No active hospital problems. *      Care management risk score Rising risk (score 2-5) and Complex Care (Scores >=6): 0     Non face to face  following discharge, date last encounter closed (first attempt may havebeen earlier): 5/16/2022 10:33 AM    Call initiated 2 business days of discharge: Yes      HPI andCOURSE SINCE DISCHARGE   CC:  Here today to discuss thefollowing:    VV per my chart for hospital follow-up. Admitted to [facility] 5/12/2022  Discharged 5/13/2022    She underwent left hip JEANNE per Dr. Hunter Rosenbaum with no complications. She is taking ASA 81 mg bid for DVT prophylaxis. Meds at discharge included oxycodone and ibuprofen for pain. Pain is fairly controlled. She is able to walk short distances. She is using a walker. Home health is following, PT to see her tomorrow. Skilled nursing has not seen her yet. She states she has had continued swelling in her left leg. Possibly mildly worse since discharge. No redness or warmth. She has a follow-up with Dr. Hunter Rosenbaum on 6/8. Labs showed mild postoperative anemia, hgb 11.1. Calcium was low, 8.2. She has a hx of vitamin D deficiency, elevated PTH. PTH normalized after vitamin D treatment with ergo. Then started otc 1000 units daily.     She started duloxetine 30 mg in March. Switched from [address]. Tolerating well but feels it is not very effective. Still with depression, anxiety. No worse. No SI or HI. Also started this for chronic pain.     HPI    Past Medical History:   Diagnosis Date    Abnormal Pap smear of cervix     GERD (gastroesophageal reflux disease)     Hip pain     Hyperlipidemia     Hypertension     Patient thinks she has HTN but untreated    Nephrolithiasis     Primary osteoarthritis of right hip 12/3/2019    Psoriasis       Past Surgical History:   Procedure Laterality Date    CHOLECYSTECTOMY      HYSTERECTOMY      HYSTERECTOMY, VAGINAL      No BSO    LITHOTRIPSY      TOTAL HIP ARTHROPLASTY Right 2020    HIP TOTAL ARTHROPLASTY ANTERIOR APPROACH performed by Santos Castillo MD at [address] [facility] Left 2022    LEFT HIP TOTAL ARTHROPLASTY performed by Santos Castillo MD at [address] [facility] History   Problem Relation Age of Onset    Breast Cancer Paternal Grandmother     Hypertension Mother     High Cholesterol Mother     Hypertension Father     Other Father         GERD, nephrolithiasis    High Cholesterol Father     Diabetes type 2  Father     Coronary Art Dis Father     High Blood Pressure Sister     Other Sister         GERD       Social History     Tobacco Use    Smoking status: Former Smoker     Packs/day: 1.00     Years: 30.00     Pack years: 30.00     Types: Cigarettes     Quit date: 2019     Years since quittin.4    Smokeless tobacco: Never Used   Substance Use Topics    Alcohol use: Not Currently        Medications patient taking as of now reconciled against medications ordered at time of hospital discharge     Current Outpatient Medications   Medication Sig Dispense Refill    DULoxetine (CYMBALTA) 60 MG extended release capsule Take 1 capsule by mouth daily 90 capsule 1    aspirin EC 81 MG EC tablet Take 1 tablet by mouth 2 times daily 56 tablet 0    ibuprofen (ADVIL;MOTRIN) 400 MG tablet Take 1 tablet by mouth every 8 hours as needed for Pain 30 tablet 0    vitamin D (D3-1000) 25 MCG (1000 UT) TABS tablet Take 1,000 Units by mouth daily      omeprazole (PRILOSEC) 20 MG delayed release capsule Take 1 capsule by mouth every morning (before breakfast) 90 capsule 2    clobetasol propionate 0.05 % LOTN lotion Apply to affected area twice daily for psoriasis. Avoid use on face. 118 mL 0    Multiple Vitamins-Minerals (THERAPEUTIC MULTIVITAMIN-MINERALS) tablet Take 1 tablet by mouth daily      tiZANidine (ZANAFLEX) 4 MG tablet 1/4 tablet with meals 1/2 to whole tablet at bedtime 60 tablet 2     No current facility-administered medications for this visit. No Known Allergies    Health Maintenance   Topic Date Due    COVID-19 Vaccine (1) Never done    Hepatitis C screen  Never done    DTaP/Tdap/Td vaccine (1 - Tdap) Never done    Colorectal Cancer Screen  Never done    Breast cancer screen  12/31/2021    Shingles vaccine (1 of 2) Never done    Low dose CT lung screening  Never done    Flu vaccine (Season Ended) 10/12/2022 (Originally 9/1/2022)    Depression Monitoring  10/12/2022    Lipids  10/05/2026    Hepatitis A vaccine  Aged Out    Hepatitis B vaccine  Aged Out    Hib vaccine  Aged Out    Meningococcal (ACWY) vaccine  Aged Out    Pneumococcal 0-64 years Vaccine  Aged Out    HIV screen  Discontinued       Subjective:      Review of Systems   Constitutional: Negative for chills and fever. HENT: Negative for congestion, ear pain and sore throat. Respiratory: Negative for cough, chest tightness, shortness of breath and wheezing. Cardiovascular: Positive for leg swelling. Negative for chest pain. Gastrointestinal: Negative for abdominal pain, diarrhea and nausea. Musculoskeletal: Positive for arthralgias. Negative for myalgias. Skin: Negative for rash. Psychiatric/Behavioral: Positive for dysphoric mood. Negative for suicidal ideas.  The patient is nervous/anxious. See HPI for visit specific review of symptoms. All others negative      Objective:   VSS:       [ INSTRUCTIONS:  \"[x]\" Indicates a positive item  \"[]\" Indicates a negative item  -- DELETE ALL ITEMS NOT EXAMINED]  Vital Signs: (As obtained by patient/caregiver or practitioner observation)    Blood pressure-  Heart rate-    Respiratory rate-    Temperature-  Pulse oximetry-     Constitutional: [x] Appears well-developed and well-nourished [x] No apparent distress      [] Abnormal-   Mental status  [x] Alert and awake  [x] Oriented to person/place/time [x]Able to follow commands      Eyes:  EOM    [x]  Normal  [] Abnormal-  Sclera  [x]  Normal  [] Abnormal -         Discharge [x]  None visible  [] Abnormal -    HENT:   [x] Normocephalic, atraumatic.   [] Abnormal   [] Mouth/Throat: Mucous membranes are moist.     External Ears [] Normal  [] Abnormal-     Neck: [x] No visualized mass     Pulmonary/Chest: [x] Respiratory effort normal.  [x] No visualized signs of difficulty breathing or respiratory distress        [] Abnormal-      Musculoskeletal:   [x] Normal gait with no signs of ataxia         [x] Normal range of motion of neck        [] Abnormal-       Neurological:        [x] No Facial Asymmetry (Cranial nerve 7 motor function) (limited exam to video visit)          [x] No gaze palsy        [] Abnormal-         Skin:        [x] No significant exanthematous lesions or discoloration noted on facial skin         [] Abnormal-            Psychiatric:       [x] Normal Affect [] No Hallucinations        [] Abnormal-     Other pertinent observable physical exam findings-       Recent Results (from the past 672 hour(s))   EKG 12 Lead    Collection Time: 04/29/22  1:33 PM   Result Value Ref Range    P-R Interval 130 ms    QRS Duration 72 ms    Q-T Interval 346 ms    QTc Calculation (Bazett) 398 ms    P Axis 16 degrees    T Axis -2 degrees   CBC with Auto Differential    Collection Time: 139 136 - 145 mmol/L    Potassium reflex Magnesium 3.9 3.5 - 5.0 mmol/L    Chloride 100 98 - 111 mmol/L    CO2 30 (H) 22 - 29 mmol/L    Anion Gap 9 7 - 19 mmol/L    Glucose 98 74 - 109 mg/dL    BUN 9 6 - 20 mg/dL    CREATININE 0.6 0.5 - 0.9 mg/dL    GFR Non-African American >60 >60    GFR African American >59 >59    Calcium 8.2 (L) 8.6 - 10.0 mg/dL   Hemoglobin and hematocrit, blood    Collection Time: 05/13/22  4:09 AM   Result Value Ref Range    Hemoglobin 11.1 (L) 12.0 - 16.0 g/dL    Hematocrit 35.3 (L) 37.0 - 47.0 %       Assessment & Plan: The following diagnoses and conditions are stable with no further orders unless indicated:  1. S/P total left hip arthroplasty  -Continue pain medication as needed per ortho. -Continue HH for PT    2. Postoperative anemia  -We will have HH check CBC this week. 3. Depression with anxiety  -Increase duloxetine to 60 mg daily.  -Reassess in 6 weeks. 4. Vitamin D deficiency  -We will include vitamin D, PTH with lab. -Continue OTC vitamin D3 1000 units daily. We may need to increase dose or start her back on ergocalciferol. 5. Hypocalcemia  -BMP with lab. Diagnostic test results reviewed. Medical Decision Making: high complexity    No follow-ups on file. Pursuant to the emergency declaration under the [facility], [facility] waiver authority and the [employer] and [employer] Act, this Virtual  Visit was conducted, with patient's consent, to reduce the patient's risk of exposure to COVID-19 and provide continuity of care for an established patient. Services were provided through a video synchronous discussion virtually to substitute for in-person clinic visit. An  electronic signature was used to authenticate this note.

## 2022-05-19 LAB
ANION GAP SERPL CALCULATED.3IONS-SCNC: 13 MMOL/L (ref 7–19)
BASOPHILS ABSOLUTE: 0 K/UL (ref 0–0.2)
BASOPHILS RELATIVE PERCENT: 0.3 % (ref 0–1)
BUN BLDV-MCNC: 14 MG/DL (ref 6–20)
CALCIUM SERPL-MCNC: 8.9 MG/DL (ref 8.6–10)
CHLORIDE BLD-SCNC: 104 MMOL/L (ref 98–111)
CO2: 25 MMOL/L (ref 22–29)
CREAT SERPL-MCNC: 0.6 MG/DL (ref 0.5–0.9)
EOSINOPHILS ABSOLUTE: 0.2 K/UL (ref 0–0.6)
EOSINOPHILS RELATIVE PERCENT: 3.9 % (ref 0–5)
GFR AFRICAN AMERICAN: >59
GFR NON-AFRICAN AMERICAN: >60
GLUCOSE BLD-MCNC: 102 MG/DL (ref 74–109)
HCT VFR BLD CALC: 35.9 % (ref 37–47)
HEMOGLOBIN: 11.5 G/DL (ref 12–16)
IMMATURE GRANULOCYTES #: 0 K/UL
LYMPHOCYTES ABSOLUTE: 1.3 K/UL (ref 1.1–4.5)
LYMPHOCYTES RELATIVE PERCENT: 22.1 % (ref 20–40)
MCH RBC QN AUTO: 30 PG (ref 27–31)
MCHC RBC AUTO-ENTMCNC: 32 G/DL (ref 33–37)
MCV RBC AUTO: 93.7 FL (ref 81–99)
MONOCYTES ABSOLUTE: 0.4 K/UL (ref 0–0.9)
MONOCYTES RELATIVE PERCENT: 6.7 % (ref 0–10)
NEUTROPHILS ABSOLUTE: 3.9 K/UL (ref 1.5–7.5)
NEUTROPHILS RELATIVE PERCENT: 66.5 % (ref 50–65)
PARATHYROID HORMONE INTACT: 38.6 PG/ML (ref 15–65)
PDW BLD-RTO: 13.2 % (ref 11.5–14.5)
PLATELET # BLD: 266 K/UL (ref 130–400)
PMV BLD AUTO: 9.5 FL (ref 9.4–12.3)
POTASSIUM SERPL-SCNC: 3.9 MMOL/L (ref 3.5–5)
RBC # BLD: 3.83 M/UL (ref 4.2–5.4)
SODIUM BLD-SCNC: 142 MMOL/L (ref 136–145)
WBC # BLD: 5.8 K/UL (ref 4.8–10.8)

## 2022-06-14 ENCOUNTER — HOSPITAL ENCOUNTER (OUTPATIENT)
Dept: PAIN MANAGEMENT | Age: 52
Discharge: HOME | End: 2022-06-14
Payer: MEDICAID

## 2022-06-14 VITALS
HEIGHT: 65 IN | WEIGHT: 223 LBS | HEART RATE: 92 BPM | TEMPERATURE: 98.4 F | DIASTOLIC BLOOD PRESSURE: 76 MMHG | OXYGEN SATURATION: 96 % | SYSTOLIC BLOOD PRESSURE: 117 MMHG | RESPIRATION RATE: 16 BRPM | BODY MASS INDEX: 37.15 KG/M2

## 2022-06-14 DIAGNOSIS — M54.50 CHRONIC BILATERAL LOW BACK PAIN WITHOUT SCIATICA: Primary | ICD-10-CM

## 2022-06-14 DIAGNOSIS — G89.29 CHRONIC BILATERAL LOW BACK PAIN WITHOUT SCIATICA: Primary | ICD-10-CM

## 2022-06-14 PROCEDURE — 99214 OFFICE O/P EST MOD 30 MIN: CPT | Performed by: NURSE PRACTITIONER

## 2022-06-14 PROCEDURE — 99213 OFFICE O/P EST LOW 20 MIN: CPT

## 2022-06-14 RX ORDER — HYDROCODONE BITARTRATE AND ACETAMINOPHEN 7.5; 325 MG/1; MG/1
1 TABLET ORAL EVERY 8 HOURS PRN
Qty: 90 TABLET | Refills: 0 | Status: SHIPPED | OUTPATIENT
Start: 2022-06-14 | End: 2022-07-06 | Stop reason: SDUPTHER

## 2022-06-14 RX ORDER — IBUPROFEN, ACETAMINOPHEN 125; 250 MG/1; MG/1
2 TABLET, FILM COATED ORAL EVERY 8 HOURS PRN
COMMUNITY
Start: 2021-12-01

## 2022-06-14 ASSESSMENT — PAIN DESCRIPTION - PAIN TYPE
TYPE: CHRONIC PAIN
TYPE_2: CHRONIC PAIN

## 2022-06-14 ASSESSMENT — PAIN DESCRIPTION - ORIENTATION
ORIENTATION_2: LOWER
ORIENTATION: LOWER

## 2022-06-14 ASSESSMENT — PAIN DESCRIPTION - LOCATION
LOCATION: NECK
LOCATION_2: BACK

## 2022-06-14 ASSESSMENT — ENCOUNTER SYMPTOMS
CONSTIPATION: 0
BACK PAIN: 1

## 2022-06-14 ASSESSMENT — PAIN SCALES - GENERAL: PAINLEVEL_OUTOF10: 7

## 2022-06-14 ASSESSMENT — PAIN DESCRIPTION - INTENSITY: RATING_2: 7

## 2022-06-14 NOTE — PROGRESS NOTES
Clinic Documentation      Education Provided:  [x] Review of Chanel Ewing  [] Agreement Review  [x] PEG Score Calculated [] PHQ Score Calculated [] ORT Score Calculated    [] Compliance Issues Discussed [] Cognitive Behavior Needs [x] Exercise [] Review of Test [] Financial Issues  [x] Tobacco/Alcohol Use Reviewed [x] Teaching [] New Patient [] Picture Obtained    Physician Plan:  [] Outgoing Referral  [] Pharmacy Consult  [] Test Ordered [x] Prescription Ordered/Changed   [] Obtained Test Results / Consult Notes        Complete if patient is withholding blood thinner for procedure     Blood Thinner Patient is currently taking:      [] Plavix (Hold for 7 days)  [] Aspirin (Hold for 5 days)     [] Pletal (Hold for 2 days)  [] Pradaxa (Hold for 3 days)    [] Effient (Hold for 7 days)  [] Xarelto (Hold for 2 days)    [] Eliquis (Hold for 2 days)  [] Brilinta (Hold for 7 days)    [] Coumadin (Hold for 5 days) - (INR needs to be drawn the day prior to procedure- INR < 2.0)    [] Aggrenox (Hold for 7 days)        [] Patient will stop medication on their own.    [] Blood Thinner Form Faxed for approval to hold.    Provider form faxed to:     Assessment Completed by:  Electronically signed by Pablo Tubbs RN on 6/14/2022 at 4:04 PM

## 2022-06-14 NOTE — TELEPHONE ENCOUNTER
Requested Prescriptions     Signed Prescriptions Disp Refills    HYDROcodone-acetaminophen (NORCO) 7.5-325 MG per tablet 90 tablet 0     Sig: Take 1 tablet by mouth every 8 hours as needed for Pain for up to 30 days.  Intended supply: 30 days     Authorizing Provider: Roberto Carmona

## 2022-06-14 NOTE — PROGRESS NOTES
[facility]  Office Note    Patient Name: Fredrick Luz    MR #: [id number]    Account #: [de-identified]    : 1970    Age: 49 y.o. Sex: female    Date: 2022    PCP: JAEL Gonzáles    Chief Complaint:   Chief Complaint   Patient presents with    Neck Pain    Back Pain       History of Present Illness:     Fredrick Luz is a 49 y.o. female who presents for procedure follow-up. Patient had bilateral thoracic and lumbar trigger points with Toradol on 2022 and had left SI joint injection with 2022. Patient had at least 85% relief of pain from procedure(s) for at least 6 weeks and was able to increase activity after procedure. Patient received enough pain relief from injections that the patient would like to repeat the injection(s). Patient is now having increased pain in her neck and she is having increased right-sided low back pain. Patient had left hip surgery completed on 2022. Patient has done a dedicated course of physical therapy for postop hip surgery. Since that time patient has started developing more pain in the right side. Neck Pain   This is a chronic problem. The current episode started more than 1 year ago. The problem occurs constantly. The problem has been gradually worsening. Pain location: pain goes down neck right shoulder fells like hot water running down her arm. The quality of the pain is described as aching, cramping and shooting. The symptoms are aggravated by twisting and bending (one position for any length of time). Associated symptoms include numbness. Back Pain  This is a chronic problem. The current episode started more than 1 year ago. The problem occurs constantly. The problem has been gradually worsening since onset. The pain is present in the lumbar spine, sacro-iliac and thoracic spine. The quality of the pain is described as aching, burning, cramping, shooting and stabbing.  Radiates to: into bilateral buttocks more to left. The symptoms are aggravated by sitting and standing. Associated symptoms include numbness. Screening Tools:    PE.7    Past PEG: 10    ORT: 1    PHQ-9: 12    Current Pain Assessment  Pain Assessment  Pain Assessment: 0-10  Pain Level: 7  Pain Location: Neck  Pain Orientation: Lower  Pain Type: Chronic pain    Past Visit HPI:  2021  Patient has had back pain for years. She does not recall any specific injury. Patient states that pain will shoot down her buttocks into her legs at different times. Patient states that if she stands for long periods of time that her back will start to hurt and she feels like she needs to roll forward. Patient has not completed any physical therapy. Patient states that she has a hard time getting comfortable in any position. Patient states that in the afternoon she likes to lie down to flatten her back she feels better at first but she cannot get comfortable. Patient states that she has 100 % low back pain. Patient had hip replacement in 2020. Patient cannot do a lot of lifting, patient does not mop or sweep any longer (family does it) patient can't lift the laundry basket.      On 2020Xray of C Spine indicated degenerative changes with loss of cervical lordosis, Thoracic and Lumbar spine with chronic degenerative changes. MRI has been ordered by neurosurgery.        Past Medical History  Past Medical History:   Diagnosis Date    Abnormal Pap smear of cervix     GERD (gastroesophageal reflux disease)     Hip pain     Hyperlipidemia     Hypertension     Patient thinks she has HTN but untreated    Nephrolithiasis     Primary osteoarthritis of right hip 12/3/2019    Psoriasis        Surgery History  Past Surgical History:   Procedure Laterality Date    CHOLECYSTECTOMY      HYSTERECTOMY (CERVIX STATUS UNKNOWN)      HYSTERECTOMY, VAGINAL      No BSO    LITHOTRIPSY      TOTAL HIP ARTHROPLASTY Right 2020    HIP TOTAL ARTHROPLASTY ANTERIOR APPROACH performed by Jermaine Nuñez MD at [address] Left 2022    LEFT HIP TOTAL ARTHROPLASTY performed by Jermaine Nuñez MD at [address] [facility] History  family history includes Breast Cancer in her paternal grandmother; Coronary Art Dis in her father; Diabetes type 2  in her father; High Blood Pressure in her sister; High Cholesterol in her father and mother; Hypertension in her father and mother; Other in her father and sister. Social History    Social History     Tobacco Use    Smoking status: Former Smoker     Packs/day: 1.00     Years: 30.00     Pack years: 30.00     Types: Cigarettes     Quit date: 2019     Years since quittin.6    Smokeless tobacco: Never Used   Substance Use Topics    Alcohol use: Not Currently       Allergies  Patient has no known allergies. Current Medications  Current Outpatient Medications   Medication Sig Dispense Refill    Ibuprofen-Acetaminophen (ADVIL DUAL ACTION) 125-250 MG TABS Take 2 tablets by mouth every 8 hours as needed      tiZANidine (ZANAFLEX) 4 MG tablet 1/4 tablet with meals 1/2 to whole tablet at bedtime 60 tablet 2    HYDROcodone-acetaminophen (NORCO) 7.5-325 MG per tablet Take 1 tablet by mouth every 8 hours as needed for Pain for up to 30 days. (may fill 22) 90 tablet 0    DULoxetine (CYMBALTA) 60 MG extended release capsule Take 1 capsule by mouth daily 90 capsule 1    vitamin D (D3-1000) 25 MCG (1000 UT) TABS tablet Take 1,000 Units by mouth daily      omeprazole (PRILOSEC) 20 MG delayed release capsule Take 1 capsule by mouth every morning (before breakfast) 90 capsule 2    clobetasol propionate 0.05 % LOTN lotion Apply to affected area twice daily for psoriasis. Avoid use on face.  118 mL 0    Multiple Vitamins-Minerals (THERAPEUTIC MULTIVITAMIN-MINERALS) tablet Take 1 tablet by mouth daily       No current facility-administered medications for this encounter. Review of Systems:  Review of Systems   Constitutional: Positive for activity change. Gastrointestinal: Negative for constipation. Musculoskeletal: Positive for arthralgias, back pain, myalgias, neck pain and neck stiffness. Neurological: Positive for numbness. Psychiatric/Behavioral: Positive for sleep disturbance. Negative for agitation, self-injury and suicidal ideas. The patient is not nervous/anxious. 14 point ROS negative besides that noted in HPI    Physical Exam:    Vitals:    06/14/22 1526   BP: 117/76   Pulse: 92   Resp: 16   Temp: 98.4 °F (36.9 °C)   TempSrc: Temporal   SpO2: 96%   Weight: 223 lb (101.2 kg)   Height: 5' 5\" (1.651 m)       Body mass index is 37.11 kg/m². Physical Exam  Vitals and nursing note reviewed. Constitutional:       General: She is not in acute distress. Appearance: She is well-developed. HENT:      Head: Normocephalic. Right Ear: External ear normal.      Left Ear: External ear normal.      Nose: Nose normal.   Eyes:      Conjunctiva/sclera: Conjunctivae normal.      Pupils: Pupils are equal, round, and reactive to light. Neck:      Vascular: No JVD. Trachea: No tracheal deviation. Cardiovascular:      Rate and Rhythm: Normal rate. Pulmonary:      Effort: Pulmonary effort is normal.   Abdominal:      General: There is no distension. Tenderness: There is no abdominal tenderness. Musculoskeletal:      Cervical back: Spasms (tender palpable knots i.e. trigger points identified) and tenderness present. Pain with movement present. Thoracic back: Spasms (tender palpable knots i.e. trigger points identified) and bony tenderness present. Lumbar back: Spasms (tender palpable knots i.e. trigger points identified) and tenderness (TTP Right >Left with 3 + provactive tests) present.  Negative right straight leg raise test.      Comments: Patient points to on Right > Left SI joint as the source of low back pain Skin:     General: Skin is warm and dry. Neurological:      Mental Status: She is alert and oriented to person, place, and time. Cranial Nerves: No cranial nerve deficit. Psychiatric:         Behavior: Behavior normal.         Thought Content: Thought content normal.         Judgment: Judgment normal.         Labs:  Lab Results   Component Value Date/Time     05/19/2022 04:10 PM    K 3.9 05/19/2022 04:10 PM    K 3.9 05/13/2022 04:09 AM     05/19/2022 04:10 PM    CO2 25 05/19/2022 04:10 PM    BUN 14 05/19/2022 04:10 PM    CREATININE 0.6 05/19/2022 04:10 PM    GLUCOSE 102 05/19/2022 04:10 PM    CALCIUM 8.9 05/19/2022 04:10 PM        Lab Results   Component Value Date    WBC 5.8 05/19/2022    HGB 11.5 (L) 05/19/2022    HCT 35.9 (L) 05/19/2022    MCV 93.7 05/19/2022     05/19/2022       Assessment:  Active Problems:    Primary osteoarthritis of left hip    Chronic bilateral low back pain without sciatica    Chronic neck pain    Degenerative disc disease, cervical    Degenerative disc disease, lumbar    Degenerative disc disease, thoracic    Bilateral myofascial pain    Pain medication agreement    Sacroiliac joint dysfunction of left side    Cervical paraspinal muscle spasm    Back muscle spasm    Numbness and tingling in both hands    Myalgia  Resolved Problems:    * No resolved hospital problems. *      Plan:  Chronic bilateral low back pain without sciatica  Continue medication with refill sent at appointment see refill encounter. 6/14/2022   HYDROcodone-acetaminophen (NORCO) 7.5-325 MG per tablet; Take 1 tablet by mouth every 8 hours as needed for Pain for up to 30 days. Intended supply: 30 days  Dispense: 90 tablet; Refill: 0       Cervical paraspinal muscle spasm  Back muscle spasm  Continue medication with no refill sent at appointment see refill encounter. Continue Zanaflex gradual taper.      Change trigger points to bilateral cervical and lumbar trigger points with toradol instead of bilateral thoracic and lumbar trigger point with Toradol injections due to myofascial pain. SI joint dysfunction  Schedule Right  SI with US with NP as patient has had good relief in the past of left side and now right side is starting to cause pain    Continue nexwave device. [x] Follow up    [] 4 weeks   [x] 6-8 weeks   [] 10-12 weeks   [] 3 months  [x] Post procedure to evaluate effectiveness of treatment  [] To evaluate medications changes made at office visit. [] To review diagnostics ordered at last visit. [] To evaluate response to therapy    [x] For management of controlled substance  [x] Random UDS as indicated by ORT score or if indicated by abberent behaviors    Discussion: Discussed exam findings and plan of care with patient. Patient agreed with POC and questions were asked and answered. Activity: Discussed exercise as beneficial to pain reduction, encouraged daily stretching with a focus on torso strengthening. Goal:  Pain Management Goals of Therapy:   [] Resolution in pain  [x] Decrease in pain level  [x] Improvement in ADL's  [x] Increase in activities with less pain  [] Decrease in medication      Controlled substance monitoring:    [x] Discussed medication side effects, risk of tolerance and/or dependence, and/or alternative treatment  [] Discussed the detrimental effects of long term narcotic use in younger patients especially women of childbearing years.   [x] No signs and symptoms of potential drug abuse or diversion were identified  [] Signs of potential drug abuse or diversion were identified   [] ORT Score   [] UDS non-compliant   [] See Note  [] Random urine drug screen sent today  [x] Random urine drug screen not completed today   [] Deferred New Patient  [x] Compliant   3/14/2022  [] Not Compliant see note  [] Medication agreement with provider signed today 3/14/2022  [x] Medication agreement with provider on file under media   [] Medication regimen effective with no c/o side effects and continued   [] New patient continuing current medication while developing POC   [x] On going assessment and evaluation of medication regimen  [] Medication regimen not effective see plan for changes  [x] Iva Greene reviewed & on file under media       CC:  JAEL Payan, JAEL - CNP, 7/14/2022 at 8:29 AM    EMR dragon/transcription disclaimer: Much of this encounter note is electronic transcription/translation of spoken language to printed tach. Electronic translation of spoken language may be erroneous, or at times, nonsensical words or phrases may be inadvertently transcribed.  Although, I have reviewed the note for such errors, some may still exist.

## 2022-07-05 DIAGNOSIS — M79.18 BILATERAL MYOFASCIAL PAIN: ICD-10-CM

## 2022-07-06 DIAGNOSIS — G89.29 CHRONIC BILATERAL LOW BACK PAIN WITHOUT SCIATICA: ICD-10-CM

## 2022-07-06 DIAGNOSIS — M54.50 CHRONIC BILATERAL LOW BACK PAIN WITHOUT SCIATICA: ICD-10-CM

## 2022-07-06 RX ORDER — TIZANIDINE 4 MG/1
TABLET ORAL
Qty: 60 TABLET | Refills: 2 | Status: SHIPPED | OUTPATIENT
Start: 2022-07-06 | End: 2022-10-13 | Stop reason: SDUPTHER

## 2022-07-06 RX ORDER — HYDROCODONE BITARTRATE AND ACETAMINOPHEN 7.5; 325 MG/1; MG/1
1 TABLET ORAL EVERY 8 HOURS PRN
Qty: 90 TABLET | Refills: 0 | Status: SHIPPED | OUTPATIENT
Start: 2022-07-14 | End: 2022-08-11 | Stop reason: SDUPTHER

## 2022-07-20 ENCOUNTER — HOSPITAL ENCOUNTER (OUTPATIENT)
Dept: PAIN MANAGEMENT | Age: 52
Discharge: HOME | End: 2022-07-20
Payer: MEDICAID

## 2022-07-20 VITALS
TEMPERATURE: 97 F | DIASTOLIC BLOOD PRESSURE: 73 MMHG | HEART RATE: 70 BPM | OXYGEN SATURATION: 95 % | SYSTOLIC BLOOD PRESSURE: 99 MMHG | RESPIRATION RATE: 18 BRPM

## 2022-07-20 PROCEDURE — 20553 NJX 1/MLT TRIGGER POINTS 3/>: CPT

## 2022-07-20 PROCEDURE — 2500000003 HC RX 250 WO HCPCS

## 2022-07-20 PROCEDURE — 20611 DRAIN/INJ JOINT/BURSA W/US: CPT

## 2022-07-20 PROCEDURE — 6360000002 HC RX W HCPCS

## 2022-07-20 PROCEDURE — 20553 NJX 1/MLT TRIGGER POINTS 3/>: CPT | Performed by: NURSE PRACTITIONER

## 2022-07-20 RX ORDER — BUPIVACAINE HYDROCHLORIDE 5 MG/ML
15 INJECTION, SOLUTION EPIDURAL; INTRACAUDAL ONCE
Status: DISCONTINUED | OUTPATIENT
Start: 2022-07-20 | End: 2022-07-22 | Stop reason: HOSPADM

## 2022-07-20 RX ORDER — LIDOCAINE HYDROCHLORIDE 10 MG/ML
15 INJECTION, SOLUTION EPIDURAL; INFILTRATION; INTRACAUDAL; PERINEURAL ONCE
Status: DISCONTINUED | OUTPATIENT
Start: 2022-07-20 | End: 2022-07-22 | Stop reason: HOSPADM

## 2022-07-20 RX ORDER — BUPIVACAINE HYDROCHLORIDE 5 MG/ML
2 INJECTION, SOLUTION EPIDURAL; INTRACAUDAL ONCE
Status: DISCONTINUED | OUTPATIENT
Start: 2022-07-20 | End: 2022-07-22 | Stop reason: HOSPADM

## 2022-07-20 RX ORDER — LIDOCAINE HYDROCHLORIDE 10 MG/ML
2 INJECTION, SOLUTION EPIDURAL; INFILTRATION; INTRACAUDAL; PERINEURAL ONCE
Status: DISCONTINUED | OUTPATIENT
Start: 2022-07-20 | End: 2022-07-22 | Stop reason: HOSPADM

## 2022-07-20 RX ORDER — TRIAMCINOLONE ACETONIDE 40 MG/ML
40 INJECTION, SUSPENSION INTRA-ARTICULAR; INTRAMUSCULAR ONCE
Status: DISCONTINUED | OUTPATIENT
Start: 2022-07-20 | End: 2022-07-22 | Stop reason: HOSPADM

## 2022-07-20 NOTE — PROCEDURES
[facility] Physical [facility] Medicine    Patient Name: Get Delcid    : 1970                    Age: 49 y.o. Sex: female    Date: 2022    Pre-op Diagnosis: Myofascial Pain/ Muscle Spasms/ Cervicalgia    Post-op Diagnosis: Myofascial Pain/ Muscle Spasms/ Cervicalgia    Procedure: Cervical Trigger Point Injections    Performing Procedure: COLLEEN Velazquez, VA-[facility]    Patient Vitals for the past 24 hrs:   BP Temp Temp src Pulse Resp SpO2   22 1312 99/73 97 °F (36.1 °C) Temporal 70 18 95 %       Description of Procedure:    After a brief physical assessment and failure to improve with conservative measures the patient presented for Cervical Trigger Point Injections The indications, limitations and possible complications were discussed with the patient and the patient elected to proceed with the procedure. After voluntary, informed and signed consent obtained the patient was placed in a seated position. Appropriate time out was obtained per policy. The area of maximal tenderness was palpated over the   bilaterally Cervical muscles - Splenius  Trapezius  Rhomboid The skin overlying these areas was marked with a skin marker. The skin overlying the proposed injection sites were then sprayed with Gebauer's Solution while protecting patient eyes. The areas were prepped using aseptic technique with CHG prep.  Each trigger point of the Cervical muscles - Splenius  Trapezius  Rhomboid was injected with approximately 2 ml of a solution of 5 ml of 1% Lidocaine Plain and 5 ml of 0.5% Marcaine Plain    Pre-op Diagnosis: Myofascial Pain/ Muscle Spasms    Post-op Diagnosis: Myofascial Pain/ Muscle Spasms    Procedure: Lumbar Trigger Point Injections    Performing Procedure: COLLEEN Velazquez; VA-BC    Patient Vitals for the past 24 hrs:   BP Temp Temp src Pulse Resp SpO2   22 1312 99/73 97 °F (36.1 °C) Temporal 70 18 95 %       Description of Procedure:    After a brief physical assessment and failure to improve with conservative measures the patient presented for Lumbar Trigger Point Injections The indications, limitations and possible complications were discussed with the patient and the patient elected to proceed with the procedure. After voluntary, informed and signed consent obtained the patient was placed in a seated position. Appropriate time out was obtained per policy. The area of maximal tenderness was palpated over the bilaterally Lumbar Muscles - Lower Latissimus,  Erector Spinae, Lumbar Paraspinous. The skin overlying these areas was marked with a skin marker. The areas were prepped using aseptic technique with CHG prep. The skin overlying the proposed injection sites were then sprayed with Gebauer's Solution while protecting patient eyes. Each trigger point of the Lumbar Muscles - Lower Latissimus,  Erector Spinae, Lumbar Paraspinous was injected after negative aspiration was injected with approximately 1-2 ml of a solution of 5 ml of 1% Lidocaine Plain and 5 ml of 0.5% Marcaine Plain    Discharge: The patient tolerated the procedure well. There were no complications during the procedure and the patient was discharged home with discharge instructions. The patient has been instructed to contact the office should there be any complications or questions to arise between today and their next appointment. Plan: Will return to the office in 6 weeks for follow up    JAEL Ramsay CNP, 2022 at . [facility]    Patient Name: Yaritza Cruz    : 1970    Age: 49 y.o.     Sex: female    Date: 2022    Pre-op Diagnosis: right  Sacroiliac Joint(s) Dysfunction/ Sacroiliitis    Post-op Diagnosis: right  Sacroiliac Joint(s) Dysfunction/ Sacroliliits    Procedure: Ultrasound Guided Injection of  right Sacroiliac Joint(s)     Performing Procedure:  [facility]es[facility]    Patient Vitals for the past 24 hrs:   BP Temp Temp src Pulse Resp SpO2   07/20/22 1312 99/73 97 °F (36.1 °C) Temporal 70 18 95 %       right  Sacroiliac Joint(s)     Description of Procedure:    After voluntary, informed and signed consent obtained the patient was placed in a prone position. Appropriate time out was obtained per policy. The Sacroiliac Joint(s) was palpated for area of maximal tenderness. The area was prepped in an aseptic fashion with CHG swab. The ultrasound transducer was used to confirm the appropriate location. The skin was sprayed with Gebauer's Solution. Under aseptic technique and direct ultrasound visualization a 22 gauge 3 inch spinal needle was introduced into the Sacroiliac Joint(s). After a negative aspiration, a solution of 2 ml of 0.5% Marcaine Plain and 2 ml of 1% Lidocaine Plain and 1 ml of Kenalog (40 mg/ml) was injected into the Sacroiliac Joint(s). The needle was withdrawn and a sterile dressing applied. If this was a bilateral procedure, the same steps were followed on the opposite side. Discharge: The patient tolerated the procedure well. There were no complications during the procedure and the patient was discharged home with discharge instructions. The patient has been instructed to contact the office should there be any complications or questions to arise between today and their next appointment. Plan:    The patient will follow up in the office in approximately 6 weeks.      Marylen Reap, APRN - CNP, 7/20/2022 at 1:57 PM

## 2022-07-20 NOTE — DISCHARGE INSTRUCTIONS
[facility] Physical And Pain Medicine  Post Procedure Discharge Instructions        YOU HAVE HAD THE FOLLOWING PROCEDURE:                                  [] Occipital Nerve Blocks  [] CTS wrist injection(s)  [] Knee Injection(s)         [] Shoulder Injection(s)   [] Elbow Injection(s)     [] Botox Injection  [x] Cervical Trigger Point Injections    [] Thoracic Trigger Point Injections    [x] Lumbar Trigger Point Injections  [] Piriformis Trigger Point Injections  [x] SI Joint Injection(s)     [] Trochanteric Bursa Injection(s)       [] Ankle Injection(s)   [] Plantar Fasciitis   []  ______________  Injection(s) [] Botox []  Migraines [] Spasticity    YOU HAVE RECEIVED THE FOLLOWING MEDICATIONS IN YOUR INJECTION(s)  [x] Lidocaine [x] Bupivacaine   [] DepoMedrol (steroid) [] Decadron (steroid)  [x]  Kenalog (steroid)   [] Toradol  [] Supartz [] Arbutus Boozer    [] Botox        PATIENT INFORMATION:   You may experience the following symptoms after your procedure. These symptoms are normal and should not cause concern: You may have an increase in your pain. This may last 24 - 48 hours after your procedure. You may have no change in the pain that you had prior to your injection(s). You may have weakness or numbness in your affected extremity. If this occurs, this may last until numbing the medication wears off. REPORT THE FOLLOWING SYMPTOMS TO YOUR DOCTOR:  Redness, swelling or drainage at the injection site(s)  Unusual pain that interferes with your normal activities of daily living. OTHER INSTRUCTIONS:    [x] I will apply ice to the injection site(s) for at least 24 hours after the procedure. I will rotate the ice on for 20 minutes and off for 20 minutes for at least 24 hours. [x] I will not apply heat for at least 48 hours and I will not take a hot bath or shower for at least 24 hours.      [x] I understand that if Lidocaine or Bupivacaine was used in my injection(s) that the injection site(s) will be numb for a few hours after the procedure    [x] I understand if a steroid was used it will take effect in 3 - 7 days. I understand that as the numbing medication wears off, the pain may return until the steroids start working. [x] I understand that today I will rest for the next 24 hours and drink plenty of water. [] For Botox for Migraines please do not wear anything constricting around the forehead for 7-10 days post injection. Examples headband, hats, or bandana    [] For Botox for Spasticity start therapy for the affected limb in two weeks. [] Additional instructions: ______________________________________________ ___________________________________________________________________    Sedation:  Was oral sedation given? [] Yes  [x] No    I understand that if I took an oral nerve calming medication I will not drive for  [] 24 hours after taking the medication.     [x] I have received a copy of my discharge instructions and understand the above instructions to the best of my knowledge    Patient Discharged:  [x] Home  [] Hospital  [] Other  ____________________________________________    Via:  [x] Ambulatory  [] Wheelchair   [] Stretcher [] EMS       Accompanied By:   [] Significant other  [] Family Member  [] Friend   [] Hospital Staff  []  Ambulance Staff  [x] Other_______________________________________________    Plan:  [] Will return to the office in   [] 1 month   [] 3 months for:  [] Procedure Follow-up  [x] Office Visit   [x] Planned Procedure      Patient Signature: _____________________________________________________    Staff Signature: _______________________________________________________        If you have questions, problems or concerns you may call [phone] and follow the prompts    COLLEEN Fuentes, VA-BC

## 2022-08-10 DIAGNOSIS — M54.50 CHRONIC BILATERAL LOW BACK PAIN WITHOUT SCIATICA: ICD-10-CM

## 2022-08-10 DIAGNOSIS — G89.29 CHRONIC BILATERAL LOW BACK PAIN WITHOUT SCIATICA: ICD-10-CM

## 2022-08-11 RX ORDER — HYDROCODONE BITARTRATE AND ACETAMINOPHEN 7.5; 325 MG/1; MG/1
1 TABLET ORAL EVERY 8 HOURS PRN
Qty: 90 TABLET | Refills: 0 | Status: SHIPPED | OUTPATIENT
Start: 2022-08-14 | End: 2022-09-15 | Stop reason: SDUPTHER

## 2022-09-15 DIAGNOSIS — G89.29 CHRONIC BILATERAL LOW BACK PAIN WITHOUT SCIATICA: ICD-10-CM

## 2022-09-15 DIAGNOSIS — M54.50 CHRONIC BILATERAL LOW BACK PAIN WITHOUT SCIATICA: ICD-10-CM

## 2022-09-15 RX ORDER — HYDROCODONE BITARTRATE AND ACETAMINOPHEN 7.5; 325 MG/1; MG/1
1 TABLET ORAL EVERY 8 HOURS PRN
Qty: 90 TABLET | Refills: 0 | Status: SHIPPED | OUTPATIENT
Start: 2022-09-16 | End: 2022-10-16

## 2022-09-20 ENCOUNTER — HOSPITAL ENCOUNTER (OUTPATIENT)
Dept: PAIN MANAGEMENT | Age: 52
Discharge: HOME | End: 2022-09-20
Payer: MEDICAID

## 2022-09-20 VITALS
WEIGHT: 222 LBS | TEMPERATURE: 97.3 F | BODY MASS INDEX: 36.99 KG/M2 | DIASTOLIC BLOOD PRESSURE: 86 MMHG | HEART RATE: 94 BPM | SYSTOLIC BLOOD PRESSURE: 127 MMHG | RESPIRATION RATE: 16 BRPM | HEIGHT: 65 IN | OXYGEN SATURATION: 94 %

## 2022-09-20 DIAGNOSIS — M51.36 DEGENERATIVE DISC DISEASE, LUMBAR: ICD-10-CM

## 2022-09-20 DIAGNOSIS — M54.6 CHRONIC MIDLINE THORACIC BACK PAIN: Primary | ICD-10-CM

## 2022-09-20 DIAGNOSIS — G89.29 CHRONIC MIDLINE THORACIC BACK PAIN: Primary | ICD-10-CM

## 2022-09-20 PROCEDURE — 99213 OFFICE O/P EST LOW 20 MIN: CPT

## 2022-09-20 PROCEDURE — 99214 OFFICE O/P EST MOD 30 MIN: CPT | Performed by: NURSE PRACTITIONER

## 2022-09-20 RX ORDER — HYDROCODONE BITARTRATE AND ACETAMINOPHEN 7.5; 325 MG/1; MG/1
1 TABLET ORAL EVERY 6 HOURS PRN
Qty: 115 TABLET | Refills: 0 | Status: SHIPPED | OUTPATIENT
Start: 2022-10-08 | End: 2022-11-07

## 2022-09-20 ASSESSMENT — PAIN DESCRIPTION - PAIN TYPE
TYPE: CHRONIC PAIN
TYPE_2: CHRONIC PAIN

## 2022-09-20 ASSESSMENT — PAIN DESCRIPTION - ORIENTATION
ORIENTATION_2: LOWER
ORIENTATION: LOWER

## 2022-09-20 ASSESSMENT — PAIN DESCRIPTION - LOCATION
LOCATION_2: BACK
LOCATION: NECK

## 2022-09-20 ASSESSMENT — ENCOUNTER SYMPTOMS
BACK PAIN: 1
CONSTIPATION: 0

## 2022-09-20 ASSESSMENT — PAIN DESCRIPTION - INTENSITY: RATING_2: 7

## 2022-09-20 ASSESSMENT — PAIN SCALES - GENERAL: PAINLEVEL_OUTOF10: 7

## 2022-09-20 NOTE — PROGRESS NOTES
Clinic Documentation      Education Provided:  [x] Review of [place]  [] Agreement Review  [x] PEG Score Calculated [] PHQ Score Calculated [] ORT Score Calculated    [] Compliance Issues Discussed [] Cognitive Behavior Needs [x] Exercise [] Review of Test [] Financial Issues  [x] Tobacco/Alcohol Use Reviewed [x] Teaching [] New Patient [] Picture Obtained    Physician Plan:  [] Outgoing Referral  [] Pharmacy Consult  [] Test Ordered [x] Prescription Ordered/Changed   [] Obtained Test Results / Consult Notes        Complete if patient is withholding blood thinner for procedure     Blood Thinner Patient is currently taking:      [] Plavix (Hold for 7 days)  [] Aspirin (Hold for 5 days)     [] Pletal (Hold for 2 days)  [] Pradaxa (Hold for 3 days)    [] Effient (Hold for 7 days)  [] Xarelto (Hold for 2 days)    [] Eliquis (Hold for 2 days)  [] Brilinta (Hold for 7 days)    [] Coumadin (Hold for 5 days) - (INR needs to be drawn the day prior to procedure- INR < 2.0)    [] Aggrenox (Hold for 7 days)        [] Patient will stop medication on their own.    [] Blood Thinner Form Faxed for approval to hold.    Provider form faxed to:     Assessment Completed by:  Electronically signed by Puja Francis RN on 9/20/2022 at 2:21 PM

## 2022-09-20 NOTE — PROGRESS NOTES
[facility] Physical & Pain Medicine  Office Note    Patient Name: Criss Barroso    MR #: [id number]    Account #: [de-identified]    : 1970    Age: 49 y.o. Sex: female    Date: 2022    PCP: JAEL Pastrana    Chief Complaint:   Chief Complaint   Patient presents with    Back Pain    Neck Pain       History of Present Illness:     Criss Barroso is a 49 y.o. female who presents for procedure follow-up. Patient had bilateral cervical and lumbar trigger points 2022. Patient had increase in neck pain following the injections. She does not wish to repeat. Patient had Right SI joint injection with 2022. Patient had at least 85% relief of pain from procedure(s) for at least 6 weeks and was able to increase activity after procedure. Patient received enough pain relief from injections that the patient would like to repeat the injection(s). Patient is having pain returning in left SI joint. Neck Pain   This is a chronic problem. The current episode started more than 1 year ago. The problem occurs constantly. The problem has been waxing and waning. Pain location: pain goes down neck right shoulder fells like hot water running down her arm. The quality of the pain is described as aching, cramping and shooting. The symptoms are aggravated by bending and twisting (one position for any length of time). Associated symptoms include numbness. Back Pain  This is a chronic problem. The current episode started more than 1 year ago. The problem occurs constantly. The problem has been waxing and waning since onset. The pain is present in the lumbar spine, sacro-iliac and thoracic spine. The quality of the pain is described as aching, burning, cramping, shooting and stabbing. Radiates to: into bilateral buttocks more to left. The symptoms are aggravated by sitting and standing. Associated symptoms include numbness.      Screening Tools:    PE    Past PEG: 9. 7    ORT: 1    PHQ-9: 12    Current Pain Assessment  Pain Assessment  Pain Assessment: 0-10  Pain Level: 7  Pain Location: Neck  Pain Orientation: Lower  Pain Type: Chronic pain    Past Visit HPI:  6/2022  Patient is now having increased pain in her neck and she is having increased right-sided low back pain. Patient had left hip surgery completed on 5/13/2022. Patient has done a dedicated course of physical therapy for postop hip surgery. Since that time patient has started developing more pain in the right side. 1/2021  Patient has had back pain for years. She does not recall any specific injury. Patient states that pain will shoot down her buttocks into her legs at different times. Patient states that if she stands for long periods of time that her back will start to hurt and she feels like she needs to roll forward. Patient has not completed any physical therapy. Patient states that she has a hard time getting comfortable in any position. Patient states that in the afternoon she likes to lie down to flatten her back she feels better at first but she cannot get comfortable. Patient states that she has 100 % low back pain. Patient had hip replacement in Feb 2020. Patient cannot do a lot of lifting, patient does not mop or sweep any longer (family does it) patient can't lift the laundry basket. On 9/29/2020Xray of C Spine indicated degenerative changes with loss of cervical lordosis, Thoracic and Lumbar spine with chronic degenerative changes. MRI has been ordered by neurosurgery.        Past Medical History  Past Medical History:   Diagnosis Date    Abnormal Pap smear of cervix     GERD (gastroesophageal reflux disease)     Hip pain     Hyperlipidemia     Hypertension     Patient thinks she has HTN but untreated    Nephrolithiasis     Primary osteoarthritis of right hip 12/3/2019    Psoriasis        Surgery History  Past Surgical History:   Procedure Laterality Date    CHOLECYSTECTOMY      HYSTERECTOMY (CERVIX STATUS UNKNOWN)      HYSTERECTOMY, VAGINAL      No BSO    LITHOTRIPSY      TOTAL HIP ARTHROPLASTY Right 2020    HIP TOTAL ARTHROPLASTY ANTERIOR APPROACH performed by Rose Wilder MD at [facility] [address] Left 2022    LEFT HIP TOTAL ARTHROPLASTY performed by Rose Wilder MD at [address] [place] [address] Road History  family history includes Breast Cancer in her paternal grandmother; Coronary Art Dis in her father; Diabetes type 2  in her father; High Blood Pressure in her sister; High Cholesterol in her father and mother; Hypertension in her father and mother; Other in her father and sister. Social History    Social History     Tobacco Use    Smoking status: Former     Packs/day: 1.00     Years: 30.00     Pack years: 30.00     Types: Cigarettes     Quit date: 2019     Years since quittin.8    Smokeless tobacco: Never   Substance Use Topics    Alcohol use: Not Currently       Allergies  Patient has no known allergies. Current Medications  Current Outpatient Medications   Medication Sig Dispense Refill    HYDROcodone-acetaminophen (NORCO) 7.5-325 MG per tablet Take 1 tablet by mouth every 8 hours as needed for Pain for up to 30 days. (may fill 22) 90 tablet 0    tiZANidine (ZANAFLEX) 4 MG tablet 1/4 tablet with meals 1/2 to whole tablet at bedtime 60 tablet 2    Ibuprofen-Acetaminophen (ADVIL DUAL ACTION) 125-250 MG TABS Take 2 tablets by mouth every 8 hours as needed      DULoxetine (CYMBALTA) 60 MG extended release capsule Take 1 capsule by mouth daily 90 capsule 1    vitamin D (D3-1000) 25 MCG (1000 UT) TABS tablet Take 1,000 Units by mouth daily      omeprazole (PRILOSEC) 20 MG delayed release capsule Take 1 capsule by mouth every morning (before breakfast) 90 capsule 2    clobetasol propionate 0.05 % LOTN lotion Apply to affected area twice daily for psoriasis. Avoid use on face.  118 mL 0    Multiple Vitamins-Minerals (THERAPEUTIC Negative right straight leg raise test.      Comments: Patient points to on Right > Left SI joint as the source of low back pain       Skin:     General: Skin is warm and dry. Neurological:      Mental Status: She is alert and oriented to person, place, and time. Cranial Nerves: No cranial nerve deficit. Psychiatric:         Behavior: Behavior normal.         Thought Content: Thought content normal.         Judgment: Judgment normal.       Labs:  Lab Results   Component Value Date/Time     05/19/2022 04:10 PM    K 3.9 05/19/2022 04:10 PM    K 3.9 05/13/2022 04:09 AM     05/19/2022 04:10 PM    CO2 25 05/19/2022 04:10 PM    BUN 14 05/19/2022 04:10 PM    CREATININE 0.6 05/19/2022 04:10 PM    GLUCOSE 102 05/19/2022 04:10 PM    CALCIUM 8.9 05/19/2022 04:10 PM        Lab Results   Component Value Date    WBC 5.8 05/19/2022    HGB 11.5 (L) 05/19/2022    HCT 35.9 (L) 05/19/2022    MCV 93.7 05/19/2022     05/19/2022       Assessment:  Active Problems:    Primary osteoarthritis of left hip    Chronic bilateral low back pain without sciatica    Chronic neck pain    Degenerative disc disease, cervical    Degenerative disc disease, lumbar    Degenerative disc disease, thoracic    Bilateral myofascial pain    Pain medication agreement    Sacroiliac joint dysfunction of both sides    Cervical paraspinal muscle spasm    Back muscle spasm    Numbness and tingling in both hands    Myalgia  Resolved Problems:    * No resolved hospital problems. *      Plan:  Chronic midline thoracic back pain  Degenerative disc disease, lumbar  - HYDROcodone-acetaminophen (NORCO) 7.5-325 MG per tablet; Take 1 tablet by mouth every 6 hours as needed for Pain for up to 30 days. Intended supply: 30 days  Dispense: 115 tablet; Refill: 0      Increase in quantity to help with better pain coverage. Patient given early refill due to instruction change.      Cervical paraspinal muscle spasm  Back muscle spasm  Continue medication no c/o side effects and continued   [] New patient continuing current medication while developing POC   [x] On going assessment and evaluation of medication regimen  [] Medication regimen not effective see plan for changes  [x] Claire Gonzalez reviewed & on file under media       CC:  JAEL Rosas, JAEL - CNP, 9/20/2022 at 1:55 PM    EMR dragon/transcription disclaimer: Much of this encounter note is electronic transcription/translation of spoken language to printed tach. Electronic translation of spoken language may be erroneous, or at times, nonsensical words or phrases may be inadvertently transcribed.  Although, I have reviewed the note for such errors, some may still exist.

## 2022-10-10 ENCOUNTER — TELEPHONE (OUTPATIENT)
Dept: PAIN MANAGEMENT | Age: 52
End: 2022-10-10

## 2022-10-13 DIAGNOSIS — M79.18 BILATERAL MYOFASCIAL PAIN: ICD-10-CM

## 2022-10-13 RX ORDER — TIZANIDINE 4 MG/1
TABLET ORAL
Qty: 60 TABLET | Refills: 2 | Status: SHIPPED | OUTPATIENT
Start: 2022-10-13

## 2022-11-09 DIAGNOSIS — M54.6 CHRONIC MIDLINE THORACIC BACK PAIN: ICD-10-CM

## 2022-11-09 DIAGNOSIS — M51.36 DEGENERATIVE DISC DISEASE, LUMBAR: ICD-10-CM

## 2022-11-09 DIAGNOSIS — G89.29 CHRONIC MIDLINE THORACIC BACK PAIN: ICD-10-CM

## 2022-11-10 RX ORDER — HYDROCODONE BITARTRATE AND ACETAMINOPHEN 7.5; 325 MG/1; MG/1
1 TABLET ORAL EVERY 6 HOURS PRN
Qty: 115 TABLET | Refills: 0 | Status: SHIPPED | OUTPATIENT
Start: 2022-11-10 | End: 2022-12-10

## 2022-11-21 RX ORDER — DULOXETIN HYDROCHLORIDE 60 MG/1
60 CAPSULE, DELAYED RELEASE ORAL DAILY
Qty: 90 CAPSULE | Refills: 1 | OUTPATIENT
Start: 2022-11-21

## 2022-11-23 NOTE — TELEPHONE ENCOUNTER
Otoniel Abdul 1997 called to request a refill on her medication.       Last office visit : 5/18/2022   Next office visit : 12/2/2022     Requested Prescriptions     Pending Prescriptions Disp Refills    DULoxetine (CYMBALTA) 60 MG extended release capsule 90 capsule 1     Sig: Take 1 capsule by mouth daily            Tiffany Winter

## 2022-11-28 RX ORDER — DULOXETIN HYDROCHLORIDE 60 MG/1
60 CAPSULE, DELAYED RELEASE ORAL DAILY
Qty: 90 CAPSULE | Refills: 1 | OUTPATIENT
Start: 2022-11-28

## 2022-11-28 RX ORDER — DULOXETIN HYDROCHLORIDE 60 MG/1
60 CAPSULE, DELAYED RELEASE ORAL DAILY
Qty: 90 CAPSULE | Refills: 1 | Status: SHIPPED | OUTPATIENT
Start: 2022-11-28

## 2022-12-05 DIAGNOSIS — M51.36 DEGENERATIVE DISC DISEASE, LUMBAR: ICD-10-CM

## 2022-12-05 DIAGNOSIS — G89.29 CHRONIC MIDLINE THORACIC BACK PAIN: ICD-10-CM

## 2022-12-05 DIAGNOSIS — M54.6 CHRONIC MIDLINE THORACIC BACK PAIN: ICD-10-CM

## 2022-12-06 RX ORDER — HYDROCODONE BITARTRATE AND ACETAMINOPHEN 7.5; 325 MG/1; MG/1
1 TABLET ORAL EVERY 6 HOURS PRN
Qty: 115 TABLET | Refills: 0 | Status: SHIPPED | OUTPATIENT
Start: 2022-12-10 | End: 2023-01-09

## 2022-12-07 ENCOUNTER — HOSPITAL ENCOUNTER (OUTPATIENT)
Dept: PAIN MANAGEMENT | Age: 52
Discharge: HOME | End: 2022-12-07
Payer: MEDICAID

## 2022-12-07 VITALS
TEMPERATURE: 96.9 F | DIASTOLIC BLOOD PRESSURE: 78 MMHG | SYSTOLIC BLOOD PRESSURE: 103 MMHG | OXYGEN SATURATION: 93 % | RESPIRATION RATE: 18 BRPM | HEART RATE: 78 BPM

## 2022-12-07 PROCEDURE — 20611 DRAIN/INJ JOINT/BURSA W/US: CPT

## 2022-12-07 PROCEDURE — 6360000002 HC RX W HCPCS

## 2022-12-07 PROCEDURE — 2500000003 HC RX 250 WO HCPCS

## 2022-12-07 RX ORDER — BUPIVACAINE HYDROCHLORIDE 5 MG/ML
4 INJECTION, SOLUTION EPIDURAL; INTRACAUDAL ONCE
Status: DISCONTINUED | OUTPATIENT
Start: 2022-12-07 | End: 2022-12-09 | Stop reason: HOSPADM

## 2022-12-07 RX ORDER — TRIAMCINOLONE ACETONIDE 40 MG/ML
80 INJECTION, SUSPENSION INTRA-ARTICULAR; INTRAMUSCULAR ONCE
Status: DISCONTINUED | OUTPATIENT
Start: 2022-12-07 | End: 2022-12-09 | Stop reason: HOSPADM

## 2022-12-07 RX ORDER — LIDOCAINE HYDROCHLORIDE 10 MG/ML
4 INJECTION, SOLUTION EPIDURAL; INFILTRATION; INTRACAUDAL; PERINEURAL ONCE
Status: DISCONTINUED | OUTPATIENT
Start: 2022-12-07 | End: 2022-12-09 | Stop reason: HOSPADM

## 2022-12-07 NOTE — PROCEDURES
[facility] Physical & Pain Medicine    Patient Name: Carlita Rapp    : 1970    Age: 49 y.o. Sex: female    Date: 2022    Pre-op Diagnosis: bilateral  Sacroiliac Joint(s) Dysfunction/ Sacroiliitis    Post-op Diagnosis: bilateral  Sacroiliac Joint(s) Dysfunction/ Sacroliliits    Procedure: Ultrasound Guided Injection of  bilateral Sacroiliac Joint(s)     Performing Procedure:  Ulysses Redwood, ACAGNP [facility]    Patient Vitals for the past 24 hrs:   BP Temp Temp src Pulse Resp SpO2   22 1326 103/78 96.9 °F (36.1 °C) Temporal 78 18 93 %       bilateral  Sacroiliac Joint(s)     Description of Procedure:    After voluntary, informed and signed consent obtained the patient was placed in a prone position. Appropriate time out was obtained per policy. The Sacroiliac Joint(s) was palpated for area of maximal tenderness. The area was prepped in an aseptic fashion with CHG swab. The ultrasound transducer was used to confirm the appropriate location. The skin was sprayed with Gebauer's Solution. Under aseptic technique and direct ultrasound visualization a 22 gauge 3 inch spinal needle was introduced into the Sacroiliac Joint(s). After a negative aspiration, a solution of 2 ml of 0.5% Marcaine Plain and 2 ml of 1% Lidocaine Plain and 1 ml of Kenalog (40 mg/ml) was injected into the Sacroiliac Joint(s). The needle was withdrawn and a sterile dressing applied. If this was a bilateral procedure, the same steps were followed on the opposite side. Discharge: The patient tolerated the procedure well. There were no complications during the procedure and the patient was discharged home with discharge instructions. The patient has been instructed to contact the office should there be any complications or questions to arise between today and their next appointment. Plan:    The patient will follow up in the office in approximately 6 weeks.      JAEL Troy CNP, 2022 at 2:38 PM

## 2022-12-07 NOTE — DISCHARGE INSTRUCTIONS
[facility] Physical And Pain Medicine  Post Procedure Discharge Instructions        YOU HAVE HAD THE FOLLOWING PROCEDURE:                                  [] Occipital Nerve Blocks  [] CTS wrist injection(s)  [] Knee Injection(s)         [] Shoulder Injection(s)   [] Elbow Injection(s)     [] Botox Injection  [] Cervical Trigger Point Injections    [] Thoracic Trigger Point Injections    [] Lumbar Trigger Point Injections  [] Piriformis Trigger Point Injections  [x] SI Joint Injection(s)     [] Trochanteric Bursa Injection(s)       [] Ankle Injection(s)   [] Plantar Fasciitis   []  ______________  Injection(s) [] Botox []  Migraines [] Spasticity    YOU HAVE RECEIVED THE FOLLOWING MEDICATIONS IN YOUR INJECTION(s)  [x] Lidocaine [x] Bupivacaine   [] DepoMedrol (steroid) [] Decadron (steroid)  [x]  Kenalog (steroid)   [] Toradol  [] Supartz [] Morenita Swati    [] Botox        PATIENT INFORMATION:   You may experience the following symptoms after your procedure. These symptoms are normal and should not cause concern: You may have an increase in your pain. This may last 24 - 48 hours after your procedure. You may have no change in the pain that you had prior to your injection(s). You may have weakness or numbness in your affected extremity. If this occurs, this may last until numbing the medication wears off. REPORT THE FOLLOWING SYMPTOMS TO YOUR DOCTOR:  Redness, swelling or drainage at the injection site(s)  Unusual pain that interferes with your normal activities of daily living. OTHER INSTRUCTIONS:    [x] I will apply ice to the injection site(s) for at least 24 hours after the procedure. I will rotate the ice on for 20 minutes and off for 20 minutes for at least 24 hours. [x] I will not apply heat for at least 48 hours and I will not take a hot bath or shower for at least 24 hours.      [x] I understand that if Lidocaine or Bupivacaine was used in my injection(s) that the injection site(s) will be numb for a few hours after the procedure    [x] I understand if a steroid was used it will take effect in 3 - 7 days. I understand that as the numbing medication wears off, the pain may return until the steroids start working. [x] I understand that today I will rest for the next 24 hours and drink plenty of water. [] For Botox for Migraines please do not wear anything constricting around the forehead for 7-10 days post injection. Examples headband, hats, or bandana    [] For Botox for Spasticity start therapy for the affected limb in two weeks. [] Additional instructions: ______________________________________________ ___________________________________________________________________    Sedation:  Was oral sedation given? [] Yes  [x] No    I understand that if I took an oral nerve calming medication I will not drive for  [] 24 hours after taking the medication.     [x] I have received a copy of my discharge instructions and understand the above instructions to the best of my knowledge    Patient Discharged:  [x] Home  [] Hospital  [] Other  ____________________________________________    Via:  [x] Ambulatory  [] Wheelchair   [] Stretcher [] EMS       Accompanied By:   [] Significant other  [] Family Member  [] Friend   [] Hospital Staff  []  Ambulance Staff  [] Other_______________________________________________    Plan:  [x] Will return to the office in   [] 1 month   [] 3 months for:  [] Procedure Follow-up  [] Office Visit   [] Planned Procedure      Patient Signature: _____________________________________________________    Staff Signature: _______________________________________________________        If you have questions, problems or concerns you may call [phone] and follow the prompts    COLLEEN Goetz, [place]-BC

## 2022-12-13 ENCOUNTER — TELEPHONE (OUTPATIENT)
Dept: PAIN MANAGEMENT | Age: 52
End: 2022-12-13

## 2022-12-16 RX ORDER — OMEPRAZOLE 20 MG/1
20 CAPSULE, DELAYED RELEASE ORAL
Qty: 90 CAPSULE | Refills: 2 | Status: SHIPPED | OUTPATIENT
Start: 2022-12-16

## 2022-12-16 NOTE — TELEPHONE ENCOUNTER
Otoniel Chantell 1997 called to request a refill on her medication.       Last office visit : 5/18/2022   Next office visit : Visit date not found     Requested Prescriptions     Pending Prescriptions Disp Refills    omeprazole (PRILOSEC) 20 MG delayed release capsule 90 capsule 2     Sig: Take 1 capsule by mouth every morning (before breakfast)            Emmanuel Cooper MA

## 2022-12-19 RX ORDER — OMEPRAZOLE 20 MG/1
CAPSULE, DELAYED RELEASE ORAL
Qty: 90 CAPSULE | Refills: 2 | OUTPATIENT
Start: 2022-12-19

## 2023-01-06 ENCOUNTER — OFFICE VISIT (OUTPATIENT)
Dept: PRIMARY CARE CLINIC | Age: 53
End: 2023-01-06

## 2023-01-06 VITALS
BODY MASS INDEX: 36.99 KG/M2 | HEIGHT: 65 IN | SYSTOLIC BLOOD PRESSURE: 120 MMHG | DIASTOLIC BLOOD PRESSURE: 70 MMHG | WEIGHT: 222 LBS | HEART RATE: 80 BPM | OXYGEN SATURATION: 97 % | TEMPERATURE: 98.1 F

## 2023-01-06 DIAGNOSIS — M54.2 CHRONIC NECK PAIN: ICD-10-CM

## 2023-01-06 DIAGNOSIS — E78.49 FAMILIAL HYPERLIPIDEMIA: ICD-10-CM

## 2023-01-06 DIAGNOSIS — R51.9 PERSISTENT HEADACHES: Primary | ICD-10-CM

## 2023-01-06 DIAGNOSIS — G89.29 CHRONIC NECK PAIN: ICD-10-CM

## 2023-01-06 RX ORDER — KETOROLAC TROMETHAMINE 30 MG/ML
30 INJECTION, SOLUTION INTRAMUSCULAR; INTRAVENOUS ONCE
Status: COMPLETED | OUTPATIENT
Start: 2023-01-06 | End: 2023-01-06

## 2023-01-06 RX ORDER — TOPIRAMATE 100 MG/1
100 TABLET, FILM COATED ORAL DAILY
Qty: 30 TABLET | Refills: 3 | Status: SHIPPED | OUTPATIENT
Start: 2023-01-06

## 2023-01-06 RX ORDER — TOPIRAMATE 25 MG/1
TABLET ORAL
Qty: 42 TABLET | Refills: 0 | Status: SHIPPED | OUTPATIENT
Start: 2023-01-06 | End: 2023-01-27

## 2023-01-06 RX ADMIN — KETOROLAC TROMETHAMINE 30 MG: 30 INJECTION, SOLUTION INTRAMUSCULAR; INTRAVENOUS at 13:51

## 2023-01-06 ASSESSMENT — PATIENT HEALTH QUESTIONNAIRE - PHQ9
SUM OF ALL RESPONSES TO PHQ QUESTIONS 1-9: 4
2. FEELING DOWN, DEPRESSED OR HOPELESS: 0
9. THOUGHTS THAT YOU WOULD BE BETTER OFF DEAD, OR OF HURTING YOURSELF: 0
10. IF YOU CHECKED OFF ANY PROBLEMS, HOW DIFFICULT HAVE THESE PROBLEMS MADE IT FOR YOU TO DO YOUR WORK, TAKE CARE OF THINGS AT HOME, OR GET ALONG WITH OTHER PEOPLE: 0
SUM OF ALL RESPONSES TO PHQ QUESTIONS 1-9: 4
5. POOR APPETITE OR OVEREATING: 0
8. MOVING OR SPEAKING SO SLOWLY THAT OTHER PEOPLE COULD HAVE NOTICED. OR THE OPPOSITE, BEING SO FIGETY OR RESTLESS THAT YOU HAVE BEEN MOVING AROUND A LOT MORE THAN USUAL: 0
6. FEELING BAD ABOUT YOURSELF - OR THAT YOU ARE A FAILURE OR HAVE LET YOURSELF OR YOUR FAMILY DOWN: 0
7. TROUBLE CONCENTRATING ON THINGS, SUCH AS READING THE NEWSPAPER OR WATCHING TELEVISION: 0
SUM OF ALL RESPONSES TO PHQ QUESTIONS 1-9: 4
4. FEELING TIRED OR HAVING LITTLE ENERGY: 1
3. TROUBLE FALLING OR STAYING ASLEEP: 3
SUM OF ALL RESPONSES TO PHQ9 QUESTIONS 1 & 2: 0
1. LITTLE INTEREST OR PLEASURE IN DOING THINGS: 0
SUM OF ALL RESPONSES TO PHQ QUESTIONS 1-9: 4

## 2023-01-06 NOTE — PROGRESS NOTES
Dian Ganser is a 47 y.o. female who presents today for  Chief Complaint   Patient presents with    Follow-up     Having bad headaches for the last month and neck seems to be popping. HPI:  She has had persistent headaches for the past month. Pain starts in the posterior region and radiates to frontal.  Worse at R frontal.  Headaches are daily and may last hours. She has had no visual changes. No dizziness or ataxia. Ibuprofen has been ineffective. She has had increased neck pain as well for the past month. She has frequent \"popping\" and \"crunching\" sensations. Pain is localized mainly to R neck. She is followed by pain management mainly for lower back pain. She takes hydrocodone and tizanidine chronically. She states she had trigger point injections in July for her neck with no improvement. She states pain has progressively worsened since then. She has had not had her fasting labs as previously ordered several months ago. She has hyperlipidemia, diet controlled. Lab 10/2021 showed total 223, . Review of Systems   Constitutional:  Negative for chills. HENT:  Negative for congestion, ear pain and sore throat. Eyes:  Negative for visual disturbance. Respiratory:  Negative for cough, chest tightness, shortness of breath and wheezing. Cardiovascular:  Negative for chest pain. Gastrointestinal:  Negative for abdominal pain, diarrhea and nausea. Musculoskeletal:  Positive for back pain and neck pain. Negative for myalgias. Skin:  Negative for rash. Neurological:  Positive for headaches. Negative for dizziness, syncope, speech difficulty, weakness and light-headedness.      Past Medical History:   Diagnosis Date    Abnormal Pap smear of cervix     GERD (gastroesophageal reflux disease)     Hip pain     Hyperlipidemia     Hypertension     Patient thinks she has HTN but untreated    Nephrolithiasis     Primary osteoarthritis of right hip 12/3/2019    Psoriasis Current Outpatient Medications   Medication Sig Dispense Refill    topiramate (TOPAMAX) 25 MG tablet Take 1 tablet by mouth daily for 7 days, THEN 2 tablets daily for 7 days, THEN 3 tablets daily for 7 days. Then start the 100 mg tablet daily. . 42 tablet 0    topiramate (TOPAMAX) 100 MG tablet Take 1 tablet by mouth daily (Start after completing the previous prescription) 30 tablet 3    omeprazole (PRILOSEC) 20 MG delayed release capsule Take 1 capsule by mouth every morning (before breakfast) 90 capsule 2    HYDROcodone-acetaminophen (NORCO) 7.5-325 MG per tablet Take 1 tablet by mouth every 6 hours as needed for Pain for up to 30 days. (May fill 12/10/22) 115 tablet 0    DULoxetine (CYMBALTA) 60 MG extended release capsule Take 1 capsule by mouth daily 90 capsule 1    tiZANidine (ZANAFLEX) 4 MG tablet 1/4 tablet with meals 1/2 to whole tablet at bedtime 60 tablet 2    Ibuprofen-Acetaminophen (ADVIL DUAL ACTION) 125-250 MG TABS Take 2 tablets by mouth every 8 hours as needed      vitamin D (D3-1000) 25 MCG (1000 UT) TABS tablet Take 1,000 Units by mouth daily      clobetasol propionate 0.05 % LOTN lotion Apply to affected area twice daily for psoriasis. Avoid use on face. 118 mL 0    Multiple Vitamins-Minerals (THERAPEUTIC MULTIVITAMIN-MINERALS) tablet Take 1 tablet by mouth daily       No current facility-administered medications for this visit.        No Known Allergies    Past Surgical History:   Procedure Laterality Date    CHOLECYSTECTOMY      HYSTERECTOMY (CERVIX STATUS UNKNOWN)      HYSTERECTOMY, VAGINAL      No BSO    LITHOTRIPSY      TOTAL HIP ARTHROPLASTY Right 2/27/2020    HIP TOTAL ARTHROPLASTY ANTERIOR APPROACH performed by Rachelle Bloom MD at [facility] Left 5/12/2022    LEFT HIP TOTAL ARTHROPLASTY performed by Rachelle Bloom MD at [place] [address] History     Tobacco Use    Smoking status: Former     Packs/day: 1.00     Years: 30.00     Pack years: 30. 00     Types: Cigarettes     Quit date: 12/1/2019     Years since quitting: 3.1    Smokeless tobacco: Never   Vaping Use    Vaping Use: Never used   Substance Use Topics    Alcohol use: Not Currently    Drug use: Never       Family History   Problem Relation Age of Onset    Breast Cancer Paternal Grandmother     Hypertension Mother     High Cholesterol Mother     Hypertension Father     Other Father         GERD, nephrolithiasis    High Cholesterol Father     Diabetes type 2  Father     Coronary Art Dis Father     High Blood Pressure Sister     Other Sister         GERD       /70   Pulse 80   Temp 98.1 °F (36.7 °C)   Ht 5' 5\" (1.651 m)   Wt 222 lb (100.7 kg)   SpO2 97%   BMI 36.94 kg/m²     Physical Exam  Vitals reviewed. Constitutional:       General: She is not in acute distress. Appearance: Normal appearance. She is well-developed. HENT:      Head: Normocephalic. Right Ear: Tympanic membrane and external ear normal.      Left Ear: Tympanic membrane and external ear normal.      Nose: Nose normal.      Mouth/Throat:      Mouth: Mucous membranes are moist.      Pharynx: No oropharyngeal exudate or posterior oropharyngeal erythema. Eyes:      Conjunctiva/sclera: Conjunctivae normal.      Pupils: Pupils are equal, round, and reactive to light. Neck:      Thyroid: No thyromegaly. Vascular: No carotid bruit or JVD. Trachea: No tracheal deviation. Cardiovascular:      Rate and Rhythm: Normal rate and regular rhythm. Heart sounds: Normal heart sounds. No murmur heard. Pulmonary:      Effort: Pulmonary effort is normal. No respiratory distress. Breath sounds: Normal breath sounds. No wheezing or rhonchi. Musculoskeletal:         General: Tenderness (tenderness to deep palpation along R cervical paraspinal region) present. Normal range of motion. Cervical back: Normal range of motion and neck supple. Lymphadenopathy:      Cervical: No cervical adenopathy. Skin:     General: Skin is warm and dry. Findings: No rash. Neurological:      General: No focal deficit present. Mental Status: She is alert. Motor: No weakness. Coordination: Coordination normal.   Psychiatric:         Mood and Affect: Mood normal.         Thought Content: Thought content normal.       ASSESSMENT/PLAN:  1. Persistent headaches  -Likely tension or related to neck pain but atypical migraine is possible.   -Toradol 30 mg IM today for acute pain. -Trial of topiramate. She will titrate up weekly starting at 25 mg, then 50 mg, then 75 mg. On week 4 she will start 100 mg daily. SE profile reviewed. -Reassess in 1 month  - ketorolac (TORADOL) injection 30 mg  - CBC with Auto Differential; Future    2. Chronic neck pain  -Followed by pain management. She will discuss further with them at appointment next month. She will report any acute worsening or problems.  -Toradol as above for acute pain  - ketorolac (TORADOL) injection 30 mg  - CBC with Auto Differential; Future    3. Familial hyperlipidemia  -Needs fasting lipid. Continue to work on low-fat diet, weight loss. - Comprehensive Metabolic Panel; Future  - Lipid Panel; Future       Return in about 1 month (around 2/6/2023) for follow up, 30 minute visit. Avelina was seen today for follow-up. Diagnoses and all orders for this visit:    Persistent headaches  -     ketorolac (TORADOL) injection 30 mg  -     CBC with Auto Differential; Future    Chronic neck pain  -     ketorolac (TORADOL) injection 30 mg  -     CBC with Auto Differential; Future    Familial hyperlipidemia  -     Comprehensive Metabolic Panel; Future  -     Lipid Panel; Future    Other orders  -     topiramate (TOPAMAX) 25 MG tablet; Take 1 tablet by mouth daily for 7 days, THEN 2 tablets daily for 7 days, THEN 3 tablets daily for 7 days. Then start the 100 mg tablet daily. .  -     topiramate (TOPAMAX) 100 MG tablet;  Take 1 tablet by mouth daily (Start after completing the previous prescription)    There are no discontinued medications. There are no Patient Instructions on file for this visit. Patient voicesunderstanding and agrees to plans along with risks and benefits of plan. Counseling:  Shermanfranky Dykesy's case, medications and options were discussed in detail. Patient was instructed to call the office if she questionsregarding her treatment. Should her conditions worsen, she should return to office to be reassessed by JAEL Myers. she Should to go the closest Emergency Department for any emergency. They verbalizedunderstanding the above instructions. Return in about 1 month (around 2/6/2023) for follow up, 30 minute visit.

## 2023-01-07 DIAGNOSIS — M79.18 BILATERAL MYOFASCIAL PAIN: ICD-10-CM

## 2023-01-08 ASSESSMENT — ENCOUNTER SYMPTOMS
BACK PAIN: 1
SORE THROAT: 0
NAUSEA: 0
DIARRHEA: 0
SHORTNESS OF BREATH: 0
COUGH: 0
WHEEZING: 0
ABDOMINAL PAIN: 0
CHEST TIGHTNESS: 0

## 2023-01-10 DIAGNOSIS — M54.6 CHRONIC MIDLINE THORACIC BACK PAIN: ICD-10-CM

## 2023-01-10 DIAGNOSIS — G89.29 CHRONIC MIDLINE THORACIC BACK PAIN: ICD-10-CM

## 2023-01-10 DIAGNOSIS — M79.18 BILATERAL MYOFASCIAL PAIN: ICD-10-CM

## 2023-01-10 DIAGNOSIS — M51.36 DEGENERATIVE DISC DISEASE, LUMBAR: ICD-10-CM

## 2023-01-12 RX ORDER — TIZANIDINE 4 MG/1
TABLET ORAL
Qty: 60 TABLET | Refills: 2 | Status: SHIPPED | OUTPATIENT
Start: 2023-01-12

## 2023-01-12 RX ORDER — HYDROCODONE BITARTRATE AND ACETAMINOPHEN 7.5; 325 MG/1; MG/1
1 TABLET ORAL EVERY 6 HOURS PRN
Qty: 115 TABLET | Refills: 0 | Status: SHIPPED | OUTPATIENT
Start: 2023-01-12 | End: 2023-02-11

## 2023-01-25 RX ORDER — TOPIRAMATE 25 MG/1
TABLET ORAL
Qty: 42 TABLET | Refills: 0 | OUTPATIENT
Start: 2023-01-25

## 2023-02-08 DIAGNOSIS — M51.36 DEGENERATIVE DISC DISEASE, LUMBAR: ICD-10-CM

## 2023-02-08 DIAGNOSIS — M54.6 CHRONIC MIDLINE THORACIC BACK PAIN: ICD-10-CM

## 2023-02-08 DIAGNOSIS — G89.29 CHRONIC MIDLINE THORACIC BACK PAIN: ICD-10-CM

## 2023-02-09 RX ORDER — HYDROCODONE BITARTRATE AND ACETAMINOPHEN 7.5; 325 MG/1; MG/1
1 TABLET ORAL EVERY 6 HOURS PRN
Qty: 115 TABLET | Refills: 0 | Status: SHIPPED | OUTPATIENT
Start: 2023-02-11 | End: 2023-03-13

## 2023-02-13 ENCOUNTER — OFFICE VISIT (OUTPATIENT)
Dept: PRIMARY CARE CLINIC | Age: 53
End: 2023-02-13
Payer: MEDICAID

## 2023-02-13 VITALS
OXYGEN SATURATION: 96 % | SYSTOLIC BLOOD PRESSURE: 116 MMHG | HEIGHT: 65 IN | WEIGHT: 217 LBS | BODY MASS INDEX: 36.15 KG/M2 | HEART RATE: 92 BPM | TEMPERATURE: 98 F | DIASTOLIC BLOOD PRESSURE: 70 MMHG

## 2023-02-13 DIAGNOSIS — G89.29 CHRONIC NECK PAIN: ICD-10-CM

## 2023-02-13 DIAGNOSIS — M54.2 CHRONIC NECK PAIN: ICD-10-CM

## 2023-02-13 DIAGNOSIS — G44.229 CHRONIC TENSION-TYPE HEADACHE, NOT INTRACTABLE: Primary | ICD-10-CM

## 2023-02-13 DIAGNOSIS — E78.49 FAMILIAL HYPERLIPIDEMIA: ICD-10-CM

## 2023-02-13 PROCEDURE — 99214 OFFICE O/P EST MOD 30 MIN: CPT | Performed by: NURSE PRACTITIONER

## 2023-02-13 RX ORDER — RIZATRIPTAN BENZOATE 10 MG/1
10 TABLET ORAL
Qty: 9 TABLET | Refills: 5 | Status: SHIPPED | OUTPATIENT
Start: 2023-02-13 | End: 2023-02-13

## 2023-02-13 ASSESSMENT — ENCOUNTER SYMPTOMS
ABDOMINAL PAIN: 0
SHORTNESS OF BREATH: 0
WHEEZING: 0
COUGH: 0
NAUSEA: 0
BACK PAIN: 1
DIARRHEA: 0
CHEST TIGHTNESS: 0
SORE THROAT: 0

## 2023-02-13 NOTE — PROGRESS NOTES
Queenie Garcia is a 47 y.o. female who presents today for  Chief Complaint   Patient presents with    Follow-up       HPI:  Here for follow-up on chronic headaches. She started topiramate 1 month ago and has titrated up to 100 mg. She has taken this dose for about a week. She is taking it at night. She has mild dizziness/lightheadedness the following morning but resolves. Otherwise tolerating well. Overall headaches have improved in intensity. She continues to have daily mild headache. She has a more intense headache 1-2 times a week. She relates her headaches to her chronic neck pain. She has an appointment with pain management next month. She has received injections in the past.  She remains on hydrocodone and tizanidine for her neck. Hyperlipidemia, diet controlled. Has not had her fasting labs yet. Review of Systems   Constitutional:  Negative for chills and fever. HENT:  Negative for congestion, ear pain and sore throat. Respiratory:  Negative for cough, chest tightness, shortness of breath and wheezing. Cardiovascular:  Negative for chest pain. Gastrointestinal:  Negative for abdominal pain, diarrhea and nausea. Musculoskeletal:  Positive for back pain (chronic) and neck pain. Negative for arthralgias and myalgias. Skin:  Negative for rash. Neurological:  Positive for headaches.      Past Medical History:   Diagnosis Date    Abnormal Pap smear of cervix     GERD (gastroesophageal reflux disease)     Hip pain     Hyperlipidemia     Hypertension     Patient thinks she has HTN but untreated    Nephrolithiasis     Primary osteoarthritis of right hip 12/3/2019    Psoriasis        Current Outpatient Medications   Medication Sig Dispense Refill    rizatriptan (MAXALT) 10 MG tablet Take 1 tablet by mouth once as needed for Migraine May repeat in 2 hours if needed 9 tablet 5    HYDROcodone-acetaminophen (NORCO) 7.5-325 MG per tablet Take 1 tablet by mouth every 6 hours as needed for Pain for up to 30 days. (May fill 2/11/23) 115 tablet 0    tiZANidine (ZANAFLEX) 4 MG tablet 1/4 tablet with meals 1/2 to whole tablet at bedtime 60 tablet 2    topiramate (TOPAMAX) 100 MG tablet Take 1 tablet by mouth daily (Start after completing the previous prescription) 30 tablet 3    omeprazole (PRILOSEC) 20 MG delayed release capsule Take 1 capsule by mouth every morning (before breakfast) 90 capsule 2    DULoxetine (CYMBALTA) 60 MG extended release capsule Take 1 capsule by mouth daily 90 capsule 1    Ibuprofen-Acetaminophen (ADVIL DUAL ACTION) 125-250 MG TABS Take 2 tablets by mouth every 8 hours as needed      vitamin D (D3-1000) 25 MCG (1000 UT) TABS tablet Take 1,000 Units by mouth daily      clobetasol propionate 0.05 % LOTN lotion Apply to affected area twice daily for psoriasis. Avoid use on face. 118 mL 0    Multiple Vitamins-Minerals (THERAPEUTIC MULTIVITAMIN-MINERALS) tablet Take 1 tablet by mouth daily       No current facility-administered medications for this visit.        No Known Allergies    Past Surgical History:   Procedure Laterality Date    CHOLECYSTECTOMY      HYSTERECTOMY (CERVIX STATUS UNKNOWN)      HYSTERECTOMY, VAGINAL      No BSO    LITHOTRIPSY      TOTAL HIP ARTHROPLASTY Right 2/27/2020    HIP TOTAL ARTHROPLASTY ANTERIOR APPROACH performed by Buzzy Bernheim, MD at [facility] Left 5/12/2022    LEFT HIP TOTAL ARTHROPLASTY performed by Buzzy Bernheim, MD at [address] [place] [address] OR       Social History     Tobacco Use    Smoking status: Former     Packs/day: 1.00     Years: 30.00     Pack years: 30.00     Types: Cigarettes     Quit date: 12/1/2019     Years since quitting: 3.2    Smokeless tobacco: Never   Vaping Use    Vaping Use: Never used   Substance Use Topics    Alcohol use: Not Currently    Drug use: Never       Family History   Problem Relation Age of Onset    Breast Cancer Paternal Grandmother     Hypertension Mother     High Cholesterol Mother     Hypertension Father     Other Father         GERD, nephrolithiasis    High Cholesterol Father     Diabetes type 2  Father     Coronary Art Dis Father     High Blood Pressure Sister     Other Sister         GERD       /70   Pulse 92   Temp 98 °F (36.7 °C)   Ht 5' 5\" (1.651 m)   Wt 217 lb (98.4 kg)   SpO2 96%   BMI 36.11 kg/m²     Physical Exam  Vitals reviewed. Constitutional:       General: She is not in acute distress. Appearance: Normal appearance. She is well-developed. HENT:      Head: Normocephalic. Eyes:      Conjunctiva/sclera: Conjunctivae normal.      Pupils: Pupils are equal, round, and reactive to light. Neck:      Thyroid: No thyromegaly. Vascular: No carotid bruit or JVD. Trachea: No tracheal deviation. Cardiovascular:      Rate and Rhythm: Normal rate and regular rhythm. Heart sounds: Normal heart sounds. No murmur heard. Pulmonary:      Effort: Pulmonary effort is normal. No respiratory distress. Breath sounds: Normal breath sounds. No wheezing or rhonchi. Musculoskeletal:         General: Normal range of motion. Cervical back: Normal range of motion and neck supple. Lymphadenopathy:      Cervical: No cervical adenopathy. Skin:     General: Skin is warm and dry. Findings: No rash. Neurological:      Mental Status: She is alert. Psychiatric:         Mood and Affect: Mood normal.         Behavior: Behavior normal.         Thought Content: Thought content normal.       ASSESSMENT/PLAN:  1. Chronic tension-type headache, not intractable  -Improving, continue topiramate 100 mg daily. Continue to monitor.  -Add Maxalt 10 mg daily prn for more intense headaches. SE profile reviewed. Dosing reviewed. -Advised that she also discuss further with pain management as I do feel a lot of this is stemming from her chronic neck pain. -Reassess in 3 months, sooner with problems.     2. Chronic neck pain  -Continue management per pain management    3. Familial hyperlipidemia  -Continue to work on low-fat diet, weight loss. -She will get fasting lab as previously ordered. Return in about 3 months (around 5/13/2023) for follow up, 30 minute visit. Avelina was seen today for follow-up. Diagnoses and all orders for this visit:    Chronic tension-type headache, not intractable    Chronic neck pain    Familial hyperlipidemia    Other orders  -     rizatriptan (MAXALT) 10 MG tablet; Take 1 tablet by mouth once as needed for Migraine May repeat in 2 hours if needed    Medications Discontinued During This Encounter   Medication Reason    topiramate (TOPAMAX) 25 MG tablet Therapy completed     There are no Patient Instructions on file for this visit. Patient voicesunderstanding and agrees to plans along with risks and benefits of plan. Counseling:  Avelina Torres's case, medications and options were discussed in detail. Patient was instructed to call the office if she questionsregarding her treatment. Should her conditions worsen, she should return to office to be reassessed by JAEL Richter. she Should to go the closest Emergency Department for any emergency. They verbalizedunderstanding the above instructions. Return in about 3 months (around 5/13/2023) for follow up, 30 minute visit.

## 2023-02-25 RX ORDER — DULOXETIN HYDROCHLORIDE 60 MG/1
60 CAPSULE, DELAYED RELEASE ORAL DAILY
Qty: 90 CAPSULE | Refills: 1 | Status: SHIPPED | OUTPATIENT
Start: 2023-02-25

## 2023-03-15 DIAGNOSIS — M54.6 CHRONIC MIDLINE THORACIC BACK PAIN: ICD-10-CM

## 2023-03-15 DIAGNOSIS — M51.36 DEGENERATIVE DISC DISEASE, LUMBAR: ICD-10-CM

## 2023-03-15 DIAGNOSIS — G89.29 CHRONIC MIDLINE THORACIC BACK PAIN: ICD-10-CM

## 2023-03-15 RX ORDER — HYDROCODONE BITARTRATE AND ACETAMINOPHEN 7.5; 325 MG/1; MG/1
1 TABLET ORAL EVERY 6 HOURS PRN
Qty: 115 TABLET | Refills: 0 | Status: SHIPPED | OUTPATIENT
Start: 2023-03-22 | End: 2023-04-21

## 2023-03-27 ENCOUNTER — TELEPHONE (OUTPATIENT)
Dept: PAIN MANAGEMENT | Age: 53
End: 2023-03-27

## 2023-03-27 ENCOUNTER — HOSPITAL ENCOUNTER (OUTPATIENT)
Dept: PAIN MANAGEMENT | Age: 53
Discharge: HOME | End: 2023-03-27

## 2023-04-03 RX ORDER — TOPIRAMATE 100 MG/1
TABLET, FILM COATED ORAL
Qty: 30 TABLET | Refills: 3 | Status: SHIPPED | OUTPATIENT
Start: 2023-04-03

## 2023-04-17 ENCOUNTER — ANESTHESIA (OUTPATIENT)
Dept: OPERATING ROOM | Age: 53
End: 2023-04-17
Payer: MEDICAID

## 2023-04-17 ENCOUNTER — APPOINTMENT (OUTPATIENT)
Dept: GENERAL RADIOLOGY | Age: 53
End: 2023-04-17
Payer: MEDICAID

## 2023-04-17 ENCOUNTER — HOSPITAL ENCOUNTER (INPATIENT)
Age: 53
LOS: 3 days | Discharge: HOME | End: 2023-04-20
Attending: EMERGENCY MEDICINE | Admitting: SURGERY
Payer: MEDICAID

## 2023-04-17 ENCOUNTER — ANESTHESIA EVENT (OUTPATIENT)
Dept: OPERATING ROOM | Age: 53
End: 2023-04-17
Payer: MEDICAID

## 2023-04-17 ENCOUNTER — APPOINTMENT (OUTPATIENT)
Dept: CT IMAGING | Age: 53
End: 2023-04-17
Payer: MEDICAID

## 2023-04-17 DIAGNOSIS — K63.1 SMALL BOWEL PERFORATION (HCC): Primary | ICD-10-CM

## 2023-04-17 DIAGNOSIS — R19.8 PERFORATED VISCUS: ICD-10-CM

## 2023-04-17 PROBLEM — E66.812 CLASS 2 OBESITY IN ADULT: Status: ACTIVE | Noted: 2023-04-17

## 2023-04-17 PROBLEM — E66.9 CLASS 2 OBESITY IN ADULT: Status: ACTIVE | Noted: 2023-04-17

## 2023-04-17 LAB
ALBUMIN SERPL-MCNC: 4.1 G/DL (ref 3.5–5.2)
ALP SERPL-CCNC: 108 U/L (ref 35–104)
ALT SERPL-CCNC: 12 U/L (ref 5–33)
ANION GAP SERPL CALCULATED.3IONS-SCNC: 11 MMOL/L (ref 7–19)
AST SERPL-CCNC: 16 U/L (ref 5–32)
BACTERIA URNS QL MICRO: ABNORMAL /HPF
BASOPHILS # BLD: 0 K/UL (ref 0–0.2)
BASOPHILS NFR BLD: 0.2 % (ref 0–1)
BILIRUB SERPL-MCNC: 0.5 MG/DL (ref 0.2–1.2)
BILIRUB UR QL STRIP: NEGATIVE
BUN SERPL-MCNC: 12 MG/DL (ref 6–20)
CALCIUM SERPL-MCNC: 9.4 MG/DL (ref 8.6–10)
CHLORIDE SERPL-SCNC: 106 MMOL/L (ref 98–111)
CLARITY UR: ABNORMAL
CO2 SERPL-SCNC: 24 MMOL/L (ref 22–29)
COLOR UR: YELLOW
CREAT SERPL-MCNC: 0.8 MG/DL (ref 0.5–0.9)
CRYSTALS URNS MICRO: ABNORMAL /HPF
EOSINOPHIL # BLD: 0.1 K/UL (ref 0–0.6)
EOSINOPHIL NFR BLD: 0.8 % (ref 0–5)
EPI CELLS #/AREA URNS AUTO: 15 /HPF (ref 0–5)
ERYTHROCYTE [DISTWIDTH] IN BLOOD BY AUTOMATED COUNT: 13 % (ref 11.5–14.5)
GLUCOSE SERPL-MCNC: 108 MG/DL (ref 74–109)
GLUCOSE UR STRIP.AUTO-MCNC: NEGATIVE MG/DL
HCT VFR BLD AUTO: 41.8 % (ref 37–47)
HGB BLD-MCNC: 13.5 G/DL (ref 12–16)
HGB UR STRIP.AUTO-MCNC: ABNORMAL MG/L
HYALINE CASTS #/AREA URNS AUTO: 5 /HPF (ref 0–8)
IMM GRANULOCYTES # BLD: 0 K/UL
KETONES UR STRIP.AUTO-MCNC: NEGATIVE MG/DL
LACTATE BLDV-SCNC: 1.3 MMOL/L (ref 0.5–1.9)
LEUKOCYTE ESTERASE UR QL STRIP.AUTO: ABNORMAL
LIPASE SERPL-CCNC: 17 U/L (ref 13–60)
LYMPHOCYTES # BLD: 1 K/UL (ref 1.1–4.5)
LYMPHOCYTES NFR BLD: 9.7 % (ref 20–40)
MCH RBC QN AUTO: 29.7 PG (ref 27–31)
MCHC RBC AUTO-ENTMCNC: 32.3 G/DL (ref 33–37)
MCV RBC AUTO: 92.1 FL (ref 81–99)
MONOCYTES # BLD: 0.7 K/UL (ref 0–0.9)
MONOCYTES NFR BLD: 6.5 % (ref 0–10)
NEUTROPHILS # BLD: 8.6 K/UL (ref 1.5–7.5)
NEUTS SEG NFR BLD: 82.5 % (ref 50–65)
NITRITE UR QL STRIP.AUTO: NEGATIVE
PH UR STRIP.AUTO: 6.5 [PH] (ref 5–8)
PLATELET # BLD AUTO: 182 K/UL (ref 130–400)
PMV BLD AUTO: 9.9 FL (ref 9.4–12.3)
POTASSIUM SERPL-SCNC: 3.3 MMOL/L (ref 3.5–5)
PROT SERPL-MCNC: 7.1 G/DL (ref 6.6–8.7)
PROT UR STRIP.AUTO-MCNC: NEGATIVE MG/DL
RBC # BLD AUTO: 4.54 M/UL (ref 4.2–5.4)
RBC #/AREA URNS AUTO: 1 /HPF (ref 0–4)
SARS-COV-2 RDRP RESP QL NAA+PROBE: NOT DETECTED
SODIUM SERPL-SCNC: 141 MMOL/L (ref 136–145)
SP GR UR STRIP.AUTO: 1.02 (ref 1–1.03)
UROBILINOGEN UR STRIP.AUTO-MCNC: 0.2 E.U./DL
WBC # BLD AUTO: 10.4 K/UL (ref 4.8–10.8)
WBC #/AREA URNS AUTO: 7 /HPF (ref 0–5)

## 2023-04-17 PROCEDURE — 2580000003 HC RX 258: Performed by: EMERGENCY MEDICINE

## 2023-04-17 PROCEDURE — 80053 COMPREHEN METABOLIC PANEL: CPT

## 2023-04-17 PROCEDURE — 2500000003 HC RX 250 WO HCPCS: Performed by: ANESTHESIOLOGY

## 2023-04-17 PROCEDURE — 2580000003 HC RX 258: Performed by: ANESTHESIOLOGY

## 2023-04-17 PROCEDURE — 7100000001 HC PACU RECOVERY - ADDTL 15 MIN: Performed by: SURGERY

## 2023-04-17 PROCEDURE — 99223 1ST HOSP IP/OBS HIGH 75: CPT | Performed by: SURGERY

## 2023-04-17 PROCEDURE — 3700000001 HC ADD 15 MINUTES (ANESTHESIA): Performed by: SURGERY

## 2023-04-17 PROCEDURE — 71045 X-RAY EXAM CHEST 1 VIEW: CPT

## 2023-04-17 PROCEDURE — 3700000000 HC ANESTHESIA ATTENDED CARE: Performed by: SURGERY

## 2023-04-17 PROCEDURE — 83605 ASSAY OF LACTIC ACID: CPT

## 2023-04-17 PROCEDURE — 2500000003 HC RX 250 WO HCPCS: Performed by: SURGERY

## 2023-04-17 PROCEDURE — 2709999900 HC NON-CHARGEABLE SUPPLY: Performed by: SURGERY

## 2023-04-17 PROCEDURE — 87040 BLOOD CULTURE FOR BACTERIA: CPT

## 2023-04-17 PROCEDURE — 8E0W4CZ ROBOTIC ASSISTED PROCEDURE OF TRUNK REGION, PERCUTANEOUS ENDOSCOPIC APPROACH: ICD-10-PCS | Performed by: SURGERY

## 2023-04-17 PROCEDURE — 7100000000 HC PACU RECOVERY - FIRST 15 MIN: Performed by: SURGERY

## 2023-04-17 PROCEDURE — 6360000002 HC RX W HCPCS: Performed by: ANESTHESIOLOGY

## 2023-04-17 PROCEDURE — 36415 COLL VENOUS BLD VENIPUNCTURE: CPT

## 2023-04-17 PROCEDURE — 1210000000 HC MED SURG R&B

## 2023-04-17 PROCEDURE — 44120 REMOVAL OF SMALL INTESTINE: CPT | Performed by: SURGERY

## 2023-04-17 PROCEDURE — 88307 TISSUE EXAM BY PATHOLOGIST: CPT

## 2023-04-17 PROCEDURE — 6360000004 HC RX CONTRAST MEDICATION: Performed by: EMERGENCY MEDICINE

## 2023-04-17 PROCEDURE — 96374 THER/PROPH/DIAG INJ IV PUSH: CPT

## 2023-04-17 PROCEDURE — 87635 SARS-COV-2 COVID-19 AMP PRB: CPT

## 2023-04-17 PROCEDURE — A4216 STERILE WATER/SALINE, 10 ML: HCPCS | Performed by: ANESTHESIOLOGY

## 2023-04-17 PROCEDURE — S2900 ROBOTIC SURGICAL SYSTEM: HCPCS | Performed by: SURGERY

## 2023-04-17 PROCEDURE — 6360000002 HC RX W HCPCS: Performed by: SURGERY

## 2023-04-17 PROCEDURE — 2580000003 HC RX 258: Performed by: SURGERY

## 2023-04-17 PROCEDURE — 0DT84ZZ RESECTION OF SMALL INTESTINE, PERCUTANEOUS ENDOSCOPIC APPROACH: ICD-10-PCS | Performed by: SURGERY

## 2023-04-17 PROCEDURE — 2720000010 HC SURG SUPPLY STERILE: Performed by: SURGERY

## 2023-04-17 PROCEDURE — 81001 URINALYSIS AUTO W/SCOPE: CPT

## 2023-04-17 PROCEDURE — 3600000019 HC SURGERY ROBOT ADDTL 15MIN: Performed by: SURGERY

## 2023-04-17 PROCEDURE — 6360000002 HC RX W HCPCS: Performed by: EMERGENCY MEDICINE

## 2023-04-17 PROCEDURE — 6370000000 HC RX 637 (ALT 250 FOR IP): Performed by: ANESTHESIOLOGY

## 2023-04-17 PROCEDURE — 85025 COMPLETE CBC W/AUTO DIFF WBC: CPT

## 2023-04-17 PROCEDURE — 74177 CT ABD & PELVIS W/CONTRAST: CPT

## 2023-04-17 PROCEDURE — 99285 EMERGENCY DEPT VISIT HI MDM: CPT

## 2023-04-17 PROCEDURE — 96375 TX/PRO/DX INJ NEW DRUG ADDON: CPT

## 2023-04-17 PROCEDURE — 83690 ASSAY OF LIPASE: CPT

## 2023-04-17 PROCEDURE — 3600000009 HC SURGERY ROBOT BASE: Performed by: SURGERY

## 2023-04-17 RX ORDER — FENTANYL CITRATE 50 UG/ML
INJECTION, SOLUTION INTRAMUSCULAR; INTRAVENOUS PRN
Status: DISCONTINUED | OUTPATIENT
Start: 2023-04-17 | End: 2023-04-17 | Stop reason: SDUPTHER

## 2023-04-17 RX ORDER — MEPERIDINE HYDROCHLORIDE 25 MG/ML
12.5 INJECTION INTRAMUSCULAR; INTRAVENOUS; SUBCUTANEOUS EVERY 5 MIN PRN
Status: DISCONTINUED | OUTPATIENT
Start: 2023-04-17 | End: 2023-04-17 | Stop reason: HOSPADM

## 2023-04-17 RX ORDER — SODIUM CHLORIDE 0.9 % (FLUSH) 0.9 %
5-40 SYRINGE (ML) INJECTION EVERY 12 HOURS SCHEDULED
Status: DISCONTINUED | OUTPATIENT
Start: 2023-04-17 | End: 2023-04-17 | Stop reason: HOSPADM

## 2023-04-17 RX ORDER — HYDROMORPHONE HYDROCHLORIDE 1 MG/ML
0.5 INJECTION, SOLUTION INTRAMUSCULAR; INTRAVENOUS; SUBCUTANEOUS EVERY 5 MIN PRN
Status: DISCONTINUED | OUTPATIENT
Start: 2023-04-17 | End: 2023-04-17 | Stop reason: HOSPADM

## 2023-04-17 RX ORDER — SCOLOPAMINE TRANSDERMAL SYSTEM 1 MG/1
1 PATCH, EXTENDED RELEASE TRANSDERMAL
Status: DISCONTINUED | OUTPATIENT
Start: 2023-04-17 | End: 2023-04-17

## 2023-04-17 RX ORDER — SENNA AND DOCUSATE SODIUM 50; 8.6 MG/1; MG/1
1 TABLET, FILM COATED ORAL 2 TIMES DAILY
Status: DISCONTINUED | OUTPATIENT
Start: 2023-04-17 | End: 2023-04-20 | Stop reason: HOSPADM

## 2023-04-17 RX ORDER — DIPHENHYDRAMINE HYDROCHLORIDE 50 MG/ML
12.5 INJECTION INTRAMUSCULAR; INTRAVENOUS
Status: DISCONTINUED | OUTPATIENT
Start: 2023-04-17 | End: 2023-04-17 | Stop reason: HOSPADM

## 2023-04-17 RX ORDER — 0.9 % SODIUM CHLORIDE 0.9 %
1000 INTRAVENOUS SOLUTION INTRAVENOUS ONCE
Status: DISCONTINUED | OUTPATIENT
Start: 2023-04-17 | End: 2023-04-17

## 2023-04-17 RX ORDER — SODIUM CHLORIDE 0.9 % (FLUSH) 0.9 %
5-40 SYRINGE (ML) INJECTION PRN
Status: DISCONTINUED | OUTPATIENT
Start: 2023-04-17 | End: 2023-04-17 | Stop reason: HOSPADM

## 2023-04-17 RX ORDER — 0.9 % SODIUM CHLORIDE 0.9 %
30 INTRAVENOUS SOLUTION INTRAVENOUS ONCE
Status: COMPLETED | OUTPATIENT
Start: 2023-04-17 | End: 2023-04-17

## 2023-04-17 RX ORDER — LIDOCAINE HYDROCHLORIDE 10 MG/ML
1 INJECTION, SOLUTION EPIDURAL; INFILTRATION; INTRACAUDAL; PERINEURAL
Status: DISCONTINUED | OUTPATIENT
Start: 2023-04-17 | End: 2023-04-17 | Stop reason: HOSPADM

## 2023-04-17 RX ORDER — SODIUM CHLORIDE, SODIUM LACTATE, POTASSIUM CHLORIDE, CALCIUM CHLORIDE 600; 310; 30; 20 MG/100ML; MG/100ML; MG/100ML; MG/100ML
INJECTION, SOLUTION INTRAVENOUS CONTINUOUS
Status: DISCONTINUED | OUTPATIENT
Start: 2023-04-17 | End: 2023-04-19

## 2023-04-17 RX ORDER — SODIUM CHLORIDE, SODIUM LACTATE, POTASSIUM CHLORIDE, CALCIUM CHLORIDE 600; 310; 30; 20 MG/100ML; MG/100ML; MG/100ML; MG/100ML
INJECTION, SOLUTION INTRAVENOUS CONTINUOUS PRN
Status: DISCONTINUED | OUTPATIENT
Start: 2023-04-17 | End: 2023-04-17 | Stop reason: SDUPTHER

## 2023-04-17 RX ORDER — INDOCYANINE GREEN AND WATER 25 MG
KIT INJECTION PRN
Status: DISCONTINUED | OUTPATIENT
Start: 2023-04-17 | End: 2023-04-17 | Stop reason: ALTCHOICE

## 2023-04-17 RX ORDER — BUPIVACAINE HYDROCHLORIDE 2.5 MG/ML
INJECTION, SOLUTION INFILTRATION; PERINEURAL PRN
Status: DISCONTINUED | OUTPATIENT
Start: 2023-04-17 | End: 2023-04-17 | Stop reason: ALTCHOICE

## 2023-04-17 RX ORDER — FENTANYL CITRATE 50 UG/ML
50 INJECTION, SOLUTION INTRAMUSCULAR; INTRAVENOUS EVERY 5 MIN PRN
Status: DISCONTINUED | OUTPATIENT
Start: 2023-04-17 | End: 2023-04-17

## 2023-04-17 RX ORDER — SODIUM CHLORIDE, SODIUM LACTATE, POTASSIUM CHLORIDE, CALCIUM CHLORIDE 600; 310; 30; 20 MG/100ML; MG/100ML; MG/100ML; MG/100ML
INJECTION, SOLUTION INTRAVENOUS CONTINUOUS
Status: DISCONTINUED | OUTPATIENT
Start: 2023-04-17 | End: 2023-04-17 | Stop reason: SDUPTHER

## 2023-04-17 RX ORDER — SODIUM CHLORIDE 9 MG/ML
INJECTION, SOLUTION INTRAVENOUS PRN
Status: DISCONTINUED | OUTPATIENT
Start: 2023-04-17 | End: 2023-04-17 | Stop reason: HOSPADM

## 2023-04-17 RX ORDER — MIDAZOLAM HYDROCHLORIDE 2 MG/2ML
2 INJECTION, SOLUTION INTRAMUSCULAR; INTRAVENOUS
Status: DISCONTINUED | OUTPATIENT
Start: 2023-04-17 | End: 2023-04-17 | Stop reason: HOSPADM

## 2023-04-17 RX ORDER — SODIUM CHLORIDE 9 MG/ML
INJECTION, SOLUTION INTRAVENOUS PRN
Status: DISCONTINUED | OUTPATIENT
Start: 2023-04-17 | End: 2023-04-20 | Stop reason: HOSPADM

## 2023-04-17 RX ORDER — BISACODYL 5 MG/1
5 TABLET, DELAYED RELEASE ORAL DAILY
Status: DISCONTINUED | OUTPATIENT
Start: 2023-04-17 | End: 2023-04-20

## 2023-04-17 RX ORDER — FENTANYL CITRATE 50 UG/ML
25 INJECTION, SOLUTION INTRAMUSCULAR; INTRAVENOUS EVERY 5 MIN PRN
Status: DISCONTINUED | OUTPATIENT
Start: 2023-04-17 | End: 2023-04-17

## 2023-04-17 RX ORDER — FENTANYL CITRATE 50 UG/ML
50 INJECTION, SOLUTION INTRAMUSCULAR; INTRAVENOUS
Status: DISCONTINUED | OUTPATIENT
Start: 2023-04-17 | End: 2023-04-17 | Stop reason: HOSPADM

## 2023-04-17 RX ORDER — HYDROMORPHONE HYDROCHLORIDE 1 MG/ML
0.25 INJECTION, SOLUTION INTRAMUSCULAR; INTRAVENOUS; SUBCUTANEOUS EVERY 5 MIN PRN
Status: DISCONTINUED | OUTPATIENT
Start: 2023-04-17 | End: 2023-04-17 | Stop reason: HOSPADM

## 2023-04-17 RX ORDER — ROCURONIUM BROMIDE 10 MG/ML
INJECTION, SOLUTION INTRAVENOUS PRN
Status: DISCONTINUED | OUTPATIENT
Start: 2023-04-17 | End: 2023-04-17 | Stop reason: SDUPTHER

## 2023-04-17 RX ORDER — SODIUM CHLORIDE 0.9 % (FLUSH) 0.9 %
5-40 SYRINGE (ML) INJECTION EVERY 12 HOURS SCHEDULED
Status: DISCONTINUED | OUTPATIENT
Start: 2023-04-17 | End: 2023-04-20 | Stop reason: HOSPADM

## 2023-04-17 RX ORDER — MAGNESIUM SULFATE IN WATER 40 MG/ML
2000 INJECTION, SOLUTION INTRAVENOUS PRN
Status: DISCONTINUED | OUTPATIENT
Start: 2023-04-17 | End: 2023-04-20 | Stop reason: HOSPADM

## 2023-04-17 RX ORDER — ACETAMINOPHEN 500 MG
1000 TABLET ORAL EVERY 8 HOURS SCHEDULED
Status: DISCONTINUED | OUTPATIENT
Start: 2023-04-17 | End: 2023-04-20 | Stop reason: HOSPADM

## 2023-04-17 RX ORDER — DEXAMETHASONE SODIUM PHOSPHATE 10 MG/ML
INJECTION, SOLUTION INTRAMUSCULAR; INTRAVENOUS PRN
Status: DISCONTINUED | OUTPATIENT
Start: 2023-04-17 | End: 2023-04-17 | Stop reason: SDUPTHER

## 2023-04-17 RX ORDER — ENOXAPARIN SODIUM 100 MG/ML
40 INJECTION SUBCUTANEOUS DAILY
Status: DISCONTINUED | OUTPATIENT
Start: 2023-04-17 | End: 2023-04-17 | Stop reason: SDUPTHER

## 2023-04-17 RX ORDER — MORPHINE SULFATE 4 MG/ML
4 INJECTION, SOLUTION INTRAMUSCULAR; INTRAVENOUS ONCE
Status: COMPLETED | OUTPATIENT
Start: 2023-04-17 | End: 2023-04-17

## 2023-04-17 RX ORDER — HYDROMORPHONE HYDROCHLORIDE 1 MG/ML
1 INJECTION, SOLUTION INTRAMUSCULAR; INTRAVENOUS; SUBCUTANEOUS
Status: DISCONTINUED | OUTPATIENT
Start: 2023-04-17 | End: 2023-04-20

## 2023-04-17 RX ORDER — MIDAZOLAM HYDROCHLORIDE 1 MG/ML
INJECTION INTRAMUSCULAR; INTRAVENOUS PRN
Status: DISCONTINUED | OUTPATIENT
Start: 2023-04-17 | End: 2023-04-17 | Stop reason: SDUPTHER

## 2023-04-17 RX ORDER — LIDOCAINE HYDROCHLORIDE 10 MG/ML
INJECTION, SOLUTION INFILTRATION; PERINEURAL PRN
Status: DISCONTINUED | OUTPATIENT
Start: 2023-04-17 | End: 2023-04-17 | Stop reason: SDUPTHER

## 2023-04-17 RX ORDER — POTASSIUM CHLORIDE 7.45 MG/ML
10 INJECTION INTRAVENOUS PRN
Status: DISCONTINUED | OUTPATIENT
Start: 2023-04-17 | End: 2023-04-20 | Stop reason: HOSPADM

## 2023-04-17 RX ORDER — ONDANSETRON 4 MG/1
4 TABLET, ORALLY DISINTEGRATING ORAL EVERY 8 HOURS PRN
Status: DISCONTINUED | OUTPATIENT
Start: 2023-04-17 | End: 2023-04-17 | Stop reason: SDUPTHER

## 2023-04-17 RX ORDER — ONDANSETRON 2 MG/ML
INJECTION INTRAMUSCULAR; INTRAVENOUS PRN
Status: DISCONTINUED | OUTPATIENT
Start: 2023-04-17 | End: 2023-04-17 | Stop reason: SDUPTHER

## 2023-04-17 RX ORDER — ONDANSETRON 2 MG/ML
4 INJECTION INTRAMUSCULAR; INTRAVENOUS EVERY 6 HOURS PRN
Status: DISCONTINUED | OUTPATIENT
Start: 2023-04-17 | End: 2023-04-20 | Stop reason: HOSPADM

## 2023-04-17 RX ORDER — PROCHLORPERAZINE EDISYLATE 5 MG/ML
5 INJECTION INTRAMUSCULAR; INTRAVENOUS
Status: DISCONTINUED | OUTPATIENT
Start: 2023-04-17 | End: 2023-04-17 | Stop reason: HOSPADM

## 2023-04-17 RX ORDER — PROPOFOL 10 MG/ML
INJECTION, EMULSION INTRAVENOUS PRN
Status: DISCONTINUED | OUTPATIENT
Start: 2023-04-17 | End: 2023-04-17 | Stop reason: SDUPTHER

## 2023-04-17 RX ORDER — ENOXAPARIN SODIUM 100 MG/ML
40 INJECTION SUBCUTANEOUS DAILY
Status: DISCONTINUED | OUTPATIENT
Start: 2023-04-18 | End: 2023-04-20 | Stop reason: HOSPADM

## 2023-04-17 RX ORDER — ONDANSETRON 2 MG/ML
4 INJECTION INTRAMUSCULAR; INTRAVENOUS ONCE
Status: COMPLETED | OUTPATIENT
Start: 2023-04-17 | End: 2023-04-17

## 2023-04-17 RX ORDER — ONDANSETRON 2 MG/ML
4 INJECTION INTRAMUSCULAR; INTRAVENOUS EVERY 6 HOURS PRN
Status: DISCONTINUED | OUTPATIENT
Start: 2023-04-17 | End: 2023-04-17 | Stop reason: SDUPTHER

## 2023-04-17 RX ORDER — ONDANSETRON 2 MG/ML
4 INJECTION INTRAMUSCULAR; INTRAVENOUS
Status: DISCONTINUED | OUTPATIENT
Start: 2023-04-17 | End: 2023-04-17 | Stop reason: HOSPADM

## 2023-04-17 RX ORDER — SODIUM CHLORIDE 0.9 % (FLUSH) 0.9 %
5-40 SYRINGE (ML) INJECTION PRN
Status: DISCONTINUED | OUTPATIENT
Start: 2023-04-17 | End: 2023-04-20 | Stop reason: HOSPADM

## 2023-04-17 RX ORDER — POLYETHYLENE GLYCOL 3350 17 G/17G
17 POWDER, FOR SOLUTION ORAL DAILY
Status: DISCONTINUED | OUTPATIENT
Start: 2023-04-17 | End: 2023-04-20 | Stop reason: HOSPADM

## 2023-04-17 RX ORDER — FAMOTIDINE 20 MG/1
20 TABLET, FILM COATED ORAL 2 TIMES DAILY
Status: DISCONTINUED | OUTPATIENT
Start: 2023-04-17 | End: 2023-04-20 | Stop reason: HOSPADM

## 2023-04-17 RX ORDER — FENTANYL CITRATE 50 UG/ML
25 INJECTION, SOLUTION INTRAMUSCULAR; INTRAVENOUS
Status: DISCONTINUED | OUTPATIENT
Start: 2023-04-17 | End: 2023-04-17 | Stop reason: HOSPADM

## 2023-04-17 RX ORDER — ONDANSETRON 4 MG/1
4 TABLET, ORALLY DISINTEGRATING ORAL EVERY 8 HOURS PRN
Status: DISCONTINUED | OUTPATIENT
Start: 2023-04-17 | End: 2023-04-20 | Stop reason: HOSPADM

## 2023-04-17 RX ADMIN — MORPHINE SULFATE 4 MG: 4 INJECTION, SOLUTION INTRAMUSCULAR; INTRAVENOUS at 08:25

## 2023-04-17 RX ADMIN — LIDOCAINE HYDROCHLORIDE 5 ML: 10 INJECTION, SOLUTION INFILTRATION; PERINEURAL at 13:08

## 2023-04-17 RX ADMIN — FENTANYL CITRATE 50 MCG: 0.05 INJECTION, SOLUTION INTRAMUSCULAR; INTRAVENOUS at 14:25

## 2023-04-17 RX ADMIN — ROCURONIUM BROMIDE 10 MG: 10 INJECTION, SOLUTION INTRAVENOUS at 14:48

## 2023-04-17 RX ADMIN — SODIUM CHLORIDE 2967 ML: 9 INJECTION, SOLUTION INTRAVENOUS at 08:34

## 2023-04-17 RX ADMIN — SODIUM CHLORIDE, POTASSIUM CHLORIDE, SODIUM LACTATE AND CALCIUM CHLORIDE: 600; 310; 30; 20 INJECTION, SOLUTION INTRAVENOUS at 17:52

## 2023-04-17 RX ADMIN — FENTANYL CITRATE 50 MCG: 0.05 INJECTION, SOLUTION INTRAMUSCULAR; INTRAVENOUS at 13:16

## 2023-04-17 RX ADMIN — ONDANSETRON 4 MG: 2 INJECTION INTRAMUSCULAR; INTRAVENOUS at 14:56

## 2023-04-17 RX ADMIN — ONDANSETRON 4 MG: 2 INJECTION INTRAMUSCULAR; INTRAVENOUS at 08:24

## 2023-04-17 RX ADMIN — PIPERACILLIN AND TAZOBACTAM 3375 MG: 3; .375 INJECTION, POWDER, LYOPHILIZED, FOR SOLUTION INTRAVENOUS at 10:29

## 2023-04-17 RX ADMIN — SODIUM CHLORIDE 1000 ML: 9 INJECTION, SOLUTION INTRAVENOUS at 08:25

## 2023-04-17 RX ADMIN — HYDROMORPHONE HYDROCHLORIDE 0.25 MG: 1 INJECTION, SOLUTION INTRAMUSCULAR; INTRAVENOUS; SUBCUTANEOUS at 16:16

## 2023-04-17 RX ADMIN — SODIUM CHLORIDE, PRESERVATIVE FREE 20 MG: 5 INJECTION INTRAVENOUS at 21:28

## 2023-04-17 RX ADMIN — PIPERACILLIN AND TAZOBACTAM 3375 MG: 3; .375 INJECTION, POWDER, LYOPHILIZED, FOR SOLUTION INTRAVENOUS at 18:32

## 2023-04-17 RX ADMIN — FENTANYL CITRATE 50 MCG: 0.05 INJECTION, SOLUTION INTRAMUSCULAR; INTRAVENOUS at 15:26

## 2023-04-17 RX ADMIN — IOPAMIDOL 75 ML: 755 INJECTION, SOLUTION INTRAVENOUS at 09:27

## 2023-04-17 RX ADMIN — SODIUM CHLORIDE, SODIUM LACTATE, POTASSIUM CHLORIDE, AND CALCIUM CHLORIDE: 600; 310; 30; 20 INJECTION, SOLUTION INTRAVENOUS at 12:59

## 2023-04-17 RX ADMIN — HYDROMORPHONE HYDROCHLORIDE 0.25 MG: 1 INJECTION, SOLUTION INTRAMUSCULAR; INTRAVENOUS; SUBCUTANEOUS at 15:58

## 2023-04-17 RX ADMIN — FENTANYL CITRATE 50 MCG: 50 INJECTION, SOLUTION INTRAMUSCULAR; INTRAVENOUS at 12:19

## 2023-04-17 RX ADMIN — SODIUM CHLORIDE, SODIUM LACTATE, POTASSIUM CHLORIDE, AND CALCIUM CHLORIDE: 600; 310; 30; 20 INJECTION, SOLUTION INTRAVENOUS at 13:52

## 2023-04-17 RX ADMIN — ROCURONIUM BROMIDE 10 MG: 10 INJECTION, SOLUTION INTRAVENOUS at 14:00

## 2023-04-17 RX ADMIN — SUGAMMADEX 200 MG: 100 INJECTION, SOLUTION INTRAVENOUS at 15:19

## 2023-04-17 RX ADMIN — DEXAMETHASONE SODIUM PHOSPHATE 10 MG: 10 INJECTION, SOLUTION INTRAMUSCULAR; INTRAVENOUS at 13:15

## 2023-04-17 RX ADMIN — HYDROMORPHONE HYDROCHLORIDE 0.5 MG: 1 INJECTION, SOLUTION INTRAMUSCULAR; INTRAVENOUS; SUBCUTANEOUS at 16:10

## 2023-04-17 RX ADMIN — PROPOFOL 150 MG: 10 INJECTION, EMULSION INTRAVENOUS at 13:08

## 2023-04-17 RX ADMIN — HYDROMORPHONE HYDROCHLORIDE 1 MG: 1 INJECTION, SOLUTION INTRAMUSCULAR; INTRAVENOUS; SUBCUTANEOUS at 21:28

## 2023-04-17 RX ADMIN — FAMOTIDINE 20 MG: 10 INJECTION, SOLUTION INTRAVENOUS at 12:15

## 2023-04-17 RX ADMIN — ROCURONIUM BROMIDE 50 MG: 10 INJECTION, SOLUTION INTRAVENOUS at 13:08

## 2023-04-17 RX ADMIN — FENTANYL CITRATE 50 MCG: 0.05 INJECTION, SOLUTION INTRAMUSCULAR; INTRAVENOUS at 13:08

## 2023-04-17 RX ADMIN — MIDAZOLAM 2 MG: 1 INJECTION INTRAMUSCULAR; INTRAVENOUS at 13:01

## 2023-04-17 ASSESSMENT — PAIN SCALES - GENERAL
PAINLEVEL_OUTOF10: 6
PAINLEVEL_OUTOF10: 8
PAINLEVEL_OUTOF10: 7
PAINLEVEL_OUTOF10: 6
PAINLEVEL_OUTOF10: 4

## 2023-04-17 ASSESSMENT — ENCOUNTER SYMPTOMS
EYE PAIN: 0
ABDOMINAL DISTENTION: 0
VOMITING: 0
SORE THROAT: 0
EYE REDNESS: 0
BACK PAIN: 1
DIARRHEA: 0
COLOR CHANGE: 0
SHORTNESS OF BREATH: 0
RHINORRHEA: 0
CONSTIPATION: 1
COUGH: 0
ABDOMINAL PAIN: 1
BACK PAIN: 0
SHORTNESS OF BREATH: 0
NAUSEA: 1
CHEST TIGHTNESS: 0

## 2023-04-17 ASSESSMENT — PAIN DESCRIPTION - LOCATION
LOCATION: ABDOMEN
LOCATION: ABDOMEN;HEAD
LOCATION: ABDOMEN

## 2023-04-17 ASSESSMENT — LIFESTYLE VARIABLES: SMOKING_STATUS: 0

## 2023-04-17 ASSESSMENT — PAIN DESCRIPTION - DESCRIPTORS: DESCRIPTORS: STABBING

## 2023-04-17 ASSESSMENT — PAIN DESCRIPTION - ORIENTATION: ORIENTATION: RIGHT;LOWER

## 2023-04-17 NOTE — ANESTHESIA POSTPROCEDURE EVALUATION
Department of Anesthesiology  Postprocedure Note    Patient: Maria Dolores Brand  MRN: [id number]  YOB: 1970  Date of evaluation: 4/17/2023      Procedure Summary     Date: 04/17/23 Room / Location: [address]    Anesthesia Start: 1259 Anesthesia Stop: 8533    Procedure: ROBOTIC DIAGNOSTIC LAPAROSCOPY, SMALL BOWEL RESECTION Diagnosis:       Abdominal pain, unspecified abdominal location      (Intestinal obstruction, unspecified cause, unspecified whether partial or complete ([facility] [address] [Z74.066])    Surgeons: Jori Loyola DO Responsible Provider: Loco Maldonado MD    Anesthesia Type: general ASA Status: 3 - Emergent          Anesthesia Type: No value filed.     Jhonatan Phase I: Jhonatan Score: 8    Jhonatan Phase II:        Anesthesia Post Evaluation    Patient location during evaluation: PACU  Patient participation: complete - patient participated  Level of consciousness: awake and alert  Pain score: 0  Airway patency: patent  Nausea & Vomiting: no nausea and no vomiting  Complications: no  Cardiovascular status: blood pressure returned to baseline and hemodynamically stable  Respiratory status: room air and acceptable  Hydration status: euvolemic

## 2023-04-17 NOTE — H&P
[facility] Surgery History & Physical    Chief Complaint:  Chief Complaint   Patient presents with    Abdominal Pain       SUBJECTIVE:  Ms. Reema Seals is a 47 y.o. female who presents today with about three days of constipation and abdominal pain. The pain is in the right side and severe when pressed upon. It started three days ago when her dog jumped on her abdomen. When it didn't resolve, she was concerned and came to the emergency room. She has never had this pain in the past. No radiation of pain. Denies vomiting, feels somewhat nauseated and not like eating. Past Medical History:   Diagnosis Date    Abnormal Pap smear of cervix     GERD (gastroesophageal reflux disease)     Hip pain     Hyperlipidemia     Hypertension     Patient thinks she has HTN but untreated    Nephrolithiasis     Primary osteoarthritis of right hip 12/3/2019    Psoriasis      Past Surgical History:   Procedure Laterality Date    CHOLECYSTECTOMY      HYSTERECTOMY (CERVIX STATUS UNKNOWN)      HYSTERECTOMY, VAGINAL      No BSO    LITHOTRIPSY      TOTAL HIP ARTHROPLASTY Right 2/27/2020    HIP TOTAL ARTHROPLASTY ANTERIOR APPROACH performed by Richard Colindres MD at [facility] Left 5/12/2022    LEFT HIP TOTAL ARTHROPLASTY performed by Richard Colindres MD at [address] [place] [address]     No current facility-administered medications for this encounter. Current Outpatient Medications   Medication Sig Dispense Refill    topiramate (TOPAMAX) 100 MG tablet TAKE 1 TABLET BY MOUTH DAILY. START AFTER COMPLETING THE PREVIOUS PRESCRIPTION 30 tablet 3    HYDROcodone-acetaminophen (NORCO) 7.5-325 MG per tablet Take 1 tablet by mouth every 6 hours as needed for Pain for up to 30 days.  (May fill 3/22/23) 115 tablet 0    DULoxetine (CYMBALTA) 60 MG extended release capsule TAKE 1 CAPSULE BY MOUTH DAILY 90 capsule 1    rizatriptan (MAXALT) 10 MG tablet Take 1 tablet by mouth once as needed for Migraine May repeat in 2 hours if

## 2023-04-17 NOTE — OP NOTE
Estimated intraoperative blood loss 2 mL. mitral regurgitation Avelina Torres tolerated her surgery well and she was taken to recovery in satisfactory condition.            ________________________________  Kirit Naranjo DO  24/83/44   3:33 PM      Electronically signed by Kirit Naranjo DO on 0/67/3225 at 3:32 PM

## 2023-04-17 NOTE — ED NOTES
Attempted to call report to 5th floor. Was placed on hold for 6 minutes. Will call again.       Antoinette Ramos RN  04/17/23 [id number]

## 2023-04-17 NOTE — ED PROVIDER NOTES
the abdomen. LABS:  Labs Reviewed   CBC WITH AUTO DIFFERENTIAL - Abnormal; Notable for the following components:       Result Value    MCHC 32.3 (*)     Neutrophils % 82.5 (*)     Lymphocytes % 9.7 (*)     Neutrophils Absolute 8.6 (*)     Lymphocytes Absolute 1.0 (*)     All other components within normal limits   COMPREHENSIVE METABOLIC PANEL - Abnormal; Notable for the following components:    Potassium 3.3 (*)     Alkaline Phosphatase 108 (*)     All other components within normal limits   URINALYSIS WITH REFLEX TO CULTURE - Abnormal; Notable for the following components:    Clarity, UA CLOUDY (*)     Blood, Urine SMALL (*)     Leukocyte Esterase, Urine MODERATE (*)     All other components within normal limits   MICROSCOPIC URINALYSIS - Abnormal; Notable for the following components:    Bacteria, UA 1+ (*)     Crystals, UA NEG (*)     WBC, UA 7 (*)     All other components within normal limits   COVID-19, RAPID   CULTURE, BLOOD 1   CULTURE, BLOOD 2   LIPASE   LACTIC ACID       All other labs were within normal range or not returned as of this dictation. EMERGENCY DEPARTMENT COURSE and DIFFERENTIAL DIAGNOSIS/MDM:   Vitals:    Vitals:    04/17/23 1605 04/17/23 1610 04/17/23 1615 04/17/23 1616   BP: (!) 121/91 123/80 112/75    Pulse: (!) 105 (!) 108 (!) 106 (!) 115   Resp: 16 18 22 25   Temp:       SpO2: 94% 93% 95% 92%   Weight:       Height:           MDM     Amount and/or Complexity of Data Reviewed  Clinical lab tests: ordered and reviewed  Tests in the radiology section of CPT®: ordered and reviewed  Discuss the patient with other providers: yes  Independent visualization of images, tracings, or specimens: yes      Vital signs stable patient tachycardic and febrile with history of several days of worsening right lower quadrant abdominal pain. She does have reassuring labs. Proceed with CT scan to further evaluate and likely has small bowel perforation.   Have discussed the case with Dr. Tanya Arora

## 2023-04-17 NOTE — ANESTHESIA PRE PROCEDURE
Department of Anesthesiology  Preprocedure Note       Name:  Roberto Peña   Age:  47 y.o.  :  1970                                          MRN:  [id number]         Date:  2023      Surgeon: Yisel Mc):  Emily Juarez DO    Procedure: Procedure(s):  ROBOTIC DIAGNOSTIC LAPAROSCOPY, POSSIBLE BOWEL RESECTION, POSSIBLE OPEN    Medications prior to admission:   Prior to Admission medications    Medication Sig Start Date End Date Taking? Authorizing Provider   topiramate (TOPAMAX) 100 MG tablet TAKE 1 TABLET BY MOUTH DAILY. START AFTER COMPLETING THE PREVIOUS PRESCRIPTION 4/3/23   Michaud January, APRN   HYDROcodone-acetaminophen (NORCO) 7.5-325 MG per tablet Take 1 tablet by mouth every 6 hours as needed for Pain for up to 30 days. (May fill 3/22/23) 3/22/23 4/21/23  JAEL Angela - CNP   DULoxetine (CYMBALTA) 60 MG extended release capsule TAKE 1 CAPSULE BY MOUTH DAILY 23, APRN   rizatriptan (MAXALT) 10 MG tablet Take 1 tablet by mouth once as needed for Migraine May repeat in 2 hours if needed 23, APRN   tiZANidine (ZANAFLEX) 4 MG tablet 1/4 tablet with meals 1/2 to whole tablet at bedtime 23   Taylor Mcgregor APRN - CNP   omeprazole (PRILOSEC) 20 MG delayed release capsule Take 1 capsule by mouth every morning (before breakfast) 22, APRN   Ibuprofen-Acetaminophen (ADVIL DUAL ACTION) 125-250 MG TABS Take 2 tablets by mouth every 8 hours as needed 21   Historical Provider, MD   vitamin D (D3-1000) 25 MCG (1000 UT) TABS tablet Take 1,000 Units by mouth daily    Historical Provider, MD   clobetasol propionate 0.05 % LOTN lotion Apply to affected area twice daily for psoriasis. Avoid use on face.  3/29/22   Michaud January, APRN   Multiple Vitamins-Minerals (THERAPEUTIC MULTIVITAMIN-MINERALS) tablet Take 1 tablet by mouth daily    Historical Provider, MD       Current medications:    No

## 2023-04-18 LAB
ANION GAP SERPL CALCULATED.3IONS-SCNC: 8 MMOL/L (ref 7–19)
BASOPHILS # BLD: 0 K/UL (ref 0–0.2)
BASOPHILS NFR BLD: 0.1 % (ref 0–1)
BUN SERPL-MCNC: 10 MG/DL (ref 6–20)
CALCIUM SERPL-MCNC: 8.5 MG/DL (ref 8.6–10)
CHLORIDE SERPL-SCNC: 106 MMOL/L (ref 98–111)
CO2 SERPL-SCNC: 26 MMOL/L (ref 22–29)
CREAT SERPL-MCNC: 0.7 MG/DL (ref 0.5–0.9)
EOSINOPHIL # BLD: 0 K/UL (ref 0–0.6)
EOSINOPHIL NFR BLD: 0 % (ref 0–5)
ERYTHROCYTE [DISTWIDTH] IN BLOOD BY AUTOMATED COUNT: 13.2 % (ref 11.5–14.5)
GLUCOSE SERPL-MCNC: 98 MG/DL (ref 74–109)
HCT VFR BLD AUTO: 36 % (ref 37–47)
HGB BLD-MCNC: 11.7 G/DL (ref 12–16)
IMM GRANULOCYTES # BLD: 0.1 K/UL
LYMPHOCYTES # BLD: 1.2 K/UL (ref 1.1–4.5)
LYMPHOCYTES NFR BLD: 9.7 % (ref 20–40)
MAGNESIUM SERPL-MCNC: 1.8 MG/DL (ref 1.6–2.6)
MCH RBC QN AUTO: 30.2 PG (ref 27–31)
MCHC RBC AUTO-ENTMCNC: 32.5 G/DL (ref 33–37)
MCV RBC AUTO: 93 FL (ref 81–99)
MONOCYTES # BLD: 0.7 K/UL (ref 0–0.9)
MONOCYTES NFR BLD: 5.8 % (ref 0–10)
NEUTROPHILS # BLD: 10.3 K/UL (ref 1.5–7.5)
NEUTS SEG NFR BLD: 83.8 % (ref 50–65)
PLATELET # BLD AUTO: 160 K/UL (ref 130–400)
PMV BLD AUTO: 9.9 FL (ref 9.4–12.3)
POTASSIUM SERPL-SCNC: 3.4 MMOL/L (ref 3.5–5)
RBC # BLD AUTO: 3.87 M/UL (ref 4.2–5.4)
SODIUM SERPL-SCNC: 140 MMOL/L (ref 136–145)
WBC # BLD AUTO: 12.3 K/UL (ref 4.8–10.8)

## 2023-04-18 PROCEDURE — 80048 BASIC METABOLIC PNL TOTAL CA: CPT

## 2023-04-18 PROCEDURE — 2580000003 HC RX 258: Performed by: SURGERY

## 2023-04-18 PROCEDURE — 83735 ASSAY OF MAGNESIUM: CPT

## 2023-04-18 PROCEDURE — 6360000002 HC RX W HCPCS: Performed by: SURGERY

## 2023-04-18 PROCEDURE — 36415 COLL VENOUS BLD VENIPUNCTURE: CPT

## 2023-04-18 PROCEDURE — 6370000000 HC RX 637 (ALT 250 FOR IP): Performed by: SURGERY

## 2023-04-18 PROCEDURE — 99024 POSTOP FOLLOW-UP VISIT: CPT | Performed by: SURGERY

## 2023-04-18 PROCEDURE — 1210000000 HC MED SURG R&B

## 2023-04-18 PROCEDURE — 85025 COMPLETE CBC W/AUTO DIFF WBC: CPT

## 2023-04-18 PROCEDURE — 94760 N-INVAS EAR/PLS OXIMETRY 1: CPT

## 2023-04-18 RX ORDER — TOPIRAMATE 100 MG/1
100 TABLET, FILM COATED ORAL DAILY
Status: DISCONTINUED | OUTPATIENT
Start: 2023-04-18 | End: 2023-04-20 | Stop reason: HOSPADM

## 2023-04-18 RX ORDER — DULOXETIN HYDROCHLORIDE 60 MG/1
60 CAPSULE, DELAYED RELEASE ORAL DAILY
Status: DISCONTINUED | OUTPATIENT
Start: 2023-04-18 | End: 2023-04-20 | Stop reason: HOSPADM

## 2023-04-18 RX ADMIN — DULOXETINE 60 MG: 60 CAPSULE, DELAYED RELEASE ORAL at 09:00

## 2023-04-18 RX ADMIN — SENNOSIDES AND DOCUSATE SODIUM 1 TABLET: 50; 8.6 TABLET ORAL at 22:28

## 2023-04-18 RX ADMIN — SENNOSIDES AND DOCUSATE SODIUM 1 TABLET: 50; 8.6 TABLET ORAL at 09:17

## 2023-04-18 RX ADMIN — FAMOTIDINE 20 MG: 20 TABLET ORAL at 22:28

## 2023-04-18 RX ADMIN — HYDROMORPHONE HYDROCHLORIDE 1 MG: 1 INJECTION, SOLUTION INTRAMUSCULAR; INTRAVENOUS; SUBCUTANEOUS at 17:07

## 2023-04-18 RX ADMIN — HYDROMORPHONE HYDROCHLORIDE 1 MG: 1 INJECTION, SOLUTION INTRAMUSCULAR; INTRAVENOUS; SUBCUTANEOUS at 22:14

## 2023-04-18 RX ADMIN — HYDROMORPHONE HYDROCHLORIDE 1 MG: 1 INJECTION, SOLUTION INTRAMUSCULAR; INTRAVENOUS; SUBCUTANEOUS at 02:07

## 2023-04-18 RX ADMIN — BISACODYL 5 MG: 5 TABLET, COATED ORAL at 09:16

## 2023-04-18 RX ADMIN — ACETAMINOPHEN 1000 MG: 500 TABLET ORAL at 22:28

## 2023-04-18 RX ADMIN — TOPIRAMATE 100 MG: 100 TABLET, FILM COATED ORAL at 09:00

## 2023-04-18 RX ADMIN — ACETAMINOPHEN 1000 MG: 500 TABLET ORAL at 06:16

## 2023-04-18 RX ADMIN — HYDROMORPHONE HYDROCHLORIDE 1 MG: 1 INJECTION, SOLUTION INTRAMUSCULAR; INTRAVENOUS; SUBCUTANEOUS at 13:07

## 2023-04-18 RX ADMIN — SODIUM CHLORIDE, PRESERVATIVE FREE 10 ML: 5 INJECTION INTRAVENOUS at 09:49

## 2023-04-18 RX ADMIN — HYDROMORPHONE HYDROCHLORIDE 1 MG: 1 INJECTION, SOLUTION INTRAMUSCULAR; INTRAVENOUS; SUBCUTANEOUS at 09:15

## 2023-04-18 RX ADMIN — PIPERACILLIN AND TAZOBACTAM 3375 MG: 3; .375 INJECTION, POWDER, LYOPHILIZED, FOR SOLUTION INTRAVENOUS at 17:07

## 2023-04-18 RX ADMIN — PIPERACILLIN AND TAZOBACTAM 3375 MG: 3; .375 INJECTION, POWDER, LYOPHILIZED, FOR SOLUTION INTRAVENOUS at 02:09

## 2023-04-18 RX ADMIN — POLYETHYLENE GLYCOL 3350 17 G: 17 POWDER, FOR SOLUTION ORAL at 22:15

## 2023-04-18 RX ADMIN — ENOXAPARIN SODIUM 40 MG: 100 INJECTION SUBCUTANEOUS at 09:17

## 2023-04-18 RX ADMIN — SODIUM CHLORIDE, PRESERVATIVE FREE 10 ML: 5 INJECTION INTRAVENOUS at 22:28

## 2023-04-18 RX ADMIN — ACETAMINOPHEN 1000 MG: 500 TABLET ORAL at 15:34

## 2023-04-18 RX ADMIN — PIPERACILLIN AND TAZOBACTAM 3375 MG: 3; .375 INJECTION, POWDER, LYOPHILIZED, FOR SOLUTION INTRAVENOUS at 09:00

## 2023-04-18 RX ADMIN — FAMOTIDINE 20 MG: 20 TABLET ORAL at 09:16

## 2023-04-18 ASSESSMENT — PAIN SCALES - GENERAL
PAINLEVEL_OUTOF10: 6
PAINLEVEL_OUTOF10: 5
PAINLEVEL_OUTOF10: 10

## 2023-04-19 LAB
ANION GAP SERPL CALCULATED.3IONS-SCNC: 15 MMOL/L (ref 7–19)
BASOPHILS # BLD: 0 K/UL (ref 0–0.2)
BASOPHILS NFR BLD: 0.2 % (ref 0–1)
BUN SERPL-MCNC: 12 MG/DL (ref 6–20)
CALCIUM SERPL-MCNC: 8.5 MG/DL (ref 8.6–10)
CHLORIDE SERPL-SCNC: 107 MMOL/L (ref 98–111)
CO2 SERPL-SCNC: 25 MMOL/L (ref 22–29)
CREAT SERPL-MCNC: 0.8 MG/DL (ref 0.5–0.9)
EOSINOPHIL # BLD: 0.1 K/UL (ref 0–0.6)
EOSINOPHIL NFR BLD: 0.9 % (ref 0–5)
ERYTHROCYTE [DISTWIDTH] IN BLOOD BY AUTOMATED COUNT: 13.2 % (ref 11.5–14.5)
GLUCOSE SERPL-MCNC: 115 MG/DL (ref 74–109)
HCT VFR BLD AUTO: 39.2 % (ref 37–47)
HGB BLD-MCNC: 12.3 G/DL (ref 12–16)
IMM GRANULOCYTES # BLD: 0.1 K/UL
LYMPHOCYTES # BLD: 1.7 K/UL (ref 1.1–4.5)
LYMPHOCYTES NFR BLD: 14.9 % (ref 20–40)
MCH RBC QN AUTO: 30 PG (ref 27–31)
MCHC RBC AUTO-ENTMCNC: 31.4 G/DL (ref 33–37)
MCV RBC AUTO: 95.6 FL (ref 81–99)
MONOCYTES # BLD: 0.7 K/UL (ref 0–0.9)
MONOCYTES NFR BLD: 6.1 % (ref 0–10)
NEUTROPHILS # BLD: 8.8 K/UL (ref 1.5–7.5)
NEUTS SEG NFR BLD: 77.3 % (ref 50–65)
PLATELET # BLD AUTO: 177 K/UL (ref 130–400)
PMV BLD AUTO: 10.7 FL (ref 9.4–12.3)
POTASSIUM SERPL-SCNC: 3.7 MMOL/L (ref 3.5–5)
RBC # BLD AUTO: 4.1 M/UL (ref 4.2–5.4)
SODIUM SERPL-SCNC: 147 MMOL/L (ref 136–145)
WBC # BLD AUTO: 11.3 K/UL (ref 4.8–10.8)

## 2023-04-19 PROCEDURE — 80048 BASIC METABOLIC PNL TOTAL CA: CPT

## 2023-04-19 PROCEDURE — 2580000003 HC RX 258: Performed by: SURGERY

## 2023-04-19 PROCEDURE — 6360000002 HC RX W HCPCS: Performed by: SURGERY

## 2023-04-19 PROCEDURE — 85025 COMPLETE CBC W/AUTO DIFF WBC: CPT

## 2023-04-19 PROCEDURE — 1210000000 HC MED SURG R&B

## 2023-04-19 PROCEDURE — 6370000000 HC RX 637 (ALT 250 FOR IP): Performed by: SURGERY

## 2023-04-19 PROCEDURE — 2500000003 HC RX 250 WO HCPCS: Performed by: SURGERY

## 2023-04-19 PROCEDURE — 94760 N-INVAS EAR/PLS OXIMETRY 1: CPT

## 2023-04-19 PROCEDURE — 36415 COLL VENOUS BLD VENIPUNCTURE: CPT

## 2023-04-19 PROCEDURE — 99024 POSTOP FOLLOW-UP VISIT: CPT | Performed by: SURGERY

## 2023-04-19 RX ORDER — BISACODYL 10 MG
10 SUPPOSITORY, RECTAL RECTAL DAILY PRN
Status: DISCONTINUED | OUTPATIENT
Start: 2023-04-19 | End: 2023-04-20 | Stop reason: HOSPADM

## 2023-04-19 RX ORDER — DEXTROSE, SODIUM CHLORIDE, AND POTASSIUM CHLORIDE 5; .45; .15 G/100ML; G/100ML; G/100ML
INJECTION INTRAVENOUS CONTINUOUS
Status: DISCONTINUED | OUTPATIENT
Start: 2023-04-19 | End: 2023-04-20

## 2023-04-19 RX ADMIN — SODIUM CHLORIDE, POTASSIUM CHLORIDE, SODIUM LACTATE AND CALCIUM CHLORIDE: 600; 310; 30; 20 INJECTION, SOLUTION INTRAVENOUS at 01:03

## 2023-04-19 RX ADMIN — PIPERACILLIN AND TAZOBACTAM 3375 MG: 3; .375 INJECTION, POWDER, LYOPHILIZED, FOR SOLUTION INTRAVENOUS at 17:49

## 2023-04-19 RX ADMIN — ENOXAPARIN SODIUM 40 MG: 100 INJECTION SUBCUTANEOUS at 11:56

## 2023-04-19 RX ADMIN — PIPERACILLIN AND TAZOBACTAM 3375 MG: 3; .375 INJECTION, POWDER, LYOPHILIZED, FOR SOLUTION INTRAVENOUS at 11:45

## 2023-04-19 RX ADMIN — ONDANSETRON 4 MG: 4 TABLET, ORALLY DISINTEGRATING ORAL at 05:17

## 2023-04-19 RX ADMIN — HYDROMORPHONE HYDROCHLORIDE 1 MG: 1 INJECTION, SOLUTION INTRAMUSCULAR; INTRAVENOUS; SUBCUTANEOUS at 05:24

## 2023-04-19 RX ADMIN — DEXTROSE MONOHYDRATE, SODIUM CHLORIDE, AND POTASSIUM CHLORIDE: 50; 4.5; 1.49 INJECTION, SOLUTION INTRAVENOUS at 12:14

## 2023-04-19 RX ADMIN — HYDROMORPHONE HYDROCHLORIDE 1 MG: 1 INJECTION, SOLUTION INTRAMUSCULAR; INTRAVENOUS; SUBCUTANEOUS at 21:01

## 2023-04-19 RX ADMIN — HYDROMORPHONE HYDROCHLORIDE 1 MG: 1 INJECTION, SOLUTION INTRAMUSCULAR; INTRAVENOUS; SUBCUTANEOUS at 08:44

## 2023-04-19 RX ADMIN — PIPERACILLIN AND TAZOBACTAM 3375 MG: 3; .375 INJECTION, POWDER, LYOPHILIZED, FOR SOLUTION INTRAVENOUS at 01:09

## 2023-04-19 RX ADMIN — SODIUM CHLORIDE, PRESERVATIVE FREE 20 MG: 5 INJECTION INTRAVENOUS at 21:01

## 2023-04-19 RX ADMIN — HYDROMORPHONE HYDROCHLORIDE 1 MG: 1 INJECTION, SOLUTION INTRAMUSCULAR; INTRAVENOUS; SUBCUTANEOUS at 17:50

## 2023-04-19 RX ADMIN — SODIUM CHLORIDE, PRESERVATIVE FREE 20 MG: 5 INJECTION INTRAVENOUS at 11:54

## 2023-04-19 RX ADMIN — HYDROMORPHONE HYDROCHLORIDE 1 MG: 1 INJECTION, SOLUTION INTRAMUSCULAR; INTRAVENOUS; SUBCUTANEOUS at 13:21

## 2023-04-19 RX ADMIN — BISACODYL 10 MG: 10 SUPPOSITORY RECTAL at 11:59

## 2023-04-19 ASSESSMENT — PAIN DESCRIPTION - ORIENTATION: ORIENTATION: MID;LOWER;ANTERIOR

## 2023-04-19 ASSESSMENT — PAIN - FUNCTIONAL ASSESSMENT: PAIN_FUNCTIONAL_ASSESSMENT: PREVENTS OR INTERFERES SOME ACTIVE ACTIVITIES AND ADLS

## 2023-04-19 ASSESSMENT — PAIN SCALES - GENERAL
PAINLEVEL_OUTOF10: 8
PAINLEVEL_OUTOF10: 3
PAINLEVEL_OUTOF10: 8
PAINLEVEL_OUTOF10: 8
PAINLEVEL_OUTOF10: 7
PAINLEVEL_OUTOF10: 8

## 2023-04-19 ASSESSMENT — PAIN DESCRIPTION - LOCATION
LOCATION: ABDOMEN
LOCATION: ABDOMEN

## 2023-04-20 VITALS
BODY MASS INDEX: 36.32 KG/M2 | HEIGHT: 65 IN | HEART RATE: 77 BPM | WEIGHT: 218 LBS | SYSTOLIC BLOOD PRESSURE: 126 MMHG | DIASTOLIC BLOOD PRESSURE: 78 MMHG | OXYGEN SATURATION: 95 % | RESPIRATION RATE: 18 BRPM | TEMPERATURE: 98.2 F

## 2023-04-20 LAB
ANION GAP SERPL CALCULATED.3IONS-SCNC: 10 MMOL/L (ref 7–19)
BASOPHILS # BLD: 0 K/UL (ref 0–0.2)
BASOPHILS NFR BLD: 0.4 % (ref 0–1)
BUN SERPL-MCNC: 6 MG/DL (ref 6–20)
CALCIUM SERPL-MCNC: 8.2 MG/DL (ref 8.6–10)
CHLORIDE SERPL-SCNC: 109 MMOL/L (ref 98–111)
CO2 SERPL-SCNC: 24 MMOL/L (ref 22–29)
CREAT SERPL-MCNC: 0.6 MG/DL (ref 0.5–0.9)
EOSINOPHIL # BLD: 0.3 K/UL (ref 0–0.6)
EOSINOPHIL NFR BLD: 3.7 % (ref 0–5)
ERYTHROCYTE [DISTWIDTH] IN BLOOD BY AUTOMATED COUNT: 12.9 % (ref 11.5–14.5)
GLUCOSE SERPL-MCNC: 124 MG/DL (ref 74–109)
HCT VFR BLD AUTO: 38.7 % (ref 37–47)
HGB BLD-MCNC: 11.8 G/DL (ref 12–16)
IMM GRANULOCYTES # BLD: 0.1 K/UL
LYMPHOCYTES # BLD: 1.6 K/UL (ref 1.1–4.5)
LYMPHOCYTES NFR BLD: 20.9 % (ref 20–40)
MAGNESIUM SERPL-MCNC: 2 MG/DL (ref 1.6–2.6)
MCH RBC QN AUTO: 29.9 PG (ref 27–31)
MCHC RBC AUTO-ENTMCNC: 30.5 G/DL (ref 33–37)
MCV RBC AUTO: 98.2 FL (ref 81–99)
MONOCYTES # BLD: 0.5 K/UL (ref 0–0.9)
MONOCYTES NFR BLD: 6.7 % (ref 0–10)
NEUTROPHILS # BLD: 5.2 K/UL (ref 1.5–7.5)
NEUTS SEG NFR BLD: 67.5 % (ref 50–65)
PLATELET # BLD AUTO: 171 K/UL (ref 130–400)
PMV BLD AUTO: 10.7 FL (ref 9.4–12.3)
POTASSIUM SERPL-SCNC: 3.4 MMOL/L (ref 3.5–5)
RBC # BLD AUTO: 3.94 M/UL (ref 4.2–5.4)
SODIUM SERPL-SCNC: 143 MMOL/L (ref 136–145)
WBC # BLD AUTO: 7.7 K/UL (ref 4.8–10.8)

## 2023-04-20 PROCEDURE — 2580000003 HC RX 258: Performed by: SURGERY

## 2023-04-20 PROCEDURE — 94760 N-INVAS EAR/PLS OXIMETRY 1: CPT

## 2023-04-20 PROCEDURE — 6370000000 HC RX 637 (ALT 250 FOR IP): Performed by: SURGERY

## 2023-04-20 PROCEDURE — 85025 COMPLETE CBC W/AUTO DIFF WBC: CPT

## 2023-04-20 PROCEDURE — 2500000003 HC RX 250 WO HCPCS: Performed by: SURGERY

## 2023-04-20 PROCEDURE — 83735 ASSAY OF MAGNESIUM: CPT

## 2023-04-20 PROCEDURE — 36415 COLL VENOUS BLD VENIPUNCTURE: CPT

## 2023-04-20 PROCEDURE — 6360000002 HC RX W HCPCS: Performed by: SURGERY

## 2023-04-20 PROCEDURE — 80048 BASIC METABOLIC PNL TOTAL CA: CPT

## 2023-04-20 RX ORDER — OXYCODONE HYDROCHLORIDE AND ACETAMINOPHEN 5; 325 MG/1; MG/1
1 TABLET ORAL EVERY 4 HOURS PRN
Status: DISCONTINUED | OUTPATIENT
Start: 2023-04-20 | End: 2023-04-20 | Stop reason: HOSPADM

## 2023-04-20 RX ORDER — SENNA AND DOCUSATE SODIUM 50; 8.6 MG/1; MG/1
1 TABLET, FILM COATED ORAL DAILY PRN
Qty: 14 TABLET | Refills: 0 | Status: SHIPPED | OUTPATIENT
Start: 2023-04-20 | End: 2023-05-04

## 2023-04-20 RX ORDER — OXYCODONE HYDROCHLORIDE AND ACETAMINOPHEN 5; 325 MG/1; MG/1
1 TABLET ORAL EVERY 4 HOURS PRN
Qty: 18 TABLET | Refills: 0 | Status: SHIPPED | OUTPATIENT
Start: 2023-04-20 | End: 2023-04-23

## 2023-04-20 RX ORDER — AMOXICILLIN AND CLAVULANATE POTASSIUM 875; 125 MG/1; MG/1
1 TABLET, FILM COATED ORAL 2 TIMES DAILY
Qty: 14 TABLET | Refills: 0 | Status: SHIPPED | OUTPATIENT
Start: 2023-04-20 | End: 2023-04-27

## 2023-04-20 RX ADMIN — TOPIRAMATE 100 MG: 100 TABLET, FILM COATED ORAL at 09:00

## 2023-04-20 RX ADMIN — HYDROMORPHONE HYDROCHLORIDE 1 MG: 1 INJECTION, SOLUTION INTRAMUSCULAR; INTRAVENOUS; SUBCUTANEOUS at 00:56

## 2023-04-20 RX ADMIN — ENOXAPARIN SODIUM 40 MG: 100 INJECTION SUBCUTANEOUS at 08:59

## 2023-04-20 RX ADMIN — HYDROMORPHONE HYDROCHLORIDE 1 MG: 1 INJECTION, SOLUTION INTRAMUSCULAR; INTRAVENOUS; SUBCUTANEOUS at 04:27

## 2023-04-20 RX ADMIN — PIPERACILLIN AND TAZOBACTAM 3375 MG: 3; .375 INJECTION, POWDER, LYOPHILIZED, FOR SOLUTION INTRAVENOUS at 09:08

## 2023-04-20 RX ADMIN — PIPERACILLIN AND TAZOBACTAM 3375 MG: 3; .375 INJECTION, POWDER, LYOPHILIZED, FOR SOLUTION INTRAVENOUS at 01:00

## 2023-04-20 RX ADMIN — OXYCODONE HYDROCHLORIDE AND ACETAMINOPHEN 1 TABLET: 5; 325 TABLET ORAL at 14:57

## 2023-04-20 RX ADMIN — POLYETHYLENE GLYCOL 3350 17 G: 17 POWDER, FOR SOLUTION ORAL at 08:59

## 2023-04-20 RX ADMIN — DULOXETINE 60 MG: 60 CAPSULE, DELAYED RELEASE ORAL at 09:00

## 2023-04-20 RX ADMIN — ACETAMINOPHEN 1000 MG: 500 TABLET ORAL at 14:56

## 2023-04-20 RX ADMIN — SODIUM CHLORIDE, PRESERVATIVE FREE 20 MG: 5 INJECTION INTRAVENOUS at 08:59

## 2023-04-20 ASSESSMENT — PAIN DESCRIPTION - LOCATION
LOCATION: ABDOMEN
LOCATION: ABDOMEN;HEAD

## 2023-04-20 ASSESSMENT — PAIN SCALES - GENERAL
PAINLEVEL_OUTOF10: 7
PAINLEVEL_OUTOF10: 9
PAINLEVEL_OUTOF10: 7

## 2023-04-20 ASSESSMENT — PAIN DESCRIPTION - ORIENTATION: ORIENTATION: LOWER

## 2023-04-20 ASSESSMENT — PAIN - FUNCTIONAL ASSESSMENT: PAIN_FUNCTIONAL_ASSESSMENT: PREVENTS OR INTERFERES SOME ACTIVE ACTIVITIES AND ADLS

## 2023-04-20 ASSESSMENT — PAIN DESCRIPTION - DESCRIPTORS: DESCRIPTORS: DULL;ACHING

## 2023-04-20 NOTE — DISCHARGE INSTRUCTIONS
No heavy lifting for 4 weeks. May resume normal diet. No driving while on pain medication. May shower 24 hours postoperatively. Do not soak in tub. No swimming. Allow glue on incision to fall off on its own.     Please call the office if you have:   - Fever or chills   - Difficulty with bowel movements   - Increased pain   - Nausea and/or vomiting   - Redness or drainage from the incision sites

## 2023-04-20 NOTE — PROGRESS NOTES
[address] [facility] Surgery Progress Note    Chief Complaint:  Chief Complaint   Patient presents with    Abdominal Pain       POD # 2    S: Mrs. Yecenia Levine is seen and examined at bedside. She was nauseated overnight without emesis. NGT was not hooked back up to suction. Passing flatus, no BM.      O:   /85   Pulse 68   Temp (!) 96.4 °F (35.8 °C) (Temporal)   Resp 14   Ht 5' 5\" (1.651 m)   Wt 218 lb (98.9 kg)   SpO2 (!) 87%   BMI 36.28 kg/m²   I/O reviewed and appropriate  CONSTITUTIONAL: Alert, appropriate, no acute distress  SKIN: warm, dry with no rashes or lesions  HEENT: NCAT, Non icteric, PERR. Trachea Midline. CHEST/LUNGS: CTA bilaterally. Normal respiratory effort. CARDIOVASCULAR: RRR, No edema. ABDOMEN: soft, ND, appropriately TTP, +BS  Incisions: Clean, dry, and intact with no erythema or induration. Fish serosanguinous. NEUROLOGIC: CN II-XI grossly intact, no motor or sensory deficits   MUSCULOSKELETAL: No clubbing or cyanosis. PSYCHIATRIC:  Normal Mood and Affect. Alert and Oriented. LABS:   CBC:   Recent Labs     04/17/23  0819 04/18/23  0842 04/19/23  0156   WBC 10.4 12.3* 11.3*   RBC 4.54 3.87* 4.10*   HGB 13.5 11.7* 12.3   HCT 41.8 36.0* 39.2    160 177   LYMPHOPCT 9.7* 9.7* 14.9*   MONOPCT 6.5 5.8 6.1   BASOPCT 0.2 0.1 0.2   MONOSABS 0.70 0.70 0.70   LYMPHSABS 1.0* 1.2 1.7   EOSABS 0.10 0.00 0.10   BASOSABS 0.00 0.00 0.00      BMP:   Recent Labs     04/17/23  0819 04/18/23  0842 04/19/23  0156    140 147*   K 3.3* 3.4* 3.7    106 107   CO2 24 26 25   ANIONGAP 11 8 15   GLUCOSE 108 98 115*   CREATININE 0.8 0.7 0.8   LABGLOM >60 >60 >60   CALCIUM 9.4 8.5* 8.5*     LFT:   Recent Labs     04/17/23  0819   PROT 7.1   LABALBU 4.1   BILITOT 0.5   ALKPHOS 108*   ALT 12   AST 16       A:     Small bowel perforation s/p robotic small bowel resection. Class 2 obesity in adult  Hypokalemia--resolved      P:   Analgesia prn. Scheduled tylenol.   Hemodynamically
Pt arrived to room from PACU with NGT in R nare @ 68 cm. NGT clamped upon arriving to floor, while awaiting xray to verify placement. Chest xray was obtained approx 1745. Per Dr Shantell Ortega, based on chest xray, may advance NGT 4-5cm. When RN went to try and advance NGT, pt continued to complain of \"feeling it in her throat. \" RN had pt open mouth and NGT was found coiled up in the back of throat. NGT was removed. RN allowed pt some time to recover from getting NGT pulled and then placed a new NGT. 18 Fr NGT placed in R nare @ 61 cm. RN had pt open mouth to visualize back of throat, no coiling noted. Pt immediately reported relief and feeling \"much better. \" Placement was initially verified by 2 RN's utilizing bubble study ascultation, and chest xray was ordered. NGT remained clamped until chest xray results were back. Dr Shantell Ortega was notified.
[facility] General Surgery Progress Note    Chief Complaint:  Chief Complaint   Patient presents with    Abdominal Pain       POD # 1    S: Mrs. Twila Forbes is seen and examined at bedside. She has some right sided pain. She notes no flatus. Denies nausea. Significant discomfort from NGT which was removed and replaced yesterday due to coiling. O:   /79   Pulse 87   Temp 97.7 °F (36.5 °C) (Temporal)   Resp 16   Ht 5' 5\" (1.651 m)   Wt 218 lb (98.9 kg)   SpO2 95%   BMI 36.28 kg/m²   I/O reviewed and appropriate  CONSTITUTIONAL: Alert, appropriate, no acute distress  SKIN: warm, dry with no rashes or lesions  HEENT: NCAT, Non icteric, PERR. Trachea Midline. CHEST/LUNGS: CTA bilaterally. Normal respiratory effort. CARDIOVASCULAR: RRR, No edema. ABDOMEN: soft, ND, appropriately TTP, +BS  Incisions: Clean, dry, and intact with no erythema or induration. Fish serosanguinous. NEUROLOGIC: CN II-XI grossly intact, no motor or sensory deficits   MUSCULOSKELETAL: No clubbing or cyanosis. PSYCHIATRIC:  Normal Mood and Affect. Alert and Oriented. LABS:   CBC:   Recent Labs     04/17/23  0819 04/18/23  0842   WBC 10.4 12.3*   RBC 4.54 3.87*   HGB 13.5 11.7*   HCT 41.8 36.0*    160   LYMPHOPCT 9.7* 9.7*   MONOPCT 6.5 5.8   BASOPCT 0.2 0.1   MONOSABS 0.70 0.70   LYMPHSABS 1.0* 1.2   EOSABS 0.10 0.00   BASOSABS 0.00 0.00      BMP:   Recent Labs     04/17/23  0819      K 3.3*      CO2 24   ANIONGAP 11   GLUCOSE 108   CREATININE 0.8   LABGLOM >60   CALCIUM 9.4     LFT:   Recent Labs     04/17/23  0819   PROT 7.1   LABALBU 4.1   BILITOT 0.5   ALKPHOS 108*   ALT 12   AST 16       A:     Small bowel perforation s/p robotic small bowel resection. Class 2 obesity in adult  Hypokalemia      P:   Analgesia prn. Scheduled tylenol. Hemodynamically stable. Clamp NGT. May have sips and chips and popsicles. If no nausea, vomiting plan to remove tube tomorrow. Resume necessary home medications.
inflammation. 3.  Surgical excision margins are viable. 4.  Negative for evidence of malignancy      A:     Small bowel perforation s/p robotic small bowel resection. Class 2 obesity in adult  Hypokalemia--resolved      P:   Advance to soft diet  P.o. pain control  Encouraged IS usage. Encouraged ambulation and out of bed to chair. Abdominal binder as needed.   Leukocytosis resolved  Continue antibiotics for intra-abdominal abscess, will switch to p.o. at the time of discharge  Pathology shows no signs of malignancy, ectopic gastric mucosa this was discussed with the patient  Possibly home later this evening if she tolerates her diet      Sanjana Abraham, DO   Electronically Signed on 4/20/2023 at 10:17 AM

## 2023-04-20 NOTE — DISCHARGE INSTR - DIET
Good nutrition is important when healing from an illness, injury, or surgery. Follow any nutrition recommendations given to you during your hospital stay. If you were given an oral nutrition supplement while in the hospital, continue to take this supplement at home. You can take it with meals, in-between meals, and/or before bedtime. These supplements can be purchased at most local grocery stores, pharmacies, and chain [employer]-stores.    If you have any questions about your diet or nutrition, call the hospital and ask for     soft and bite sized advance as tolerated

## 2023-04-22 LAB
BACTERIA BLD CULT ORG #2: NORMAL
BACTERIA BLD CULT: NORMAL

## 2023-04-24 NOTE — DISCHARGE SUMMARY
Physician Discharge Summary     Patient ID:  Ferny Jean  [id number]  89 y.o.  1970    Admit date: 4/17/2023    Discharge date and time: 4/20/2023  5:44 PM     Admitting Physician: Abby Carlos DO     Discharge Physician: Zahida Cortes DO    Admission Diagnoses: Small bowel perforation ([facility] [address] [K63.1]  Perforated viscus [R19.8] Obesity    Discharge Diagnoses: Same    Admission Condition: fair    Discharged Condition: good    Indication for Admission: surgery    Hospital Course: Mrs. Hellen Scott presented to the emergency room with abdominal pain. She was found to have perforated small bowel on her CT scan. She was taken to the operating room and the area of small bowel perforation was removed robotically. She did well postoperatively and was discharged on post-op day three. Consults: none    Significant Diagnostic Studies: see chart    Treatments: IV hydration, antibiotics: Zosyn, and surgery: robotic small bowel resection    Discharge Exam:  No exam performed today,  Discharged by a different physician . Disposition: home    In process/preliminary results:  Outstanding Order Results       No orders found from 3/19/2023 to 4/18/2023. Patient Instructions:   Discharge Medication List as of 4/20/2023  4:31 PM        START taking these medications    Details   oxyCODONE-acetaminophen (PERCOCET) 5-325 MG per tablet Take 1 tablet by mouth every 4 hours as needed for Pain for up to 3 days. Max Daily Amount: 6 tablets, Disp-18 tablet, R-0Normal      amoxicillin-clavulanate (AUGMENTIN) 875-125 MG per tablet Take 1 tablet by mouth 2 times daily for 7 days, Disp-14 tablet, R-0Normal      sennosides-docusate sodium (SENOKOT-S) 8.6-50 MG tablet Take 1 tablet by mouth daily as needed for Constipation, Disp-14 tablet, R-0Normal           CONTINUE these medications which have NOT CHANGED    Details   topiramate (TOPAMAX) 100 MG tablet TAKE 1 TABLET BY MOUTH DAILY.  START AFTER COMPLETING THE PREVIOUS

## 2023-05-03 ENCOUNTER — OFFICE VISIT (OUTPATIENT)
Dept: SURGERY | Age: 53
End: 2023-05-03

## 2023-05-03 VITALS
OXYGEN SATURATION: 97 % | BODY MASS INDEX: 35.3 KG/M2 | TEMPERATURE: 97.5 F | WEIGHT: 206.8 LBS | HEART RATE: 90 BPM | HEIGHT: 64 IN

## 2023-05-03 DIAGNOSIS — Z09 POSTOPERATIVE EXAMINATION: Primary | ICD-10-CM

## 2023-05-03 PROCEDURE — 99024 POSTOP FOLLOW-UP VISIT: CPT | Performed by: SURGERY

## 2023-05-03 NOTE — PROGRESS NOTES
Postop Progress Note    Subjective    Avelina Diane presents to the office for postop follow up 2 weeks s/p robotic small bowel resection. She has some constipation, but overall she is feeling well. She is taking a stool softener. Objective    Vitals:    05/03/23 1008   Pulse: 90   Temp: 97.5 °F (36.4 °C)   SpO2: 97%     General: alert, cooperative and no distress  Incision: healing well    Pathology   Small intestine, segmental excision:   1.  Ectopic gastric tissue with focal perforation and adjacent abscess  formation. 2.  Benign small intestine with patchy mild mixed acute and chronic inflammation. 3.  Surgical excision margins are viable. 4.  Negative for evidence of malignancy. Assessment  Doing well postoperatively s/p excision of perforated small bowel and meckel's diverticulum. Plan  1. Continue any current medications  2. Wound care discussed  3. Pt is to increase activities as tolerated  4. Usual diet. Reviewed bowel regimen and provided documentation.   5. Follow up: as needed    Electronically signed by Latoya Cantrell DO on 6/0/5529 at 10:14 AM

## 2023-05-15 ENCOUNTER — HOSPITAL ENCOUNTER (OUTPATIENT)
Dept: NON INVASIVE DIAGNOSTICS | Age: 53
Discharge: HOME | End: 2023-05-15
Payer: MEDICAID

## 2023-05-15 ENCOUNTER — OFFICE VISIT (OUTPATIENT)
Dept: PRIMARY CARE CLINIC | Age: 53
End: 2023-05-15
Payer: MEDICAID

## 2023-05-15 VITALS
OXYGEN SATURATION: 95 % | WEIGHT: 205.6 LBS | TEMPERATURE: 97.6 F | SYSTOLIC BLOOD PRESSURE: 122 MMHG | DIASTOLIC BLOOD PRESSURE: 80 MMHG | HEART RATE: 106 BPM | BODY MASS INDEX: 35.29 KG/M2

## 2023-05-15 DIAGNOSIS — E78.49 FAMILIAL HYPERLIPIDEMIA: ICD-10-CM

## 2023-05-15 DIAGNOSIS — K63.1 SMALL BOWEL PERFORATION (HCC): ICD-10-CM

## 2023-05-15 DIAGNOSIS — E55.9 VITAMIN D DEFICIENCY: ICD-10-CM

## 2023-05-15 DIAGNOSIS — M79.661 PAIN IN RIGHT LOWER LEG: ICD-10-CM

## 2023-05-15 DIAGNOSIS — M51.36 DEGENERATIVE DISC DISEASE, LUMBAR: ICD-10-CM

## 2023-05-15 DIAGNOSIS — Z98.890 POSTOPERATIVE STATE: ICD-10-CM

## 2023-05-15 DIAGNOSIS — M54.2 CHRONIC NECK PAIN: ICD-10-CM

## 2023-05-15 DIAGNOSIS — G44.229 CHRONIC TENSION-TYPE HEADACHE, NOT INTRACTABLE: ICD-10-CM

## 2023-05-15 DIAGNOSIS — G89.29 CHRONIC NECK PAIN: ICD-10-CM

## 2023-05-15 LAB
25(OH)D3 SERPL-MCNC: 41.6 NG/ML
ALBUMIN SERPL-MCNC: 4.4 G/DL (ref 3.5–5.2)
ALP SERPL-CCNC: 109 U/L (ref 35–104)
ALT SERPL-CCNC: 19 U/L (ref 5–33)
ANION GAP SERPL CALCULATED.3IONS-SCNC: 9 MMOL/L (ref 7–19)
AST SERPL-CCNC: 19 U/L (ref 5–32)
BASOPHILS # BLD: 0 K/UL (ref 0–0.2)
BASOPHILS NFR BLD: 0.3 % (ref 0–1)
BILIRUB SERPL-MCNC: 0.3 MG/DL (ref 0.2–1.2)
BUN SERPL-MCNC: 8 MG/DL (ref 6–20)
CALCIUM SERPL-MCNC: 9.6 MG/DL (ref 8.6–10)
CHLORIDE SERPL-SCNC: 105 MMOL/L (ref 98–111)
CO2 SERPL-SCNC: 28 MMOL/L (ref 22–29)
CREAT SERPL-MCNC: 0.7 MG/DL (ref 0.5–0.9)
EOSINOPHIL # BLD: 0.3 K/UL (ref 0–0.6)
EOSINOPHIL NFR BLD: 4.1 % (ref 0–5)
ERYTHROCYTE [DISTWIDTH] IN BLOOD BY AUTOMATED COUNT: 13.1 % (ref 11.5–14.5)
GLUCOSE SERPL-MCNC: 108 MG/DL (ref 74–109)
HCT VFR BLD AUTO: 43.1 % (ref 37–47)
HGB BLD-MCNC: 13.8 G/DL (ref 12–16)
IMM GRANULOCYTES # BLD: 0 K/UL
LDLC SERPL-MCNC: 167 MG/DL
LYMPHOCYTES # BLD: 2.2 K/UL (ref 1.1–4.5)
LYMPHOCYTES NFR BLD: 31.6 % (ref 20–40)
MCH RBC QN AUTO: 29.8 PG (ref 27–31)
MCHC RBC AUTO-ENTMCNC: 32 G/DL (ref 33–37)
MCV RBC AUTO: 93.1 FL (ref 81–99)
MONOCYTES # BLD: 0.6 K/UL (ref 0–0.9)
MONOCYTES NFR BLD: 8 % (ref 0–10)
NEUTROPHILS # BLD: 3.9 K/UL (ref 1.5–7.5)
NEUTS SEG NFR BLD: 55.6 % (ref 50–65)
PLATELET # BLD AUTO: 227 K/UL (ref 130–400)
PMV BLD AUTO: 10 FL (ref 9.4–12.3)
POTASSIUM SERPL-SCNC: 3.9 MMOL/L (ref 3.5–5)
PROT SERPL-MCNC: 7.3 G/DL (ref 6.6–8.7)
RBC # BLD AUTO: 4.63 M/UL (ref 4.2–5.4)
SODIUM SERPL-SCNC: 142 MMOL/L (ref 136–145)
WBC # BLD AUTO: 7 K/UL (ref 4.8–10.8)

## 2023-05-15 PROCEDURE — 99214 OFFICE O/P EST MOD 30 MIN: CPT | Performed by: NURSE PRACTITIONER

## 2023-05-15 PROCEDURE — 93971 EXTREMITY STUDY: CPT

## 2023-05-15 RX ORDER — METHOCARBAMOL 500 MG/1
500 TABLET, FILM COATED ORAL 2 TIMES DAILY PRN
Qty: 60 TABLET | Refills: 2 | Status: SHIPPED | OUTPATIENT
Start: 2023-05-15 | End: 2023-06-14

## 2023-05-15 RX ORDER — TOPIRAMATE 100 MG/1
TABLET, FILM COATED ORAL
Qty: 30 TABLET | Refills: 3
Start: 2023-05-15

## 2023-05-15 RX ORDER — TIZANIDINE 4 MG/1
TABLET ORAL
Qty: 60 TABLET | Refills: 2 | Status: SHIPPED | OUTPATIENT
Start: 2023-05-15 | End: 2023-05-15

## 2023-05-15 ASSESSMENT — ENCOUNTER SYMPTOMS
CHEST TIGHTNESS: 0
DIARRHEA: 0
ABDOMINAL PAIN: 0
BACK PAIN: 1
SORE THROAT: 0
NAUSEA: 0
WHEEZING: 0
COUGH: 0
SHORTNESS OF BREATH: 0

## 2023-05-15 NOTE — PROGRESS NOTES
Discontinue: tiZANidine (ZANAFLEX) 4 MG tablet; 1/4 tablet with meals 1/2 to whole tablet at bedtime  -     methocarbamol (ROBAXIN) 500 MG tablet; Take 1 tablet by mouth 2 times daily as needed (neck and back pain)  -     topiramate (TOPAMAX) 100 MG tablet; TAKE 1 TABLET BY MOUTH DAILY. Medications Discontinued During This Encounter   Medication Reason    tiZANidine (ZANAFLEX) 4 MG tablet REORDER    tiZANidine (ZANAFLEX) 4 MG tablet LIST CLEANUP    topiramate (TOPAMAX) 100 MG tablet      There are no Patient Instructions on file for this visit. Patient voicesunderstanding and agrees to plans along with risks and benefits of plan. Counseling:  Shermanfranky LUCIO Torres's case, medications and options were discussed in detail. Patient was instructed to call the office if she questionsregarding her treatment. Should her conditions worsen, she should return to office to be reassessed by JAEL Ambrose. she Should to go the closest Emergency Department for any emergency. They verbalizedunderstanding the above instructions. Return in about 3 months (around 8/15/2023) for follow up, 30 minute visit.

## 2023-05-16 DIAGNOSIS — E78.49 FAMILIAL HYPERLIPIDEMIA: Primary | ICD-10-CM

## 2023-05-16 RX ORDER — ROSUVASTATIN CALCIUM 5 MG/1
5 TABLET, COATED ORAL NIGHTLY
Qty: 30 TABLET | Refills: 5 | Status: SHIPPED | OUTPATIENT
Start: 2023-05-16

## 2023-08-23 RX ORDER — TIZANIDINE 4 MG/1
4 TABLET ORAL 2 TIMES DAILY PRN
Qty: 60 TABLET | Refills: 2 | Status: SHIPPED | OUTPATIENT
Start: 2023-08-23

## 2023-08-23 RX ORDER — OMEPRAZOLE 20 MG/1
CAPSULE, DELAYED RELEASE ORAL
Qty: 90 CAPSULE | Refills: 2 | Status: SHIPPED | OUTPATIENT
Start: 2023-08-23

## 2023-08-23 NOTE — TELEPHONE ENCOUNTER
[address] called to request a refill on her medication.       Last office visit : 5/15/2023   Next office visit : 8/29/2023     Requested Prescriptions     Pending Prescriptions Disp Refills    omeprazole (PRILOSEC) 20 MG delayed release capsule [Pharmacy Med Name: OMEPRAZOLE 20MG CAPSULES] 90 capsule 2     Sig: TAKE 1 CAPSULE BY MOUTH EVERY MORNING BEFORE BREAKFAST            Darci Kay

## 2023-08-29 DIAGNOSIS — G44.229 CHRONIC TENSION-TYPE HEADACHE, NOT INTRACTABLE: Primary | ICD-10-CM

## 2023-08-29 RX ORDER — TOPIRAMATE 100 MG/1
TABLET, FILM COATED ORAL
Qty: 90 TABLET | Refills: 2 | Status: SHIPPED | OUTPATIENT
Start: 2023-08-29 | End: 2023-09-11

## 2023-08-29 NOTE — TELEPHONE ENCOUNTER
[address] called to request a refill on her medication. Last office visit : 5/15/2023   Next office visit : 8/29/2023     Requested Prescriptions     Pending Prescriptions Disp Refills    topiramate (TOPAMAX) 100 MG tablet 90 tablet 3     Sig: TAKE 1 TABLET BY MOUTH DAILY.             Sherry Crimes

## 2023-09-11 ENCOUNTER — OFFICE VISIT (OUTPATIENT)
Dept: PRIMARY CARE CLINIC | Age: 53
End: 2023-09-11
Payer: MEDICAID

## 2023-09-11 VITALS
TEMPERATURE: 97.3 F | OXYGEN SATURATION: 97 % | SYSTOLIC BLOOD PRESSURE: 134 MMHG | HEIGHT: 65 IN | BODY MASS INDEX: 33.42 KG/M2 | DIASTOLIC BLOOD PRESSURE: 84 MMHG | WEIGHT: 200.6 LBS | HEART RATE: 94 BPM

## 2023-09-11 DIAGNOSIS — R20.2 NUMBNESS AND TINGLING OF RIGHT UPPER EXTREMITY: ICD-10-CM

## 2023-09-11 DIAGNOSIS — M48.02 CERVICAL SPINAL STENOSIS: ICD-10-CM

## 2023-09-11 DIAGNOSIS — E78.49 FAMILIAL HYPERLIPIDEMIA: ICD-10-CM

## 2023-09-11 DIAGNOSIS — Z12.31 SCREENING MAMMOGRAM FOR BREAST CANCER: ICD-10-CM

## 2023-09-11 DIAGNOSIS — R29.898 RUE WEAKNESS: ICD-10-CM

## 2023-09-11 DIAGNOSIS — M54.2 CHRONIC NECK PAIN: ICD-10-CM

## 2023-09-11 DIAGNOSIS — G89.29 CHRONIC NECK PAIN: ICD-10-CM

## 2023-09-11 DIAGNOSIS — R20.0 NUMBNESS AND TINGLING OF RIGHT UPPER EXTREMITY: ICD-10-CM

## 2023-09-11 DIAGNOSIS — G44.229 CHRONIC TENSION-TYPE HEADACHE, NOT INTRACTABLE: ICD-10-CM

## 2023-09-11 PROCEDURE — 99214 OFFICE O/P EST MOD 30 MIN: CPT | Performed by: NURSE PRACTITIONER

## 2023-09-11 RX ORDER — TOPIRAMATE 25 MG/1
TABLET ORAL
Qty: 42 TABLET | Refills: 0 | Status: SHIPPED | OUTPATIENT
Start: 2023-09-11 | End: 2023-10-02

## 2023-09-11 RX ORDER — DULOXETIN HYDROCHLORIDE 30 MG/1
CAPSULE, DELAYED RELEASE ORAL
Qty: 24 CAPSULE | Refills: 0 | Status: SHIPPED | OUTPATIENT
Start: 2023-09-11

## 2023-09-11 SDOH — ECONOMIC STABILITY: FOOD INSECURITY: WITHIN THE PAST 12 MONTHS, YOU WORRIED THAT YOUR FOOD WOULD RUN OUT BEFORE YOU GOT MONEY TO BUY MORE.: NEVER TRUE

## 2023-09-11 SDOH — ECONOMIC STABILITY: HOUSING INSECURITY
IN THE LAST 12 MONTHS, WAS THERE A TIME WHEN YOU DID NOT HAVE A STEADY PLACE TO SLEEP OR SLEPT IN A SHELTER (INCLUDING NOW)?: NO

## 2023-09-11 SDOH — ECONOMIC STABILITY: FOOD INSECURITY: WITHIN THE PAST 12 MONTHS, THE FOOD YOU BOUGHT JUST DIDN'T LAST AND YOU DIDN'T HAVE MONEY TO GET MORE.: NEVER TRUE

## 2023-09-11 SDOH — ECONOMIC STABILITY: INCOME INSECURITY: HOW HARD IS IT FOR YOU TO PAY FOR THE VERY BASICS LIKE FOOD, HOUSING, MEDICAL CARE, AND HEATING?: NOT HARD AT ALL

## 2023-09-11 ASSESSMENT — ENCOUNTER SYMPTOMS
BACK PAIN: 1
DIARRHEA: 0
NAUSEA: 0
WHEEZING: 0
COUGH: 0
CHEST TIGHTNESS: 0
ABDOMINAL PAIN: 0
SORE THROAT: 0
SHORTNESS OF BREATH: 0

## 2023-09-11 NOTE — PROGRESS NOTES
Ange Moreau is a 47 y.o. female who presents today for  Chief Complaint   Patient presents with    3 Month Follow-Up     Pt wanting to discuss stopping topomax. Doesn't feel that it is effective. Also wants to see about weaning off of anti-depressant       HPI:  She has a history of chronic headaches. She is currently taking topiramate but would like to wean off. She feels her headaches are related to her chronic neck pain. She is trying to minimize her medication. She would also like to wean off duloxetine. She states when she started this she was having anxiety and depression but is no longer having these issues. She has had worsening neck pain. Pain starts in the back of neck and radiates intermittently into RUE. She has had weakness, numbness/tingling in RUE. She is dropping things at times. MRI in 2021 showed multilevel degenerative changes with osteophytes and disc bulging. Moderate spinal stenosis noted at C5-6. She completed PT in the past with no improvement. She was evaluated by neurosurgery and referred to pain management. She had injections but felt it made her pain worse. Pain management was prescribing hydrocodone and tizanidine but was discharged in March after not completing a UDS at appointment per her report. She does not really want pain medication but wants to get some relief from her neck pain. She also has chronic lower back pain, lumbar DDD but her neck is the main issue currently. She started rosuvastatin in May for worsening hyperlipidemia. She is tolerating well. She has not completed her repeat lab yet.   Lab Results   Component Value Date     05/15/2023    K 3.9 05/15/2023     05/15/2023    CO2 28 05/15/2023    BUN 8 05/15/2023    CREATININE 0.7 05/15/2023    GLUCOSE 108 05/15/2023    CALCIUM 9.6 05/15/2023    PROT 7.3 05/15/2023    LABALBU 4.4 05/15/2023    BILITOT 0.3 05/15/2023    ALKPHOS 109 (H) 05/15/2023    AST 19 05/15/2023    ALT 19

## 2023-10-04 RX ORDER — TIZANIDINE 4 MG/1
TABLET ORAL
Qty: 60 TABLET | Refills: 2 | OUTPATIENT
Start: 2023-10-04

## 2023-10-24 ENCOUNTER — TELEPHONE (OUTPATIENT)
Dept: PRIMARY CARE CLINIC | Age: 53
End: 2023-10-24

## 2023-10-24 NOTE — TELEPHONE ENCOUNTER
The referral was sent to pain management on 10- at [phone]. They will contact the patient with apt details.

## 2023-11-06 ENCOUNTER — HOSPITAL ENCOUNTER (OUTPATIENT)
Dept: MRI IMAGING | Age: 53
Discharge: HOME | End: 2023-11-06
Payer: MEDICAID

## 2023-11-06 DIAGNOSIS — R20.2 NUMBNESS AND TINGLING OF RIGHT UPPER EXTREMITY: ICD-10-CM

## 2023-11-06 DIAGNOSIS — R29.898 RUE WEAKNESS: ICD-10-CM

## 2023-11-06 DIAGNOSIS — M54.2 CHRONIC NECK PAIN: ICD-10-CM

## 2023-11-06 DIAGNOSIS — R20.0 NUMBNESS AND TINGLING OF RIGHT UPPER EXTREMITY: ICD-10-CM

## 2023-11-06 DIAGNOSIS — G89.29 CHRONIC NECK PAIN: ICD-10-CM

## 2023-11-06 DIAGNOSIS — M48.02 CERVICAL SPINAL STENOSIS: ICD-10-CM

## 2023-11-06 PROCEDURE — 72141 MRI NECK SPINE W/O DYE: CPT

## 2023-11-07 DIAGNOSIS — M54.2 CHRONIC NECK PAIN: Primary | ICD-10-CM

## 2023-11-07 DIAGNOSIS — G89.29 CHRONIC NECK PAIN: Primary | ICD-10-CM

## 2023-11-07 DIAGNOSIS — M48.02 CERVICAL SPINAL STENOSIS: ICD-10-CM

## 2023-11-07 DIAGNOSIS — R20.0 NUMBNESS AND TINGLING OF RIGHT UPPER EXTREMITY: ICD-10-CM

## 2023-11-07 DIAGNOSIS — R20.2 NUMBNESS AND TINGLING OF RIGHT UPPER EXTREMITY: ICD-10-CM

## 2023-11-07 DIAGNOSIS — R29.898 RUE WEAKNESS: ICD-10-CM

## 2023-11-09 ENCOUNTER — TELEPHONE (OUTPATIENT)
Dept: NEUROSURGERY | Age: 53
End: 2023-11-09

## 2023-11-09 NOTE — TELEPHONE ENCOUNTER
Received np referral for patient. She is already established with DR Harlan Osler.  [facility] MRI C 10/6/23

## 2023-11-17 DIAGNOSIS — E78.49 FAMILIAL HYPERLIPIDEMIA: ICD-10-CM

## 2023-11-17 NOTE — TELEPHONE ENCOUNTER
"DAILY PROGRESS NOTE  [facility]    Patient Identification:  Name: Nathan Baez  Age: 76 y.o.  Sex: male  :  1945  MRN: [id number]         Primary Care Physician: Damien Pierce MD      Subjective  Patient presently with no acute complaints.    Objective:  General Appearance:  Comfortable, well-appearing, in no acute distress and not in pain (Morbidly obese.).    Vital signs: (most recent): Blood pressure 121/68, pulse 100, temperature 98.4 °F (36.9 °C), temperature source Skin, resp. rate 18, height 170.2 cm (67\"), weight 114 kg (250 lb 14.1 oz), SpO2 92 %.    Lungs:  Normal effort and normal respiratory rate.  Breath sounds clear to auscultation.    Heart: Normal rate.  Regular rhythm.    Extremities: There is dependent edema.  (Trace pretibial edema bilaterally.)  Neurological: Patient is alert and oriented to person, place and time.    Skin:  Warm and dry.                Vital signs in last 24 hours:  Temp:  [98 °F (36.7 °C)-99.1 °F (37.3 °C)] 98.4 °F (36.9 °C)  Heart Rate:  [] 100  Resp:  [16-18] 18  BP: ()/(56-74) 121/68    Intake/Output:    Intake/Output Summary (Last 24 hours) at 2021 1034  Last data filed at 2021 0935  Gross per 24 hour   Intake 690 ml   Output --   Net 690 ml         Results from last 7 days   Lab Units 21  0759   WBC 10*3/mm3 8.15   HEMOGLOBIN g/dL 11.7*   PLATELETS 10*3/mm3 149     Results from last 7 days   Lab Units 21  0759   SODIUM mmol/L 137   POTASSIUM mmol/L 4.0   CHLORIDE mmol/L 100   CO2 mmol/L 26.2   BUN mg/dL 16   CREATININE mg/dL 1.04   GLUCOSE mg/dL 226*   Estimated Creatinine Clearance: 74 mL/min (by C-G formula based on SCr of 1.04 mg/dL).  Results from last 7 days   Lab Units 21  0759   CALCIUM mg/dL 8.5*         Assessment:    Primary osteoarthritis of right hip    Diabetes type 2: In reviewing his records he is actually been a diabetic for at least 8 months now.  I discussed this at length with the " [address] called to request a refill on her medication.       Last office visit : 9/11/2023   Next office visit : 11/20/2023     Requested Prescriptions     Pending Prescriptions Disp Refills    rosuvastatin (CRESTOR) 5 MG tablet 90 tablet 3     Sig: Take 1 tablet by mouth nightly            Dilcia Levy patient and his wife at bedside.  We do not have a diabetic educator available on the weekend.  The RN will be going over some educational material with them and home health will be following up also.  He was started on Metformin last night which is reasonable start.  With his morbid obesity however he may do well with a GLP-1 agonist.  I will defer this to his primary care physician.  Morbid obesity: Please see above.  Discussed importance of weight loss.    All problems new to this examiner.    Plan:  We appreciate been involved in this pleasant gentleman's care.  Okay for discharge and further follow-up and work-up as an outpatient.    Daren Coyle MD  8/21/2021  10:34 EDT

## 2023-11-19 RX ORDER — ROSUVASTATIN CALCIUM 5 MG/1
5 TABLET, COATED ORAL NIGHTLY
Qty: 90 TABLET | Refills: 1 | Status: SHIPPED | OUTPATIENT
Start: 2023-11-19

## 2023-11-20 ENCOUNTER — OFFICE VISIT (OUTPATIENT)
Dept: PRIMARY CARE CLINIC | Age: 53
End: 2023-11-20
Payer: MEDICAID

## 2023-11-20 VITALS
BODY MASS INDEX: 34.49 KG/M2 | DIASTOLIC BLOOD PRESSURE: 70 MMHG | OXYGEN SATURATION: 97 % | HEIGHT: 65 IN | WEIGHT: 207 LBS | TEMPERATURE: 98 F | HEART RATE: 98 BPM | SYSTOLIC BLOOD PRESSURE: 112 MMHG

## 2023-11-20 DIAGNOSIS — M54.2 CHRONIC NECK PAIN: ICD-10-CM

## 2023-11-20 DIAGNOSIS — E78.49 FAMILIAL HYPERLIPIDEMIA: ICD-10-CM

## 2023-11-20 DIAGNOSIS — R20.0 NUMBNESS AND TINGLING OF RIGHT UPPER EXTREMITY: ICD-10-CM

## 2023-11-20 DIAGNOSIS — R20.2 NUMBNESS AND TINGLING OF RIGHT UPPER EXTREMITY: ICD-10-CM

## 2023-11-20 DIAGNOSIS — M48.02 CERVICAL SPINAL STENOSIS: ICD-10-CM

## 2023-11-20 DIAGNOSIS — G89.29 CHRONIC NECK PAIN: ICD-10-CM

## 2023-11-20 LAB
ALBUMIN SERPL-MCNC: 4.3 G/DL (ref 3.5–5.2)
ALP SERPL-CCNC: 113 U/L (ref 35–104)
ALT SERPL-CCNC: 11 U/L (ref 5–33)
ANION GAP SERPL CALCULATED.3IONS-SCNC: 8 MMOL/L (ref 7–19)
AST SERPL-CCNC: 17 U/L (ref 5–32)
BILIRUB SERPL-MCNC: 0.4 MG/DL (ref 0.2–1.2)
BUN SERPL-MCNC: 12 MG/DL (ref 6–20)
CALCIUM SERPL-MCNC: 9.9 MG/DL (ref 8.6–10)
CHLORIDE SERPL-SCNC: 101 MMOL/L (ref 98–111)
CHOLEST SERPL-MCNC: 205 MG/DL (ref 160–199)
CO2 SERPL-SCNC: 32 MMOL/L (ref 22–29)
CREAT SERPL-MCNC: 0.6 MG/DL (ref 0.5–0.9)
GLUCOSE SERPL-MCNC: 96 MG/DL (ref 74–109)
HDLC SERPL-MCNC: 56 MG/DL (ref 65–121)
LDLC SERPL CALC-MCNC: 133 MG/DL
POTASSIUM SERPL-SCNC: 4.1 MMOL/L (ref 3.5–5)
PROT SERPL-MCNC: 7.3 G/DL (ref 6.6–8.7)
SODIUM SERPL-SCNC: 141 MMOL/L (ref 136–145)
TRIGL SERPL-MCNC: 82 MG/DL (ref 0–149)

## 2023-11-20 PROCEDURE — 99214 OFFICE O/P EST MOD 30 MIN: CPT | Performed by: NURSE PRACTITIONER

## 2023-11-20 RX ORDER — NORTRIPTYLINE HYDROCHLORIDE 10 MG/1
10 CAPSULE ORAL NIGHTLY
Qty: 30 CAPSULE | Refills: 3 | Status: SHIPPED | OUTPATIENT
Start: 2023-11-20

## 2023-11-20 ASSESSMENT — ENCOUNTER SYMPTOMS
NAUSEA: 0
ABDOMINAL PAIN: 0
SORE THROAT: 0
DIARRHEA: 0
SHORTNESS OF BREATH: 0
CHEST TIGHTNESS: 0
COUGH: 0
WHEEZING: 0

## 2023-11-20 NOTE — PROGRESS NOTES
Farrukh Diaz is a 47 y.o. female who presents today for  Chief Complaint   Patient presents with    Follow-up       HPI:  Here for follow-up on neck pain. She has weaned off duloxetine and topiramate. Headaches have not changed since off topiramate. She feels they are triggered by her chronic neck pain. Pain has not changed since off duloxetine. She was contacted by  pain management but did not schedule due to not wanting to proceed with injections or pain medication. She missed the phone call from neurosurgery but plans to call back to schedule. Has been followed by Dr. Shelton Kline in the past.  Her MRI cervical spine 11/6/2023 showed severe degenerative changes, multilevel advanced degenerative changes worse at C5-6 and C7-T1. She is currently taking ibuprofen but is supposed to avoid due to history of small bowel perforation. It does not help much. Tylenol is ineffective. Tizanidine helps dull the pain. Mainly takes at night, occasionally during the day. She has not completed fasting lab. She started rosuvastatin in May for hyperlipidemia. Tolerating well. Has been out of the medication for about a week but plans to  today. Review of Systems   HENT:  Negative for congestion, ear pain and sore throat. Respiratory:  Negative for cough, chest tightness, shortness of breath and wheezing. Cardiovascular:  Negative for chest pain. Gastrointestinal:  Negative for abdominal pain, diarrhea and nausea. Musculoskeletal:  Positive for neck pain. Negative for arthralgias and myalgias. Skin:  Negative for rash.        Past Medical History:   Diagnosis Date    Abnormal Pap smear of cervix     GERD (gastroesophageal reflux disease)     Hip pain     Hyperlipidemia     Hypertension     Patient thinks she has HTN but untreated    Nephrolithiasis     Primary osteoarthritis of right hip 12/3/2019    Psoriasis        Current Outpatient Medications   Medication Sig Dispense

## 2023-11-21 ENCOUNTER — TELEPHONE (OUTPATIENT)
Dept: PRIMARY CARE CLINIC | Age: 53
End: 2023-11-21

## 2023-11-21 NOTE — TELEPHONE ENCOUNTER
----- Message from JAEL Hernandez sent at 11/21/2023  8:06 AM CST -----  Cholesterol is improving. Continue rosuvastatin. Work on low fat diet, limit carbs and sugars. The rest of her lab was normal/stable.

## 2024-02-14 ENCOUNTER — OFFICE VISIT (OUTPATIENT)
Dept: PRIMARY CARE CLINIC | Age: 54
End: 2024-02-14
Payer: MEDICAID

## 2024-02-14 ENCOUNTER — TELEPHONE (OUTPATIENT)
Dept: PRIMARY CARE CLINIC | Age: 54
End: 2024-02-14

## 2024-02-14 VITALS
TEMPERATURE: 97.6 F | HEART RATE: 94 BPM | DIASTOLIC BLOOD PRESSURE: 88 MMHG | WEIGHT: 215 LBS | OXYGEN SATURATION: 98 % | SYSTOLIC BLOOD PRESSURE: 128 MMHG | BODY MASS INDEX: 35.82 KG/M2 | HEIGHT: 65 IN

## 2024-02-14 DIAGNOSIS — F51.01 PRIMARY INSOMNIA: ICD-10-CM

## 2024-02-14 DIAGNOSIS — Z51.81 THERAPEUTIC DRUG MONITORING: ICD-10-CM

## 2024-02-14 DIAGNOSIS — M54.2 CHRONIC NECK PAIN: ICD-10-CM

## 2024-02-14 DIAGNOSIS — K21.9 GASTROESOPHAGEAL REFLUX DISEASE WITHOUT ESOPHAGITIS: ICD-10-CM

## 2024-02-14 DIAGNOSIS — G89.29 CHRONIC NECK PAIN: ICD-10-CM

## 2024-02-14 DIAGNOSIS — M48.02 CERVICAL SPINAL STENOSIS: ICD-10-CM

## 2024-02-14 DIAGNOSIS — E78.49 FAMILIAL HYPERLIPIDEMIA: ICD-10-CM

## 2024-02-14 LAB
ALCOHOL URINE: NORMAL
AMPHETAMINE SCREEN, URINE: NORMAL
BARBITURATE SCREEN, URINE: NORMAL
BENZODIAZEPINE SCREEN, URINE: NORMAL
BUPRENORPHINE URINE: NORMAL
COCAINE METABOLITE SCREEN URINE: NORMAL
FENTANYL SCREEN, URINE: NORMAL
GABAPENTIN SCREEN, URINE: NORMAL
MDMA URINE: NORMAL
METHADONE SCREEN, URINE: NORMAL
METHAMPHETAMINE, URINE: NORMAL
OPIATE SCREEN URINE: NORMAL
OXYCODONE SCREEN URINE: NORMAL
PHENCYCLIDINE SCREEN URINE: NORMAL
PROPOXYPHENE SCREEN, URINE: NORMAL
SYNTHETIC CANNABINOIDS(K2) SCREEN, URINE: NORMAL
THC SCREEN, URINE: NORMAL
TRAMADOL SCREEN URINE: NORMAL
TRICYCLIC ANTIDEPRESSANTS, UR: POSITIVE

## 2024-02-14 PROCEDURE — 99214 OFFICE O/P EST MOD 30 MIN: CPT | Performed by: NURSE PRACTITIONER

## 2024-02-14 RX ORDER — TEMAZEPAM 15 MG/1
15 CAPSULE ORAL NIGHTLY PRN
Qty: 30 CAPSULE | Refills: 2 | Status: SHIPPED | OUTPATIENT
Start: 2024-02-14 | End: 2024-05-14

## 2024-02-14 RX ORDER — OMEPRAZOLE 40 MG/1
40 CAPSULE, DELAYED RELEASE ORAL
Qty: 90 CAPSULE | Refills: 3 | Status: SHIPPED | OUTPATIENT
Start: 2024-02-14

## 2024-02-14 NOTE — TELEPHONE ENCOUNTER
Can we call Dr. Fuchs's office to see if she can be scheduled for an appt?  She is needing to see him for re-evaluation of chronic worsening neck pain. She says she has tried to call his office a few times and has not gotten a call back.

## 2024-02-14 NOTE — PROGRESS NOTES
mg hs prn.  SE profile reviewed.  UDS, controlled Rx contract, Angel updated today.    2. Chronic neck pain  -She has been unable to get in contact with neurosurgery for appointment.  Will call office to see if she can be scheduled.    3. Cervical spinal stenosis      4. Gastroesophageal reflux disease without esophagitis  -Uncontrolled, increase omeprazole to 40 mg daily  - omeprazole (PRILOSEC) 40 MG delayed release capsule; Take 1 capsule by mouth every morning (before breakfast)  Dispense: 90 capsule; Refill: 3    5. Familial hyperlipidemia  -Stable, continue rosuvastatin 5 mg daily  -Fasting lipid in 3 months  - Comprehensive Metabolic Panel; Future  - Lipid Panel; Future    6. Therapeutic drug monitoring    - POCT Rapid Drug Screen         Return in about 3 months (around 5/14/2024) for office visit extended, hyperlipidemia, neck pain.    Avelina was seen today for follow-up.    Diagnoses and all orders for this visit:    Primary insomnia    Chronic neck pain    Cervical spinal stenosis    Gastroesophageal reflux disease without esophagitis  -     omeprazole (PRILOSEC) 40 MG delayed release capsule; Take 1 capsule by mouth every morning (before breakfast)    Familial hyperlipidemia  -     Comprehensive Metabolic Panel; Future  -     Lipid Panel; Future    Therapeutic drug monitoring  -     POCT Rapid Drug Screen      Medications Discontinued During This Encounter   Medication Reason    omeprazole (PRILOSEC) 20 MG delayed release capsule REORDER     There are no Patient Instructions on file for this visit.    Patient voicesunderstanding and agrees to plans along with risks and benefits of plan.    Counseling:  Avelina Torres's case, medications and options were discussed in detail. Patient was instructed to call the office if she has any questions regarding her treatment. Should her conditions worsen, she should return to office to be reassessed by JAEL Griffin. she Should to go the closest

## 2024-02-15 ENCOUNTER — TELEPHONE (OUTPATIENT)
Dept: NEUROSURGERY | Age: 54
End: 2024-02-15

## 2024-02-15 NOTE — TELEPHONE ENCOUNTER
Called patient back and rescheduled appointment to next available she could make it. She voiced understanding.

## 2024-02-15 NOTE — TELEPHONE ENCOUNTER
Called and had patient an appointment for 2/20 but she is not able to go that day. I gave her the number and told her to make sure to choose the option for appointments and she will schedule at her convenience.

## 2024-03-04 ENCOUNTER — OFFICE VISIT (OUTPATIENT)
Dept: NEUROSURGERY | Age: 54
End: 2024-03-04
Payer: MEDICAID

## 2024-03-04 VITALS
DIASTOLIC BLOOD PRESSURE: 80 MMHG | HEART RATE: 77 BPM | WEIGHT: 214.95 LBS | SYSTOLIC BLOOD PRESSURE: 112 MMHG | BODY MASS INDEX: 35.81 KG/M2 | HEIGHT: 65 IN

## 2024-03-04 DIAGNOSIS — M54.12 CERVICAL RADICULOPATHY: Primary | ICD-10-CM

## 2024-03-04 DIAGNOSIS — M50.30 DEGENERATIVE DISC DISEASE, CERVICAL: ICD-10-CM

## 2024-03-04 PROCEDURE — 99214 OFFICE O/P EST MOD 30 MIN: CPT | Performed by: NEUROLOGICAL SURGERY

## 2024-03-04 ASSESSMENT — ENCOUNTER SYMPTOMS
EYES NEGATIVE: 1
RESPIRATORY NEGATIVE: 1
BACK PAIN: 1
GASTROINTESTINAL NEGATIVE: 1

## 2024-03-04 NOTE — PROGRESS NOTES
NEUROSURGERY FOLLOW UP      Chief Complaint:   Chief Complaint   Patient presents with    Follow-up     Patient is here for worsening neck pain.     Results     [facility] MRI 11/6/23         HPI:     Patient returns to re-establish care for worsening neck pain.  I have previously seen her for issues with both her neck and lower back.  She describes worsening pain at the base of her cervical spine and pain in her left trapezius.  She also describes occasional radicular pain in her right arm.  She feels her hands are weak and she is dropping things.  She denies any changes in her balance.  She has been in pain management previously and injections were not effective.  She has done PT in the past for her lower back but states she has not had PT for her cervical spine issues.        ROS:    Constitutional: Negative.    HENT: Negative.     Eyes: Negative.    Respiratory: Negative.     Cardiovascular: Negative.    Gastrointestinal: Negative.    Genitourinary: Negative.    Musculoskeletal:  Positive for back pain, joint pain, myalgias and neck pain.   Skin: Negative.    Neurological:  Positive for tingling, weakness and headaches.   Endo/Heme/Allergies: Negative.    Psychiatric/Behavioral: Negative         Review of systems was obtained by the medical assistant and reviewed by myself.    Objective:    /80   Pulse 77   Ht 1.651 m (5' 5\")   Wt 97.5 kg (214 lb 15.2 oz)   BMI 35.77 kg/m²         Physical Exam:    General: alert, cooperative, no distress  Cardiorespiratory: unlabored breathing      Neurologic Exam:    Mental Status: Alert, oriented, thought content appropriate  Cranial Nerves: PERRL, EOMI, symmetric facies, tongue midline  Motor: Motor exam is symmetrical 5 out of 5 all extremities bilaterally  Somatosensory: normal light touch sensation  Reflexes: 1+ at biceps and patella bilaterally, Rivas's absent bilaterally      Imaging:    MRI cervical spine reviewed and compared to prior.  There has been slight

## 2024-03-04 NOTE — PROGRESS NOTES
Review of Systems   Constitutional: Negative.    HENT: Negative.     Eyes: Negative.    Respiratory: Negative.     Cardiovascular: Negative.    Gastrointestinal: Negative.    Genitourinary: Negative.    Musculoskeletal:  Positive for back pain, joint pain, myalgias and neck pain.   Skin: Negative.    Neurological:  Positive for tingling, weakness and headaches.   Endo/Heme/Allergies: Negative.    Psychiatric/Behavioral: Negative.

## 2024-03-18 DIAGNOSIS — M54.2 CHRONIC NECK PAIN: ICD-10-CM

## 2024-03-18 DIAGNOSIS — G89.29 CHRONIC NECK PAIN: ICD-10-CM

## 2024-03-18 NOTE — TELEPHONE ENCOUNTER
Avelina Torres called to request a refill on her medication.      Last office visit : 2/14/2024   Next office visit : 5/14/2024     Requested Prescriptions     Pending Prescriptions Disp Refills    nortriptyline (PAMELOR) 10 MG capsule [Pharmacy Med Name: NORTRIPTYLINE 10MG CAPSULES] 30 capsule 3     Sig: TAKE 1 CAPSULE BY MOUTH EVERY NIGHT            Niurka Davidson

## 2024-03-19 RX ORDER — NORTRIPTYLINE HYDROCHLORIDE 10 MG/1
10 CAPSULE ORAL NIGHTLY
Qty: 30 CAPSULE | Refills: 5 | Status: SHIPPED | OUTPATIENT
Start: 2024-03-19

## 2024-05-03 ENCOUNTER — HOSPITAL ENCOUNTER (OUTPATIENT)
Dept: VASCULAR LAB | Age: 54
End: 2024-05-03
Payer: MEDICARE

## 2024-05-03 ENCOUNTER — HOSPITAL ENCOUNTER (OUTPATIENT)
Dept: GENERAL RADIOLOGY | Age: 54
Discharge: HOME | End: 2024-05-03
Payer: MEDICARE

## 2024-05-03 ENCOUNTER — OFFICE VISIT (OUTPATIENT)
Dept: PRIMARY CARE CLINIC | Age: 54
End: 2024-05-03
Payer: MEDICARE

## 2024-05-03 VITALS
HEIGHT: 65 IN | DIASTOLIC BLOOD PRESSURE: 80 MMHG | SYSTOLIC BLOOD PRESSURE: 130 MMHG | TEMPERATURE: 97.6 F | OXYGEN SATURATION: 97 % | BODY MASS INDEX: 37.32 KG/M2 | WEIGHT: 224 LBS | HEART RATE: 99 BPM

## 2024-05-03 DIAGNOSIS — M25.551 CHRONIC RIGHT HIP PAIN: ICD-10-CM

## 2024-05-03 DIAGNOSIS — E78.49 FAMILIAL HYPERLIPIDEMIA: ICD-10-CM

## 2024-05-03 DIAGNOSIS — G89.29 CHRONIC PAIN OF RIGHT KNEE: ICD-10-CM

## 2024-05-03 DIAGNOSIS — M79.661 PAIN AND SWELLING OF RIGHT LOWER LEG: ICD-10-CM

## 2024-05-03 DIAGNOSIS — M79.89 PAIN AND SWELLING OF RIGHT LOWER LEG: ICD-10-CM

## 2024-05-03 DIAGNOSIS — F51.01 PRIMARY INSOMNIA: ICD-10-CM

## 2024-05-03 DIAGNOSIS — G89.29 CHRONIC RIGHT HIP PAIN: ICD-10-CM

## 2024-05-03 DIAGNOSIS — M25.561 CHRONIC PAIN OF RIGHT KNEE: ICD-10-CM

## 2024-05-03 PROCEDURE — 3017F COLORECTAL CA SCREEN DOC REV: CPT | Performed by: NURSE PRACTITIONER

## 2024-05-03 PROCEDURE — G8427 DOCREV CUR MEDS BY ELIG CLIN: HCPCS | Performed by: NURSE PRACTITIONER

## 2024-05-03 PROCEDURE — 99214 OFFICE O/P EST MOD 30 MIN: CPT | Performed by: NURSE PRACTITIONER

## 2024-05-03 PROCEDURE — 73502 X-RAY EXAM HIP UNI 2-3 VIEWS: CPT

## 2024-05-03 PROCEDURE — 93971 EXTREMITY STUDY: CPT

## 2024-05-03 PROCEDURE — 1036F TOBACCO NON-USER: CPT | Performed by: NURSE PRACTITIONER

## 2024-05-03 PROCEDURE — 73560 X-RAY EXAM OF KNEE 1 OR 2: CPT

## 2024-05-03 PROCEDURE — G8417 CALC BMI ABV UP PARAM F/U: HCPCS | Performed by: NURSE PRACTITIONER

## 2024-05-03 RX ORDER — TEMAZEPAM 30 MG/1
30 CAPSULE ORAL NIGHTLY PRN
Qty: 30 CAPSULE | Refills: 2 | Status: SHIPPED | OUTPATIENT
Start: 2024-05-03 | End: 2024-08-01

## 2024-05-03 ASSESSMENT — ENCOUNTER SYMPTOMS
CHEST TIGHTNESS: 0
ABDOMINAL PAIN: 0
COUGH: 0
SORE THROAT: 0
WHEEZING: 0
SHORTNESS OF BREATH: 0
DIARRHEA: 0
NAUSEA: 0

## 2024-05-03 NOTE — PROGRESS NOTES
Mother     High Cholesterol Mother     Hypertension Father     Other Father         GERD, nephrolithiasis    High Cholesterol Father     Diabetes type 2  Father     Coronary Art Dis Father     High Blood Pressure Sister     Other Sister         GERD       /80   Pulse 99   Temp 97.6 °F (36.4 °C)   Ht 1.651 m (5' 5\")   Wt 101.6 kg (224 lb)   SpO2 97%   BMI 37.28 kg/m²     Physical Exam  Vitals reviewed.   Constitutional:       General: She is not in acute distress.     Appearance: Normal appearance. She is well-developed.   HENT:      Head: Normocephalic.   Eyes:      Conjunctiva/sclera: Conjunctivae normal.      Pupils: Pupils are equal, round, and reactive to light.   Neck:      Thyroid: No thyromegaly.      Vascular: No carotid bruit or JVD.      Trachea: No tracheal deviation.   Cardiovascular:      Rate and Rhythm: Normal rate and regular rhythm.      Heart sounds: Normal heart sounds. No murmur heard.  Pulmonary:      Effort: Pulmonary effort is normal. No respiratory distress.      Breath sounds: Normal breath sounds. No wheezing or rhonchi.   Musculoskeletal:         General: Tenderness (RLE with mild swelling, no erythema or warmth.  Mild calf tenderness.  Right posterior knee with tenderness and mild swelling.) present. Normal range of motion.      Cervical back: Normal range of motion and neck supple.   Lymphadenopathy:      Cervical: No cervical adenopathy.   Skin:     General: Skin is warm and dry.      Findings: No rash.   Neurological:      Mental Status: She is alert.   Psychiatric:         Mood and Affect: Mood normal.         Behavior: Behavior normal.         Thought Content: Thought content normal.         ASSESSMENT/PLAN:  1. Pain and swelling of right lower leg  -Obtain venous scan, r/o DVT  - VL DUP LOWER EXTREMITY VENOUS RIGHT; Future    2. Chronic right hip pain  -Obtain hip XR  -Continue ibuprofen prn  - XR HIP 2-3 VW W PELVIS RIGHT; Future    3. Chronic pain of right

## 2024-05-05 DIAGNOSIS — E78.49 FAMILIAL HYPERLIPIDEMIA: ICD-10-CM

## 2024-05-06 RX ORDER — ROSUVASTATIN CALCIUM 5 MG/1
5 TABLET, COATED ORAL NIGHTLY
Qty: 90 TABLET | Refills: 2 | Status: SHIPPED | OUTPATIENT
Start: 2024-05-06

## 2024-05-06 NOTE — TELEPHONE ENCOUNTER
Avelina Torres called to request a refill on her medication.      Last office visit : 5/3/2024   Next office visit : 8/12/2024     Requested Prescriptions     Pending Prescriptions Disp Refills    rosuvastatin (CRESTOR) 5 MG tablet [Pharmacy Med Name: ROSUVASTATIN 5MG TABLETS] 90 tablet 1     Sig: TAKE 1 TABLET BY MOUTH EVERY NIGHT            Niurka Davidson

## 2024-05-08 RX ORDER — OMEPRAZOLE 20 MG/1
CAPSULE, DELAYED RELEASE ORAL
Qty: 90 CAPSULE | Refills: 2 | OUTPATIENT
Start: 2024-05-08

## 2024-05-08 NOTE — TELEPHONE ENCOUNTER
The pharmacy sent the wrong script for the Omeprazole. They sent in 20 mg with refills. The doctor sent in Omeprazole 40 mg 90 supply with 3 refills on 02-.

## 2024-05-10 DIAGNOSIS — M79.89 PAIN AND SWELLING OF RIGHT LOWER LEG: ICD-10-CM

## 2024-05-10 DIAGNOSIS — M25.461 EFFUSION OF RIGHT KNEE: Primary | ICD-10-CM

## 2024-05-10 DIAGNOSIS — M79.661 PAIN AND SWELLING OF RIGHT LOWER LEG: ICD-10-CM

## 2024-06-24 DIAGNOSIS — G89.29 CHRONIC NECK PAIN: ICD-10-CM

## 2024-06-24 DIAGNOSIS — M54.2 CHRONIC NECK PAIN: ICD-10-CM

## 2024-06-24 RX ORDER — TIZANIDINE 4 MG/1
4 TABLET ORAL 2 TIMES DAILY PRN
Qty: 60 TABLET | Refills: 3 | Status: SHIPPED | OUTPATIENT
Start: 2024-06-24

## 2024-06-24 NOTE — TELEPHONE ENCOUNTER
Avelina Torres called to request a refill on her medication.      Last office visit : 5/3/2024   Next office visit : 8/12/2024     Requested Prescriptions     Pending Prescriptions Disp Refills    tiZANidine (ZANAFLEX) 4 MG tablet [Pharmacy Med Name: TIZANIDINE 4MG TABLETS] 60 tablet 3     Sig: TAKE 1 TABLET BY MOUTH TWICE DAILY AS NEEDED FOR PAIN            Niurka Davidson

## 2024-08-09 SDOH — HEALTH STABILITY: PHYSICAL HEALTH: ON AVERAGE, HOW MANY DAYS PER WEEK DO YOU ENGAGE IN MODERATE TO STRENUOUS EXERCISE (LIKE A BRISK WALK)?: 1 DAY

## 2024-08-09 SDOH — HEALTH STABILITY: PHYSICAL HEALTH: ON AVERAGE, HOW MANY MINUTES DO YOU ENGAGE IN EXERCISE AT THIS LEVEL?: 10 MIN

## 2024-08-09 ASSESSMENT — PATIENT HEALTH QUESTIONNAIRE - PHQ9
SUM OF ALL RESPONSES TO PHQ QUESTIONS 1-9: 0
1. LITTLE INTEREST OR PLEASURE IN DOING THINGS: NOT AT ALL
SUM OF ALL RESPONSES TO PHQ QUESTIONS 1-9: 0
SUM OF ALL RESPONSES TO PHQ QUESTIONS 1-9: 0
SUM OF ALL RESPONSES TO PHQ9 QUESTIONS 1 & 2: 0
2. FEELING DOWN, DEPRESSED OR HOPELESS: NOT AT ALL
SUM OF ALL RESPONSES TO PHQ QUESTIONS 1-9: 0

## 2024-08-09 ASSESSMENT — LIFESTYLE VARIABLES
HOW OFTEN DO YOU HAVE A DRINK CONTAINING ALCOHOL: NEVER
HOW MANY STANDARD DRINKS CONTAINING ALCOHOL DO YOU HAVE ON A TYPICAL DAY: PATIENT DOES NOT DRINK
HOW OFTEN DO YOU HAVE A DRINK CONTAINING ALCOHOL: 1
HOW MANY STANDARD DRINKS CONTAINING ALCOHOL DO YOU HAVE ON A TYPICAL DAY: 0
HOW OFTEN DO YOU HAVE SIX OR MORE DRINKS ON ONE OCCASION: 1

## 2024-08-12 ENCOUNTER — OFFICE VISIT (OUTPATIENT)
Dept: PRIMARY CARE CLINIC | Age: 54
End: 2024-08-12

## 2024-08-12 VITALS
BODY MASS INDEX: 38.99 KG/M2 | TEMPERATURE: 98 F | SYSTOLIC BLOOD PRESSURE: 120 MMHG | HEIGHT: 65 IN | OXYGEN SATURATION: 95 % | DIASTOLIC BLOOD PRESSURE: 80 MMHG | WEIGHT: 234 LBS | HEART RATE: 67 BPM

## 2024-08-12 DIAGNOSIS — G89.29 CHRONIC BILATERAL LOW BACK PAIN WITHOUT SCIATICA: ICD-10-CM

## 2024-08-12 DIAGNOSIS — M54.2 CHRONIC NECK PAIN: ICD-10-CM

## 2024-08-12 DIAGNOSIS — G89.29 CHRONIC PAIN OF RIGHT KNEE: ICD-10-CM

## 2024-08-12 DIAGNOSIS — Z12.11 COLON CANCER SCREENING: ICD-10-CM

## 2024-08-12 DIAGNOSIS — F51.01 PRIMARY INSOMNIA: ICD-10-CM

## 2024-08-12 DIAGNOSIS — E66.01 SEVERE OBESITY (BMI 35.0-39.9) WITH COMORBIDITY (HCC): ICD-10-CM

## 2024-08-12 DIAGNOSIS — M25.551 CHRONIC RIGHT HIP PAIN: ICD-10-CM

## 2024-08-12 DIAGNOSIS — E53.8 B12 DEFICIENCY: ICD-10-CM

## 2024-08-12 DIAGNOSIS — G89.29 CHRONIC NECK PAIN: ICD-10-CM

## 2024-08-12 DIAGNOSIS — M25.561 CHRONIC PAIN OF RIGHT KNEE: ICD-10-CM

## 2024-08-12 DIAGNOSIS — M54.50 CHRONIC BILATERAL LOW BACK PAIN WITHOUT SCIATICA: ICD-10-CM

## 2024-08-12 DIAGNOSIS — G89.29 CHRONIC RIGHT HIP PAIN: ICD-10-CM

## 2024-08-12 DIAGNOSIS — E78.49 FAMILIAL HYPERLIPIDEMIA: ICD-10-CM

## 2024-08-12 DIAGNOSIS — E66.01 CLASS 2 SEVERE OBESITY DUE TO EXCESS CALORIES WITH SERIOUS COMORBIDITY AND BODY MASS INDEX (BMI) OF 38.0 TO 38.9 IN ADULT (HCC): ICD-10-CM

## 2024-08-12 DIAGNOSIS — Z12.31 SCREENING MAMMOGRAM FOR BREAST CANCER: Primary | ICD-10-CM

## 2024-08-12 DIAGNOSIS — E55.9 VITAMIN D DEFICIENCY: ICD-10-CM

## 2024-08-12 DIAGNOSIS — Z11.59 ENCOUNTER FOR HEPATITIS C SCREENING TEST FOR LOW RISK PATIENT: ICD-10-CM

## 2024-08-12 DIAGNOSIS — Z00.00 WELCOME TO MEDICARE PREVENTIVE VISIT: ICD-10-CM

## 2024-08-12 DIAGNOSIS — Z87.891 PERSONAL HISTORY OF TOBACCO USE: ICD-10-CM

## 2024-08-12 PROBLEM — M46.1 SACROILIITIS, NOT ELSEWHERE CLASSIFIED (HCC): Status: ACTIVE | Noted: 2024-08-12

## 2024-08-12 RX ORDER — METHOCARBAMOL 500 MG/1
500 TABLET, FILM COATED ORAL 2 TIMES DAILY PRN
Qty: 60 TABLET | Refills: 3 | Status: SHIPPED | OUTPATIENT
Start: 2024-08-12

## 2024-08-12 ASSESSMENT — ENCOUNTER SYMPTOMS
SHORTNESS OF BREATH: 0
NAUSEA: 0
WHEEZING: 0
COUGH: 0
DIARRHEA: 0
ABDOMINAL PAIN: 0
SORE THROAT: 0
CHEST TIGHTNESS: 0

## 2024-08-12 NOTE — PATIENT INSTRUCTIONS
follow-up, he or she will help you understand what to do next.  After a lung cancer screening, you can go back to your usual activities right away.  A lung cancer screening test can't tell if you have lung cancer. If your results are positive, your doctor can't tell whether an abnormal finding is a harmless nodule, cancer, or something else without doing more tests.  What can you do to help prevent lung cancer?  Some lung cancers can't be prevented. But if you smoke, quitting smoking is the best step you can take to prevent lung cancer. If you want to quit, your doctor can recommend medicines or other ways to help.  Follow-up care is a key part of your treatment and safety. Be sure to make and go to all appointments, and call your doctor if you are having problems. It's also a good idea to know your test results and keep a list of the medicines you take.  Where can you learn more?  Go to https://www.[employer].net/patientEd and enter Q940 to learn more about \"Learning About Lung Cancer Screening.\"  Current as of: October 25, 2023  Content Version: 14.1  © 4398-0300 [employer].   Care instructions adapted under license by [employer]. If you have questions about a medical condition or this instruction, always ask your healthcare professional. [employer] disclaims any warranty or liability for your use of this information.           Learning About Vision Tests  What are vision tests?     The four most common vision tests are visual acuity tests, refraction, visual field tests, and color vision tests.  Visual acuity (sharpness) tests  These tests are used:  To see if you need glasses or contact lenses.  To monitor an eye problem.  To check an eye injury.  Visual acuity tests are done as part of routine exams. You may also have this test when you get your 's license or apply for some types of jobs.  Visual field tests  These tests are used:  To check for vision loss in any area of your

## 2024-08-12 NOTE — PROGRESS NOTES
Medicare Annual Wellness Visit    Avelina Torres is here for 3 Month Follow-Up    Assessment & Plan   Screening mammogram for breast cancer  -     CHRISTOPHE DIGITAL SCREEN W OR WO CAD BILATERAL; Future  Welcome to Medicare preventive visit  Class 2 severe obesity due to excess calories with serious comorbidity and body mass index (BMI) of 38.0 to 38.9 in adult (HCC)  Severe obesity (BMI 35.0-39.9) with comorbidity (HCC)  Chronic pain of right knee  Chronic right hip pain  -     methocarbamol (ROBAXIN) 500 MG tablet; Take 1 tablet by mouth 2 times daily as needed (pain), Disp-60 tablet, R-3Normal  Chronic bilateral low back pain without sciatica  Chronic neck pain  -     methocarbamol (ROBAXIN) 500 MG tablet; Take 1 tablet by mouth 2 times daily as needed (pain), Disp-60 tablet, R-3Normal  Primary insomnia  Familial hyperlipidemia  -     Comprehensive Metabolic Panel; Future  -     Lipid Panel; Future  -     T4, Free; Future  -     TSH; Future  Vitamin D deficiency  -     Vitamin D 25 Hydroxy; Future  B12 deficiency  -     CBC with Auto Differential; Future  -     Vitamin B12; Future  Personal history of tobacco use  -     UT VISIT TO DISCUSS LUNG CA SCREEN W LDCT  -     CT Lung Screen (Initial/Annual/Baseline); Future  -     UT VISIT TO DISCUSS LUNG CA SCREEN W LDCT  Colon cancer screening  -     Fecal DNA Colorectal cancer screening (Cologuard)  Encounter for hepatitis C screening test for low risk patient  -     Hepatitis C Antibody; Future    Recommendations for Preventive Services Due: see orders and patient instructions/AVS.  Recommended screening schedule for the next 5-10 years is provided to the patient in written form: see Patient Instructions/AVS.     Return in about 3 months (around 11/12/2024) for office visit extended, weight, hyperlipidemia.     Subjective       Patient's complete Health Risk Assessment and screening values have been reviewed and are found in Flowsheets. The following problems were 
condition or this instruction, always ask your healthcare professional. [employer], [employer] disclaims any warranty or liability for your use of this information.      Personalized Preventive Plan for Avelina Torres - 8/12/2024  Medicare offers a range of preventive health benefits. Some of the tests and screenings are paid in full while other may be subject to a deductible, co-insurance, and/or copay.    Some of these benefits include a comprehensive review of your medical history including lifestyle, illnesses that may run in your family, and various assessments and screenings as appropriate.    After reviewing your medical record and screening and assessments performed today your provider may have ordered immunizations, labs, imaging, and/or referrals for you.  A list of these orders (if applicable) as well as your Preventive Care list are included within your After Visit Summary for your review.    Other Preventive Recommendations:    A preventive eye exam performed by an eye specialist is recommended every 1-2 years to screen for glaucoma; cataracts, macular degeneration, and other eye disorders.  A preventive dental visit is recommended every 6 months.  Try to get at least 150 minutes of exercise per week or 10,000 steps per day on a pedometer .  Order or download the FREE \"Exercise & Physical Activity: Your Everyday Guide\" from [employer]. Call 1-661.723.6851 or search [employer] online.  You need 6297-5581 mg of calcium and 2528-5796 IU of vitamin D per day. It is possible to meet your calcium requirement with diet alone, but a vitamin D supplement is usually necessary to meet this goal.  When exposed to the sun, use a sunscreen that protects against both UVA and UVB radiation with an SPF of 30 or greater. Reapply every 2 to 3 hours or after sweating, drying off with a towel, or swimming.  Always wear a seat belt when traveling in a car. Always wear a helmet

## 2024-09-05 LAB — NONINV COLON CA DNA+OCC BLD SCRN STL QL: POSITIVE

## 2024-09-06 DIAGNOSIS — Z12.11 COLON CANCER SCREENING: Primary | ICD-10-CM

## 2024-09-06 DIAGNOSIS — R19.5 POSITIVE COLORECTAL CANCER SCREENING USING COLOGUARD TEST: ICD-10-CM

## 2024-10-02 ENCOUNTER — TELEPHONE (OUTPATIENT)
Dept: GASTROENTEROLOGY | Age: 54
End: 2024-10-02

## 2024-11-12 ENCOUNTER — OFFICE VISIT (OUTPATIENT)
Dept: PRIMARY CARE CLINIC | Age: 54
End: 2024-11-12
Payer: MEDICARE

## 2024-11-12 VITALS
WEIGHT: 233 LBS | BODY MASS INDEX: 38.82 KG/M2 | TEMPERATURE: 98 F | DIASTOLIC BLOOD PRESSURE: 82 MMHG | HEART RATE: 89 BPM | OXYGEN SATURATION: 96 % | HEIGHT: 65 IN | SYSTOLIC BLOOD PRESSURE: 130 MMHG

## 2024-11-12 DIAGNOSIS — G89.29 CHRONIC PAIN OF RIGHT KNEE: ICD-10-CM

## 2024-11-12 DIAGNOSIS — M54.2 CHRONIC NECK PAIN: ICD-10-CM

## 2024-11-12 DIAGNOSIS — M48.02 CERVICAL SPINAL STENOSIS: ICD-10-CM

## 2024-11-12 DIAGNOSIS — E78.49 FAMILIAL HYPERLIPIDEMIA: ICD-10-CM

## 2024-11-12 DIAGNOSIS — E66.812 CLASS 2 SEVERE OBESITY DUE TO EXCESS CALORIES WITH SERIOUS COMORBIDITY AND BODY MASS INDEX (BMI) OF 38.0 TO 38.9 IN ADULT: ICD-10-CM

## 2024-11-12 DIAGNOSIS — E66.01 CLASS 2 SEVERE OBESITY DUE TO EXCESS CALORIES WITH SERIOUS COMORBIDITY AND BODY MASS INDEX (BMI) OF 38.0 TO 38.9 IN ADULT: ICD-10-CM

## 2024-11-12 DIAGNOSIS — M25.561 CHRONIC PAIN OF RIGHT KNEE: ICD-10-CM

## 2024-11-12 DIAGNOSIS — F51.01 PRIMARY INSOMNIA: ICD-10-CM

## 2024-11-12 DIAGNOSIS — G89.29 CHRONIC NECK PAIN: ICD-10-CM

## 2024-11-12 DIAGNOSIS — M54.50 CHRONIC BILATERAL LOW BACK PAIN WITHOUT SCIATICA: ICD-10-CM

## 2024-11-12 DIAGNOSIS — G89.29 CHRONIC BILATERAL LOW BACK PAIN WITHOUT SCIATICA: ICD-10-CM

## 2024-11-12 PROBLEM — M46.1 SACROILIITIS, NOT ELSEWHERE CLASSIFIED (HCC): Status: RESOLVED | Noted: 2024-08-12 | Resolved: 2024-11-12

## 2024-11-12 PROCEDURE — G8417 CALC BMI ABV UP PARAM F/U: HCPCS | Performed by: NURSE PRACTITIONER

## 2024-11-12 PROCEDURE — 99214 OFFICE O/P EST MOD 30 MIN: CPT | Performed by: NURSE PRACTITIONER

## 2024-11-12 PROCEDURE — 1036F TOBACCO NON-USER: CPT | Performed by: NURSE PRACTITIONER

## 2024-11-12 PROCEDURE — G8427 DOCREV CUR MEDS BY ELIG CLIN: HCPCS | Performed by: NURSE PRACTITIONER

## 2024-11-12 PROCEDURE — 3017F COLORECTAL CA SCREEN DOC REV: CPT | Performed by: NURSE PRACTITIONER

## 2024-11-12 PROCEDURE — G8484 FLU IMMUNIZE NO ADMIN: HCPCS | Performed by: NURSE PRACTITIONER

## 2024-11-12 RX ORDER — SEMAGLUTIDE 0.25 MG/.5ML
0.25 INJECTION, SOLUTION SUBCUTANEOUS
Qty: 2 ML | Refills: 2 | Status: SHIPPED | OUTPATIENT
Start: 2024-11-12

## 2024-11-12 RX ORDER — TEMAZEPAM 15 MG/1
15 CAPSULE ORAL NIGHTLY PRN
Qty: 30 CAPSULE | Refills: 2 | Status: SHIPPED | OUTPATIENT
Start: 2024-11-12 | End: 2025-02-10

## 2024-11-12 RX ORDER — BACLOFEN 10 MG/1
10 TABLET ORAL 3 TIMES DAILY PRN
Qty: 90 TABLET | Refills: 5 | Status: SHIPPED | OUTPATIENT
Start: 2024-11-12

## 2024-11-12 SDOH — ECONOMIC STABILITY: FOOD INSECURITY: WITHIN THE PAST 12 MONTHS, YOU WORRIED THAT YOUR FOOD WOULD RUN OUT BEFORE YOU GOT MONEY TO BUY MORE.: NEVER TRUE

## 2024-11-12 SDOH — ECONOMIC STABILITY: INCOME INSECURITY: HOW HARD IS IT FOR YOU TO PAY FOR THE VERY BASICS LIKE FOOD, HOUSING, MEDICAL CARE, AND HEATING?: NOT HARD AT ALL

## 2024-11-12 SDOH — ECONOMIC STABILITY: FOOD INSECURITY: WITHIN THE PAST 12 MONTHS, THE FOOD YOU BOUGHT JUST DIDN'T LAST AND YOU DIDN'T HAVE MONEY TO GET MORE.: NEVER TRUE

## 2024-11-12 ASSESSMENT — ENCOUNTER SYMPTOMS
COUGH: 0
ABDOMINAL PAIN: 0
BACK PAIN: 1
SORE THROAT: 0
CHEST TIGHTNESS: 0
SHORTNESS OF BREATH: 0
DIARRHEA: 0
WHEEZING: 0
NAUSEA: 0

## 2024-11-12 NOTE — PROGRESS NOTES
Cervical spinal stenosis    - External Referral To Pain Clinic  - baclofen (LIORESAL) 10 MG tablet; Take 1 tablet by mouth 3 times daily as needed (pain)  Dispense: 90 tablet; Refill: 5    7. Chronic pain of right knee  -Overall stable.  Will refer to Ortho with any worsening.  Referred previously but she elected to cancel.         Return in about 3 months (around 2/12/2025) for office visit extended, neck pain, back pain, hyperlipidemia.    Avelina was seen today for 3 month follow-up.    Diagnoses and all orders for this visit:    Class 2 severe obesity due to excess calories with serious comorbidity and body mass index (BMI) of 38.0 to 38.9 in adult  -     Semaglutide-Weight Management (WEGOVY) 0.25 MG/0.5ML SOAJ SC injection; Inject 0.25 mg into the skin every 7 days    Familial hyperlipidemia  -     Semaglutide-Weight Management (WEGOVY) 0.25 MG/0.5ML SOAJ SC injection; Inject 0.25 mg into the skin every 7 days    Primary insomnia  -     temazepam (RESTORIL) 15 MG capsule; Take 1 capsule by mouth nightly as needed for Sleep for up to 90 days. Max Daily Amount: 15 mg    Chronic bilateral low back pain without sciatica  -     External Referral To Pain Clinic  -     baclofen (LIORESAL) 10 MG tablet; Take 1 tablet by mouth 3 times daily as needed (pain)    Chronic neck pain  -     External Referral To Pain Clinic  -     baclofen (LIORESAL) 10 MG tablet; Take 1 tablet by mouth 3 times daily as needed (pain)    Cervical spinal stenosis  -     External Referral To Pain Clinic  -     baclofen (LIORESAL) 10 MG tablet; Take 1 tablet by mouth 3 times daily as needed (pain)    Chronic pain of right knee      Medications Discontinued During This Encounter   Medication Reason    methocarbamol (ROBAXIN) 500 MG tablet LIST CLEANUP     There are no Patient Instructions on file for this visit.    Patient voicesunderstanding and agrees to plans along with risks and benefits of plan.    Counseling:  Avelina Torres's case,

## 2025-01-11 NOTE — H&P
Interventions: None indicated  8. Referrals: None indicated  9. Records requested: None indicated  10. Lifestyle goal (weight, smoking, etc): Improved functioning, pain reduction, minimize medications    Follow up - ***      Attending Attestation:

## 2025-01-13 ENCOUNTER — TELEPHONE (OUTPATIENT)
Dept: PAIN MANAGEMENT | Age: 55
End: 2025-01-13

## 2025-01-13 ENCOUNTER — HOSPITAL ENCOUNTER (OUTPATIENT)
Dept: PAIN MANAGEMENT | Age: 55
Discharge: HOME | End: 2025-01-13

## 2025-01-13 NOTE — TELEPHONE ENCOUNTER
Patient did call to let us know that she would not be coming to her appointment.  She left a message.  She is disabled and her  is the one who has to bring her to all of her appointments and he got called into work.

## 2025-01-24 DIAGNOSIS — Z11.59 ENCOUNTER FOR HEPATITIS C SCREENING TEST FOR LOW RISK PATIENT: ICD-10-CM

## 2025-01-24 DIAGNOSIS — E78.49 FAMILIAL HYPERLIPIDEMIA: ICD-10-CM

## 2025-01-24 DIAGNOSIS — F51.01 PRIMARY INSOMNIA: ICD-10-CM

## 2025-01-24 DIAGNOSIS — E53.8 B12 DEFICIENCY: ICD-10-CM

## 2025-01-24 DIAGNOSIS — E55.9 VITAMIN D DEFICIENCY: ICD-10-CM

## 2025-01-24 LAB
25(OH)D3 SERPL-MCNC: 61.8 NG/ML
ALBUMIN SERPL-MCNC: 4.1 G/DL (ref 3.5–5.2)
ALP SERPL-CCNC: 109 U/L (ref 35–104)
ALT SERPL-CCNC: 14 U/L (ref 5–33)
ANION GAP SERPL CALCULATED.3IONS-SCNC: 9 MMOL/L (ref 8–16)
AST SERPL-CCNC: 16 U/L (ref 5–32)
BASOPHILS # BLD: 0 K/UL (ref 0–0.2)
BASOPHILS NFR BLD: 0.3 % (ref 0–1)
BILIRUB SERPL-MCNC: 0.4 MG/DL (ref 0.2–1.2)
BUN SERPL-MCNC: 12 MG/DL (ref 6–20)
CALCIUM SERPL-MCNC: 9.1 MG/DL (ref 8.6–10)
CHLORIDE SERPL-SCNC: 102 MMOL/L (ref 98–107)
CHOLEST SERPL-MCNC: 146 MG/DL (ref 0–199)
CO2 SERPL-SCNC: 31 MMOL/L (ref 22–29)
CREAT SERPL-MCNC: 0.6 MG/DL (ref 0.5–0.9)
EOSINOPHIL # BLD: 0.3 K/UL (ref 0–0.6)
EOSINOPHIL NFR BLD: 3.5 % (ref 0–5)
ERYTHROCYTE [DISTWIDTH] IN BLOOD BY AUTOMATED COUNT: 13.4 % (ref 11.5–14.5)
GLUCOSE SERPL-MCNC: 103 MG/DL (ref 70–99)
HCT VFR BLD AUTO: 43.1 % (ref 37–47)
HCV AB SERPL QL IA: NORMAL
HDLC SERPL-MCNC: 52 MG/DL (ref 40–60)
HGB BLD-MCNC: 13.6 G/DL (ref 12–16)
IMM GRANULOCYTES # BLD: 0 K/UL
LDLC SERPL CALC-MCNC: 78 MG/DL
LYMPHOCYTES # BLD: 1.7 K/UL (ref 1.1–4.5)
LYMPHOCYTES NFR BLD: 24.3 % (ref 20–40)
MCH RBC QN AUTO: 28.8 PG (ref 27–31)
MCHC RBC AUTO-ENTMCNC: 31.6 G/DL (ref 33–37)
MCV RBC AUTO: 91.1 FL (ref 81–99)
MONOCYTES # BLD: 0.5 K/UL (ref 0–0.9)
MONOCYTES NFR BLD: 6.8 % (ref 0–10)
NEUTROPHILS # BLD: 4.6 K/UL (ref 1.5–7.5)
NEUTS SEG NFR BLD: 65 % (ref 50–65)
PLATELET # BLD AUTO: 202 K/UL (ref 130–400)
PMV BLD AUTO: 10.3 FL (ref 9.4–12.3)
POTASSIUM SERPL-SCNC: 4.1 MMOL/L (ref 3.5–5.1)
PROT SERPL-MCNC: 7.1 G/DL (ref 6.4–8.3)
RBC # BLD AUTO: 4.73 M/UL (ref 4.2–5.4)
SODIUM SERPL-SCNC: 142 MMOL/L (ref 136–145)
T4 FREE SERPL-MCNC: 1.03 NG/DL (ref 0.93–1.7)
TRIGL SERPL-MCNC: 79 MG/DL (ref 0–149)
TSH SERPL DL<=0.005 MIU/L-ACNC: 1.79 UIU/ML (ref 0.27–4.2)
VIT B12 SERPL-MCNC: 829 PG/ML (ref 232–1245)
WBC # BLD AUTO: 7.1 K/UL (ref 4.8–10.8)

## 2025-01-26 RX ORDER — ROSUVASTATIN CALCIUM 5 MG/1
5 TABLET, COATED ORAL NIGHTLY
Qty: 90 TABLET | Refills: 2 | Status: SHIPPED | OUTPATIENT
Start: 2025-01-26

## 2025-01-27 NOTE — TELEPHONE ENCOUNTER
She is requesting temazepam 30 mg but told me at last visit it made her too groggy and wanted to go back to 15 mg.  Which dose is she taking/wanting?  She will have to update her UDS and contract at her appt next month.

## 2025-01-28 RX ORDER — TEMAZEPAM 30 MG/1
CAPSULE ORAL
Qty: 30 CAPSULE | OUTPATIENT
Start: 2025-01-28

## 2025-02-28 ENCOUNTER — OFFICE VISIT (OUTPATIENT)
Dept: PRIMARY CARE CLINIC | Age: 55
End: 2025-02-28

## 2025-02-28 VITALS
WEIGHT: 251 LBS | HEIGHT: 65 IN | TEMPERATURE: 98 F | HEART RATE: 85 BPM | OXYGEN SATURATION: 96 % | DIASTOLIC BLOOD PRESSURE: 84 MMHG | SYSTOLIC BLOOD PRESSURE: 128 MMHG | BODY MASS INDEX: 41.82 KG/M2

## 2025-02-28 DIAGNOSIS — M48.02 CERVICAL SPINAL STENOSIS: ICD-10-CM

## 2025-02-28 DIAGNOSIS — F51.01 PRIMARY INSOMNIA: ICD-10-CM

## 2025-02-28 DIAGNOSIS — G89.29 CHRONIC NECK PAIN: ICD-10-CM

## 2025-02-28 DIAGNOSIS — E66.01 CLASS 2 SEVERE OBESITY DUE TO EXCESS CALORIES WITH SERIOUS COMORBIDITY AND BODY MASS INDEX (BMI) OF 38.0 TO 38.9 IN ADULT: ICD-10-CM

## 2025-02-28 DIAGNOSIS — R73.9 HYPERGLYCEMIA: Primary | ICD-10-CM

## 2025-02-28 DIAGNOSIS — Z51.81 MEDICATION MONITORING ENCOUNTER: ICD-10-CM

## 2025-02-28 DIAGNOSIS — R63.5 WEIGHT GAIN: ICD-10-CM

## 2025-02-28 DIAGNOSIS — M54.2 CHRONIC NECK PAIN: ICD-10-CM

## 2025-02-28 DIAGNOSIS — E66.812 CLASS 2 SEVERE OBESITY DUE TO EXCESS CALORIES WITH SERIOUS COMORBIDITY AND BODY MASS INDEX (BMI) OF 38.0 TO 38.9 IN ADULT: ICD-10-CM

## 2025-02-28 DIAGNOSIS — E55.9 VITAMIN D DEFICIENCY: ICD-10-CM

## 2025-02-28 DIAGNOSIS — R73.9 HYPERGLYCEMIA: ICD-10-CM

## 2025-02-28 DIAGNOSIS — R63.5 WEIGHT GAIN: Primary | ICD-10-CM

## 2025-02-28 DIAGNOSIS — G89.29 CHRONIC BILATERAL LOW BACK PAIN WITHOUT SCIATICA: ICD-10-CM

## 2025-02-28 DIAGNOSIS — E78.49 FAMILIAL HYPERLIPIDEMIA: ICD-10-CM

## 2025-02-28 DIAGNOSIS — Z87.891 PERSONAL HISTORY OF TOBACCO USE: ICD-10-CM

## 2025-02-28 DIAGNOSIS — E53.8 B12 DEFICIENCY: ICD-10-CM

## 2025-02-28 DIAGNOSIS — M54.50 CHRONIC BILATERAL LOW BACK PAIN WITHOUT SCIATICA: ICD-10-CM

## 2025-02-28 LAB
ESTRADIOL SERPL-SCNC: 17.3 PG/ML
FSH SERPL-SCNC: 60.2 MIU/ML
HBA1C MFR BLD: 5.7 % (ref 4–5.6)
LH SERPL-ACNC: 47.2 MIU/ML
PROGEST SERPL-MCNC: 0.14 NG/ML

## 2025-02-28 RX ORDER — BACLOFEN 20 MG/1
20 TABLET ORAL 3 TIMES DAILY PRN
Qty: 90 TABLET | Refills: 3 | Status: SHIPPED | OUTPATIENT
Start: 2025-02-28

## 2025-02-28 SDOH — ECONOMIC STABILITY: FOOD INSECURITY: WITHIN THE PAST 12 MONTHS, YOU WORRIED THAT YOUR FOOD WOULD RUN OUT BEFORE YOU GOT MONEY TO BUY MORE.: NEVER TRUE

## 2025-02-28 SDOH — ECONOMIC STABILITY: FOOD INSECURITY: WITHIN THE PAST 12 MONTHS, THE FOOD YOU BOUGHT JUST DIDN'T LAST AND YOU DIDN'T HAVE MONEY TO GET MORE.: NEVER TRUE

## 2025-02-28 ASSESSMENT — PATIENT HEALTH QUESTIONNAIRE - PHQ9
SUM OF ALL RESPONSES TO PHQ QUESTIONS 1-9: 0
SUM OF ALL RESPONSES TO PHQ9 QUESTIONS 1 & 2: 0
2. FEELING DOWN, DEPRESSED OR HOPELESS: NOT AT ALL
SUM OF ALL RESPONSES TO PHQ QUESTIONS 1-9: 0
1. LITTLE INTEREST OR PLEASURE IN DOING THINGS: NOT AT ALL
SUM OF ALL RESPONSES TO PHQ QUESTIONS 1-9: 0
SUM OF ALL RESPONSES TO PHQ QUESTIONS 1-9: 0

## 2025-02-28 ASSESSMENT — ENCOUNTER SYMPTOMS
NAUSEA: 0
ABDOMINAL PAIN: 0
DIARRHEA: 0
COUGH: 0
CHEST TIGHTNESS: 0
SORE THROAT: 0
SHORTNESS OF BREATH: 0
WHEEZING: 0

## 2025-02-28 NOTE — PROGRESS NOTES
Ms.Scarlet LUCIO Torres is a 54 y.o. female who presents today for  Chief Complaint   Patient presents with    3 Month Follow-Up       HPI:  Here for routine follow-up    She took compounded semaglutide briefly but discontinued due to cost.  She is interested in resuming but states her insurance may cover Ozempic or Mounjaro.    She was referred to [facility] pain management for chronic back and neck pain.  She was scheduled to be seen in January but had to reschedule due to transportation issues.  Appointment in March.  At last visit she was prescribed baclofen 10 mg tid prn.  This has not been effective.  She has tried and failed Robaxin, Flexeril, tizanidine, Tylenol.  She avoids NSAIDs due to GERD and history of small bowel perforation.    She has chronic insomnia.  She is taking temazepam 15 mg hs prn and tolerates well.  The 30 mg dose made her feel too groggy.  She has failed treatment with trazodone, nortriptyline, melatonin, OTC sleep meds     She had a positive Cologuard in August.  She was referred to GI and was scheduled for colonoscopy but canceled and has not rescheduled.  She defers at this time.    Hyperlipidemia  Current regimen:  -Rosuvastatin 5 mg daily  Tolerates well without side effects.    Labs reviewed  Lab Results   Component Value Date    WBC 7.1 01/24/2025    HGB 13.6 01/24/2025    HCT 43.1 01/24/2025    MCV 91.1 01/24/2025     01/24/2025     Lab Results   Component Value Date     01/24/2025    K 4.1 01/24/2025     01/24/2025    CO2 31 (H) 01/24/2025    BUN 12 01/24/2025    CREATININE 0.6 01/24/2025    GLUCOSE 103 (H) 01/24/2025    CALCIUM 9.1 01/24/2025    BILITOT 0.4 01/24/2025    ALKPHOS 109 (H) 01/24/2025    AST 16 01/24/2025    ALT 14 01/24/2025    LABGLOM >90 01/24/2025    GFRAA >59 05/19/2022     Lab Results   Component Value Date    CHOL 146 01/24/2025    TRIG 79 01/24/2025    HDL 52 01/24/2025    LDL 78 01/24/2025     Lab Results   Component Value Date    TSH 1.790

## 2025-03-16 NOTE — PROGRESS NOTES
Primary Physician: Gerri Patel APRN    CHIEF COMPLAINT:chronic neck pain and lumbar radicular    HISTORY OF PRESENT ILLNESS:  Ms. Avelina Torres is 54 y.o. female with hx of elevated BMI (41), depression and anxiety, insomnia on temazepam, hx of small bowel perforation  is seen in consultation at the request of her PCP for evaluation of chronic neck and low back pain. She has seen Dr. Fuchs for her neck pain back in March of 2024 and at that time recommended non-op treatments including physical therapy. She has reportedly tried unspecified injections in the past without relief. She had a cervical MRI that has demonstrated moderate stenosis at C5-6 and C7-T1 as well as multilevel NFS. She was previously discharged at our clinic in the past allegedly due to failure to complete a urine drug screen. Patient reports that she was not able to produce enough urine and was then dismissed. She tried to do a saliva test but her mouth was dry and this was therefore discarded. She was very upset about this as she denies any aberrancy and has not used drugs or alcohol in the past.    Patient presents today with right knee pain that is 10/10, bilateral non-radiating low back pain that is 8/10 and neck pain that is 7/10 and also non-radiating. Her back pain is described as aching, stabbing, sharp and shooting. She denies numbness/tingling in her feet. Her pain is especially worse with standing and lumbar extension and relieved somewhat by bending forward; however, she also has pain with sitting for prolonged periods of time. She has had SI joint injections without relief.    In regards to her neck, she has had trigger point injections without relief. Her pain is worse on the left side and is non-radiating. She has been dropping objects but says this has been unchanged since before her last MRI in 2023.    In addition to her back and neck pain she has had chronic right knee pain and swelling with instructions to see

## 2025-03-17 ENCOUNTER — OFFICE VISIT (OUTPATIENT)
Dept: PAIN MANAGEMENT | Age: 55
End: 2025-03-17
Payer: MEDICARE

## 2025-03-17 ENCOUNTER — HOSPITAL ENCOUNTER (OUTPATIENT)
Dept: GENERAL RADIOLOGY | Age: 55
Discharge: HOME | End: 2025-03-17
Payer: MEDICARE

## 2025-03-17 VITALS
WEIGHT: 252 LBS | OXYGEN SATURATION: 97 % | TEMPERATURE: 97.1 F | RESPIRATION RATE: 16 BRPM | HEART RATE: 100 BPM | BODY MASS INDEX: 41.99 KG/M2 | DIASTOLIC BLOOD PRESSURE: 78 MMHG | HEIGHT: 65 IN | SYSTOLIC BLOOD PRESSURE: 121 MMHG

## 2025-03-17 DIAGNOSIS — M25.561 CHRONIC PAIN OF RIGHT KNEE: ICD-10-CM

## 2025-03-17 DIAGNOSIS — G89.29 CHRONIC BILATERAL LOW BACK PAIN WITHOUT SCIATICA: ICD-10-CM

## 2025-03-17 DIAGNOSIS — M54.50 CHRONIC BILATERAL LOW BACK PAIN WITHOUT SCIATICA: ICD-10-CM

## 2025-03-17 DIAGNOSIS — G89.4 CHRONIC PAIN SYNDROME: ICD-10-CM

## 2025-03-17 DIAGNOSIS — M54.59 LUMBAR FACET JOINT PAIN: ICD-10-CM

## 2025-03-17 DIAGNOSIS — G89.29 CHRONIC PAIN OF RIGHT KNEE: ICD-10-CM

## 2025-03-17 DIAGNOSIS — M54.2 PAIN OF CERVICAL FACET JOINT: ICD-10-CM

## 2025-03-17 DIAGNOSIS — M54.2 CHRONIC NECK PAIN: Primary | ICD-10-CM

## 2025-03-17 DIAGNOSIS — M54.2 CHRONIC NECK PAIN: ICD-10-CM

## 2025-03-17 DIAGNOSIS — M48.02 CERVICAL SPINAL STENOSIS: ICD-10-CM

## 2025-03-17 DIAGNOSIS — G89.29 CHRONIC NECK PAIN: Primary | ICD-10-CM

## 2025-03-17 DIAGNOSIS — G89.29 CHRONIC NECK PAIN: ICD-10-CM

## 2025-03-17 PROCEDURE — 99204 OFFICE O/P NEW MOD 45 MIN: CPT | Performed by: STUDENT IN AN ORGANIZED HEALTH CARE EDUCATION/TRAINING PROGRAM

## 2025-03-17 PROCEDURE — 72040 X-RAY EXAM NECK SPINE 2-3 VW: CPT

## 2025-03-17 ASSESSMENT — PATIENT HEALTH QUESTIONNAIRE - PHQ9
SUM OF ALL RESPONSES TO PHQ QUESTIONS 1-9: 0
2. FEELING DOWN, DEPRESSED OR HOPELESS: NOT AT ALL
SUM OF ALL RESPONSES TO PHQ QUESTIONS 1-9: 0
1. LITTLE INTEREST OR PLEASURE IN DOING THINGS: NOT AT ALL

## 2025-04-10 ENCOUNTER — RESULTS FOLLOW-UP (OUTPATIENT)
Dept: PRIMARY CARE CLINIC | Age: 55
End: 2025-04-10

## 2025-04-10 ENCOUNTER — HOSPITAL ENCOUNTER (OUTPATIENT)
Dept: WOMENS IMAGING | Age: 55
Discharge: HOME | End: 2025-04-10
Payer: MEDICARE

## 2025-04-10 DIAGNOSIS — Z12.31 SCREENING MAMMOGRAM FOR BREAST CANCER: ICD-10-CM

## 2025-04-10 PROCEDURE — 77063 BREAST TOMOSYNTHESIS BI: CPT

## 2025-04-11 DIAGNOSIS — K21.9 GASTROESOPHAGEAL REFLUX DISEASE WITHOUT ESOPHAGITIS: ICD-10-CM

## 2025-04-11 RX ORDER — OMEPRAZOLE 40 MG/1
40 CAPSULE, DELAYED RELEASE ORAL
Qty: 90 CAPSULE | Refills: 0 | Status: SHIPPED | OUTPATIENT
Start: 2025-04-11

## 2025-04-23 ENCOUNTER — HOSPITAL ENCOUNTER (OUTPATIENT)
Dept: PAIN MANAGEMENT | Age: 55
Discharge: HOME | End: 2025-04-23
Payer: MEDICARE

## 2025-04-23 VITALS
SYSTOLIC BLOOD PRESSURE: 124 MMHG | TEMPERATURE: 96.7 F | RESPIRATION RATE: 18 BRPM | DIASTOLIC BLOOD PRESSURE: 99 MMHG | HEART RATE: 101 BPM | OXYGEN SATURATION: 98 %

## 2025-04-23 DIAGNOSIS — R52 PAIN MANAGEMENT: ICD-10-CM

## 2025-04-23 PROCEDURE — 64493 INJ PARAVERT F JNT L/S 1 LEV: CPT

## 2025-04-23 PROCEDURE — 6360000002 HC RX W HCPCS

## 2025-04-23 PROCEDURE — 99152 MOD SED SAME PHYS/QHP 5/>YRS: CPT

## 2025-04-23 PROCEDURE — 64494 INJ PARAVERT F JNT L/S 2 LEV: CPT | Performed by: STUDENT IN AN ORGANIZED HEALTH CARE EDUCATION/TRAINING PROGRAM

## 2025-04-23 PROCEDURE — 6370000000 HC RX 637 (ALT 250 FOR IP)

## 2025-04-23 PROCEDURE — 64494 INJ PARAVERT F JNT L/S 2 LEV: CPT

## 2025-04-23 PROCEDURE — 64493 INJ PARAVERT F JNT L/S 1 LEV: CPT | Performed by: STUDENT IN AN ORGANIZED HEALTH CARE EDUCATION/TRAINING PROGRAM

## 2025-04-23 RX ORDER — BUPIVACAINE HYDROCHLORIDE 5 MG/ML
6 INJECTION, SOLUTION EPIDURAL; INTRACAUDAL; PERINEURAL ONCE
Status: DISCONTINUED | OUTPATIENT
Start: 2025-04-23 | End: 2025-04-25 | Stop reason: HOSPADM

## 2025-04-23 RX ORDER — DIAZEPAM 2 MG/1
2 TABLET ORAL ONCE
Status: COMPLETED | OUTPATIENT
Start: 2025-04-23 | End: 2025-04-23

## 2025-04-23 RX ORDER — LIDOCAINE HYDROCHLORIDE 10 MG/ML
10 INJECTION, SOLUTION EPIDURAL; INFILTRATION; INTRACAUDAL; PERINEURAL ONCE
Status: DISCONTINUED | OUTPATIENT
Start: 2025-04-23 | End: 2025-04-25 | Stop reason: HOSPADM

## 2025-04-23 RX ADMIN — DIAZEPAM 2 MG: 2 TABLET ORAL at 14:40

## 2025-04-23 ASSESSMENT — PAIN - FUNCTIONAL ASSESSMENT
PAIN_FUNCTIONAL_ASSESSMENT: 0-10
PAIN_FUNCTIONAL_ASSESSMENT: 0-10

## 2025-04-23 NOTE — PROGRESS NOTES
Procedure:  Level of Consciousness: [x]Alert [x]Oriented []Disoriented []Lethargic  Anxiety Level: [x]Calm []Anxious []Depressed []Other  Skin: [x]Warm [x]Dry []Cool []Moist []Intact []Other  Cardiovascular: []Palpitations: [x]Never []Occasionally []Frequently  Chest Pain: [x]No []Yes  Respiratory:  [x]Unlabored []Labored []Cough ([] Productive []Unproductive)  HCG Required: [x]No []Yes   Results: []Negative []Positive  Knowledge Level:        [x]Patient/Other verbalized understanding of pre-procedure instructions.        [x]Assessment of post-op care needs (transportation, responsible caregiver)        [x]Able to discuss health care problems and how to deal with it.  Factors that Affect Teaching:        Language Barrier: [x]No []Yes - why:        Hearing Loss:        [x]No []Yes            Corrective Device:  []Yes []No        Vision Loss:           [x]No []Yes            Corrective Device:  []Yes []No        Memory Loss:       [x]No []Yes            []Short Term []Long Term  Motivational Level:  [x]Asks Questions                  []Extremely Anxious       [x]Seems Interested               []Seems Uninterested                  []Denies need for Education  Risk for Injury:  [x]Patient oriented to person, place and time  []History of frequent falls/loss of balance  Nutritional:  []Change in appetite   []Weight Gain   []Weight Loss  Functional:  []Requires assistance with ADL's  
no

## 2025-04-23 NOTE — PROCEDURES
The patient's History and Physical  was reviewed with the patient and I examined the patient. There was no change. The surgical site was confirmed by the patient and me.            Plan: The risks, benefits, expected outcome, and alternative to the recommended procedure have been discussed with the patient. Patient understands and wants to proceed with the procedure.      -------------------------------------------------------------------------------------    Procedures  Bilateral L3-L5 Lumbar Medial Branch Block    Procedure Summary  Indications: Avelina is an 54 y.o. female who is having an injection for lumbar spondylosis without myelopathy and lumbar facet pain.    CONSENT: Risks, benefits and options were explained to the patient, all questions were answered and written informed consent was obtained.      PROCEDURE NOTE:  A pre-procedural time out was performed to confirm the correct patient, procedure, side, and site. A sterile field was prepped in standard fashion using Chlorhexidine and draped with sterile towels. The selected medial branch nerves were identified using an ipsilateral oblique fluoroscopic view. The overlying skin and subcutaneous tissue was anesthetized with 1% lidocaine. A 22 gauge 3.5 inch spinal needle was advanced using intermittent fluoroscopy toward the junction of the transverse process and superior articulating process targeting bilateralL3-L5 medial branch nerves. Needle placement was confirmed with biplanar fluoroscopic imaging. The L5 dorsal ramus was targeted at the sacral ala. Following negative aspiration, 0.5 mL of .5% bupivicaine was injected at each location. The needles were re-styletted and withdrawn. The patient's skin was cleaned with alcohol and the injection sites covered with bandages.    EBL: None  COMPLICATIONS: None  PRE-OP PAIN SCORE: 7-9 (started at 7 but pain got to 9 when laying prone)  POST-OP PAIN SCORE:     The patient was monitored for several minutes prior

## 2025-04-24 ENCOUNTER — TELEPHONE (OUTPATIENT)
Dept: PAIN MANAGEMENT | Age: 55
End: 2025-04-24

## 2025-04-24 NOTE — TELEPHONE ENCOUNTER
How much did the shot help?     10       %   For Median Branch Blocks-    Duration of shot?  1 -2 hours            (Goal is 6-8 hours First Injection, 2 hours 2nd injection)   What is your current pain level now?    8/10                     Has your activity level increased since the shot?   No.      Electronically signed by Shamir Armas RN on 4/24/2025 at 2:47 PM

## 2025-04-27 PROBLEM — R73.03 PREDIABETES: Status: ACTIVE | Noted: 2025-04-27

## 2025-05-11 NOTE — PROGRESS NOTES
Ms.Scarlet LUCIO Torres is a 54 y.o. female who presents today for  Chief Complaint   Patient presents with    Follow-up       HPI:  History of Present Illness  The patient presents for follow-up on weight management.    She has been taking phentermine for the past 2 months, which has resulted in a weight loss of approximately 10 pounds. She reports no adverse effects from the medication. Her physical activity is limited due to back pain, but she has made dietary modifications.  Insurance will not cover GLP-1 agonists.    She was evaluated by [facility] pain management in March for chronic back pain.  She underwent bilateral L3-5 lumbar medial branch block 4/23/25 per Dr. Acevedo  but states the treatment has not yielded any significant improvement. She is scheduled for a second procedure on 05/28/2025 and then RFA.    Buprenorphine considered in the future if not improving but she would have to discontinue temazepam.  She avoids NSAIDs due to GERD and history of small bowel perforation.  Robaxin, Flexeril, tizanidine have all been ineffective.  She continues baclofen as needed only but states pain management wants to discontinue this.    She has chronic insomnia.  She is taking temazepam 15 mg hs prn and tolerates well.  The 30 mg dose made her feel too groggy.  She has failed treatment with trazodone, nortriptyline, melatonin, OTC sleep meds      She had a positive Cologuard in August.  She was referred to GI and was scheduled for colonoscopy but canceled and has not rescheduled.      Hyperlipidemia  Current regimen:  -Rosuvastatin 5 mg daily  Tolerates well without side effects.    A1c was in prediabetic range at 5.7 in February    She has had ongoing postmenopausal symptoms.  She has had a past hysterectomy but retains her ovaries.          Results      Review of Systems   Constitutional:  Negative for chills and fatigue.   HENT:  Negative for congestion, ear pain and sore throat.    Respiratory:  Negative for cough, chest

## 2025-05-12 ENCOUNTER — OFFICE VISIT (OUTPATIENT)
Dept: PRIMARY CARE CLINIC | Age: 55
End: 2025-05-12
Payer: MEDICARE

## 2025-05-12 VITALS
SYSTOLIC BLOOD PRESSURE: 138 MMHG | WEIGHT: 243 LBS | DIASTOLIC BLOOD PRESSURE: 84 MMHG | HEIGHT: 65 IN | OXYGEN SATURATION: 98 % | BODY MASS INDEX: 40.48 KG/M2 | HEART RATE: 84 BPM | TEMPERATURE: 97 F

## 2025-05-12 DIAGNOSIS — Z71.89 COUNSELING FOR HORMONE REPLACEMENT THERAPY: ICD-10-CM

## 2025-05-12 DIAGNOSIS — R73.03 PREDIABETES: ICD-10-CM

## 2025-05-12 DIAGNOSIS — M54.50 CHRONIC BILATERAL LOW BACK PAIN WITHOUT SCIATICA: ICD-10-CM

## 2025-05-12 DIAGNOSIS — F51.01 PRIMARY INSOMNIA: ICD-10-CM

## 2025-05-12 DIAGNOSIS — Z51.81 MEDICATION MONITORING ENCOUNTER: ICD-10-CM

## 2025-05-12 DIAGNOSIS — R19.5 POSITIVE COLORECTAL CANCER SCREENING USING COLOGUARD TEST: ICD-10-CM

## 2025-05-12 DIAGNOSIS — E66.813 CLASS 3 SEVERE OBESITY DUE TO EXCESS CALORIES WITH SERIOUS COMORBIDITY AND BODY MASS INDEX (BMI) OF 40.0 TO 44.9 IN ADULT (HCC): ICD-10-CM

## 2025-05-12 DIAGNOSIS — G89.29 CHRONIC BILATERAL LOW BACK PAIN WITHOUT SCIATICA: ICD-10-CM

## 2025-05-12 DIAGNOSIS — Z01.419 WOMEN'S ANNUAL ROUTINE GYNECOLOGICAL EXAMINATION: ICD-10-CM

## 2025-05-12 DIAGNOSIS — E78.49 FAMILIAL HYPERLIPIDEMIA: ICD-10-CM

## 2025-05-12 LAB
ALCOHOL URINE: ABNORMAL
AMPHETAMINE SCREEN URINE: POSITIVE
BARBITURATE SCREEN URINE: ABNORMAL
BENZODIAZEPINE SCREEN, URINE: ABNORMAL
BUPRENORPHINE URINE: ABNORMAL
COCAINE METABOLITE SCREEN URINE: ABNORMAL
FENTANYL SCREEN, URINE: ABNORMAL
GABAPENTIN SCREEN, URINE: ABNORMAL
MDMA, URINE: ABNORMAL
METHADONE SCREEN, URINE: ABNORMAL
METHAMPHETAMINE, URINE: ABNORMAL
OPIATE SCREEN URINE: ABNORMAL
OXYCODONE SCREEN URINE: ABNORMAL
PHENCYCLIDINE SCREEN URINE: ABNORMAL
PROPOXYPHENE SCREEN, URINE: ABNORMAL
SYNTHETIC CANNABINOIDS(K2) SCREEN, URINE: ABNORMAL
THC SCREEN, URINE: ABNORMAL
TRAMADOL SCREEN URINE: ABNORMAL
TRICYCLIC ANTIDEPRESSANTS, UR: ABNORMAL

## 2025-05-12 PROCEDURE — 80305 DRUG TEST PRSMV DIR OPT OBS: CPT | Performed by: NURSE PRACTITIONER

## 2025-05-12 PROCEDURE — G2211 COMPLEX E/M VISIT ADD ON: HCPCS | Performed by: NURSE PRACTITIONER

## 2025-05-12 PROCEDURE — 99214 OFFICE O/P EST MOD 30 MIN: CPT | Performed by: NURSE PRACTITIONER

## 2025-05-12 RX ORDER — PHENTERMINE HYDROCHLORIDE 37.5 MG/1
37.5 TABLET ORAL
Qty: 30 TABLET | Refills: 2 | Status: SHIPPED | OUTPATIENT
Start: 2025-05-12 | End: 2025-08-10

## 2025-05-12 ASSESSMENT — ENCOUNTER SYMPTOMS: BACK PAIN: 1

## 2025-06-09 ENCOUNTER — OFFICE VISIT (OUTPATIENT)
Dept: PAIN MANAGEMENT | Age: 55
End: 2025-06-09
Payer: MEDICARE

## 2025-06-09 VITALS
HEART RATE: 93 BPM | HEIGHT: 64 IN | WEIGHT: 240.8 LBS | RESPIRATION RATE: 16 BRPM | OXYGEN SATURATION: 97 % | SYSTOLIC BLOOD PRESSURE: 121 MMHG | BODY MASS INDEX: 41.11 KG/M2 | DIASTOLIC BLOOD PRESSURE: 80 MMHG | TEMPERATURE: 96.7 F

## 2025-06-09 DIAGNOSIS — G89.29 CHRONIC BILATERAL LOW BACK PAIN WITHOUT SCIATICA: Primary | ICD-10-CM

## 2025-06-09 DIAGNOSIS — G89.4 CHRONIC PAIN SYNDROME: ICD-10-CM

## 2025-06-09 DIAGNOSIS — M54.50 CHRONIC BILATERAL LOW BACK PAIN WITHOUT SCIATICA: Primary | ICD-10-CM

## 2025-06-09 DIAGNOSIS — M79.18 MYOFASCIAL PAIN SYNDROME: ICD-10-CM

## 2025-06-09 DIAGNOSIS — M53.3 SACROILIAC JOINT DYSFUNCTION OF LEFT SIDE: ICD-10-CM

## 2025-06-09 PROCEDURE — 99214 OFFICE O/P EST MOD 30 MIN: CPT | Performed by: STUDENT IN AN ORGANIZED HEALTH CARE EDUCATION/TRAINING PROGRAM

## 2025-06-09 RX ORDER — MAGNESIUM OXIDE 400 MG/1
400 TABLET ORAL DAILY
Qty: 30 TABLET | Refills: 1 | Status: SHIPPED | OUTPATIENT
Start: 2025-06-09

## 2025-06-09 RX ORDER — CYCLOBENZAPRINE HCL 10 MG
10 TABLET ORAL 3 TIMES DAILY PRN
Qty: 21 TABLET | Refills: 0 | Status: SHIPPED | OUTPATIENT
Start: 2025-06-09 | End: 2025-06-19

## 2025-06-09 NOTE — PROGRESS NOTES
use should be prescribed naloxone. We instructed the patient to keep one nasal spray on his/her person, and instruct his/her family and friends to use 2 sprays under their nose in the event of accidental overdose, then to call 911.    Potential opioid side effects were discussed in detail including constipation, urinary retention, pruritus, respiratory depression, sedation, dependence, addiction, tolerance, opioid induced hyperalgesia, osteopenia, hypogonadism and immune suppression.  Advised to take the opioid medications as prescribed.    2. Physical Therapy: Provided exercises for the low back with instructions to do this at least 3x/week or as tolerated. Patient has tried formal PT in the past without relief of her pain. (Completed this 3 years ago)  3. Psychological: Cognitive behavioral therapy discussed.   4. Complementary and alternative (CAM) Therapies: Mindfulness recommended. Acupuncture to be considered    5. Labs: Reviewed from 1/24/25. Creatinine.6  Hgb A1C 2/28/25 5.3  6. Imaging: Reviewed. Ordered lumbar MRI without contrast to assess for chronic low back pain not improved with conservative treatment.  7. Interventions: left sacroiliac joint injection with fluoroscopic guidance plus left gluteal TPI  8. Referrals: None indicated. Recommend follow up with orthopedics for her right knee effusion and pain.  9. Records requested: None indicated  10. Lifestyle goal (weight, smoking, etc): Improved functioning, pain reduction, minimize medications    Follow up - 6-8 weeks after procedure.    I spent 30 minutes interviewing the patient, performing a physical exam and discussing treatment options with the patient including injections.    Electronically signed by Nohemy Acevedo MD on 6/9/2025 at 2:51 PM

## 2025-06-13 DIAGNOSIS — E66.813 CLASS 3 SEVERE OBESITY DUE TO EXCESS CALORIES WITH SERIOUS COMORBIDITY AND BODY MASS INDEX (BMI) OF 40.0 TO 44.9 IN ADULT (HCC): ICD-10-CM

## 2025-06-13 RX ORDER — PHENTERMINE HYDROCHLORIDE 37.5 MG/1
37.5 TABLET ORAL
Qty: 30 TABLET | Refills: 2 | Status: SHIPPED | OUTPATIENT
Start: 2025-06-13 | End: 2025-09-11

## 2025-06-13 NOTE — TELEPHONE ENCOUNTER
Avelina Torres called to request a refill on her medication.      Last office visit : 5/12/2025   Next office visit : 8/19/2025     Last UDS:   Benzodiazepine Screen, Urine   Date Value Ref Range Status   05/12/2025 neg  Final     Buprenorphine Urine   Date Value Ref Range Status   05/12/2025 neg  Final     Cocaine Metabolite Screen, Urine   Date Value Ref Range Status   05/12/2025 neg  Final     Gabapentin Screen, Urine   Date Value Ref Range Status   05/12/2025 neg  Final     Oxycodone Screen, Ur   Date Value Ref Range Status   05/12/2025 neg  Final     Propoxyphene Screen, Urine   Date Value Ref Range Status   05/12/2025 neg  Final     THC Screen, Urine   Date Value Ref Range Status   05/12/2025 neg  Final     Tricyclic Antidepressants, Urine   Date Value Ref Range Status   05/12/2025 neg  Final       Last Angel: 6/13/25  Medication Contract: 2/28/25   Last Fill: 5/12/25    Requested Prescriptions     Pending Prescriptions Disp Refills    phentermine (ADIPEX-P) 37.5 MG tablet 30 tablet 2     Sig: Take 1 tablet by mouth every morning (before breakfast) for 90 days. Max Daily Amount: 37.5 mg         Please approve or refuse this medication.   Anny Deluca MA

## 2025-06-16 ENCOUNTER — HOSPITAL ENCOUNTER (OUTPATIENT)
Dept: MRI IMAGING | Age: 55
Discharge: HOME | End: 2025-06-16
Attending: STUDENT IN AN ORGANIZED HEALTH CARE EDUCATION/TRAINING PROGRAM
Payer: MEDICARE

## 2025-06-16 DIAGNOSIS — M54.50 CHRONIC BILATERAL LOW BACK PAIN WITHOUT SCIATICA: ICD-10-CM

## 2025-06-16 DIAGNOSIS — G89.29 CHRONIC BILATERAL LOW BACK PAIN WITHOUT SCIATICA: ICD-10-CM

## 2025-06-16 PROCEDURE — 72148 MRI LUMBAR SPINE W/O DYE: CPT

## 2025-07-04 RX ORDER — CYCLOBENZAPRINE HCL 10 MG
10 TABLET ORAL 3 TIMES DAILY PRN
Qty: 90 TABLET | Refills: 1 | Status: SHIPPED | OUTPATIENT
Start: 2025-07-04 | End: 2025-09-02

## 2025-07-10 DIAGNOSIS — K21.9 GASTROESOPHAGEAL REFLUX DISEASE WITHOUT ESOPHAGITIS: ICD-10-CM

## 2025-07-10 RX ORDER — OMEPRAZOLE 40 MG/1
40 CAPSULE, DELAYED RELEASE ORAL
Qty: 90 CAPSULE | Refills: 1 | Status: SHIPPED | OUTPATIENT
Start: 2025-07-10

## 2025-08-06 DIAGNOSIS — E78.49 FAMILIAL HYPERLIPIDEMIA: ICD-10-CM

## 2025-08-06 RX ORDER — ROSUVASTATIN CALCIUM 5 MG/1
5 TABLET, COATED ORAL NIGHTLY
Qty: 90 TABLET | Refills: 0 | Status: SHIPPED | OUTPATIENT
Start: 2025-08-06

## 2025-08-21 ENCOUNTER — OFFICE VISIT (OUTPATIENT)
Dept: OBGYN CLINIC | Age: 55
End: 2025-08-21
Payer: MEDICARE

## 2025-08-21 VITALS
SYSTOLIC BLOOD PRESSURE: 131 MMHG | DIASTOLIC BLOOD PRESSURE: 90 MMHG | HEART RATE: 109 BPM | WEIGHT: 231.6 LBS | BODY MASS INDEX: 39.75 KG/M2

## 2025-08-21 DIAGNOSIS — Z72.820 POOR SLEEP: ICD-10-CM

## 2025-08-21 DIAGNOSIS — M25.50 ARTHRALGIA, UNSPECIFIED JOINT: ICD-10-CM

## 2025-08-21 DIAGNOSIS — Z76.89 ENCOUNTER TO ESTABLISH CARE: Primary | ICD-10-CM

## 2025-08-21 DIAGNOSIS — F41.9 ANXIETY: ICD-10-CM

## 2025-08-21 DIAGNOSIS — R45.89 MOODY: ICD-10-CM

## 2025-08-21 DIAGNOSIS — Z90.710 HISTORY OF HYSTERECTOMY: ICD-10-CM

## 2025-08-21 DIAGNOSIS — R68.82 DECREASED LIBIDO: ICD-10-CM

## 2025-08-21 DIAGNOSIS — F52.0 HYPOACTIVE SEXUAL DESIRE DISORDER: ICD-10-CM

## 2025-08-21 PROCEDURE — 99204 OFFICE O/P NEW MOD 45 MIN: CPT

## 2025-08-21 RX ORDER — PROGESTERONE 100 MG/1
100 CAPSULE ORAL NIGHTLY
Qty: 30 CAPSULE | Refills: 1 | Status: SHIPPED | OUTPATIENT
Start: 2025-08-21

## 2025-08-21 SDOH — ECONOMIC STABILITY: FOOD INSECURITY: WITHIN THE PAST 12 MONTHS, YOU WORRIED THAT YOUR FOOD WOULD RUN OUT BEFORE YOU GOT MONEY TO BUY MORE.: NEVER TRUE

## 2025-08-21 SDOH — ECONOMIC STABILITY: FOOD INSECURITY: WITHIN THE PAST 12 MONTHS, THE FOOD YOU BOUGHT JUST DIDN'T LAST AND YOU DIDN'T HAVE MONEY TO GET MORE.: NEVER TRUE

## 2025-08-21 ASSESSMENT — ENCOUNTER SYMPTOMS
CHEST TIGHTNESS: 0
RESPIRATORY NEGATIVE: 1
BACK PAIN: 0
RECTAL PAIN: 0
GASTROINTESTINAL NEGATIVE: 1
CONSTIPATION: 0
SHORTNESS OF BREATH: 0
DIARRHEA: 0
ABDOMINAL PAIN: 0
NAUSEA: 0

## 2025-08-21 ASSESSMENT — PATIENT HEALTH QUESTIONNAIRE - PHQ9
SUM OF ALL RESPONSES TO PHQ QUESTIONS 1-9: 0
SUM OF ALL RESPONSES TO PHQ QUESTIONS 1-9: 0
1. LITTLE INTEREST OR PLEASURE IN DOING THINGS: NOT AT ALL
2. FEELING DOWN, DEPRESSED OR HOPELESS: NOT AT ALL
SUM OF ALL RESPONSES TO PHQ QUESTIONS 1-9: 0
SUM OF ALL RESPONSES TO PHQ QUESTIONS 1-9: 0

## 2025-08-27 ENCOUNTER — HOSPITAL ENCOUNTER (OUTPATIENT)
Dept: PAIN MANAGEMENT | Age: 55
Discharge: HOME | End: 2025-08-27
Payer: MEDICARE

## 2025-08-27 VITALS
RESPIRATION RATE: 16 BRPM | SYSTOLIC BLOOD PRESSURE: 129 MMHG | OXYGEN SATURATION: 97 % | DIASTOLIC BLOOD PRESSURE: 87 MMHG | HEART RATE: 80 BPM | TEMPERATURE: 97.5 F

## 2025-08-27 DIAGNOSIS — R52 PAIN MANAGEMENT: ICD-10-CM

## 2025-08-27 PROCEDURE — 6360000002 HC RX W HCPCS

## 2025-08-27 PROCEDURE — G0260 INJ FOR SACROILIAC JT ANESTH: HCPCS

## 2025-08-27 PROCEDURE — 20553 NJX 1/MLT TRIGGER POINTS 3/>: CPT

## 2025-08-27 RX ORDER — DEXAMETHASONE SODIUM PHOSPHATE 10 MG/ML
10 INJECTION, SOLUTION INTRAMUSCULAR; INTRAVENOUS ONCE
Status: DISCONTINUED | OUTPATIENT
Start: 2025-08-27 | End: 2025-08-29 | Stop reason: HOSPADM

## 2025-08-27 RX ORDER — LIDOCAINE HYDROCHLORIDE 10 MG/ML
10 INJECTION, SOLUTION EPIDURAL; INFILTRATION; INTRACAUDAL; PERINEURAL ONCE
Status: DISCONTINUED | OUTPATIENT
Start: 2025-08-27 | End: 2025-08-29 | Stop reason: HOSPADM

## 2025-08-27 RX ORDER — BUPIVACAINE HYDROCHLORIDE 5 MG/ML
20 INJECTION, SOLUTION EPIDURAL; INTRACAUDAL; PERINEURAL ONCE
Status: DISCONTINUED | OUTPATIENT
Start: 2025-08-27 | End: 2025-08-29 | Stop reason: HOSPADM

## 2025-08-27 RX ORDER — BUPIVACAINE HYDROCHLORIDE 5 MG/ML
2 INJECTION, SOLUTION EPIDURAL; INTRACAUDAL; PERINEURAL ONCE
Status: DISCONTINUED | OUTPATIENT
Start: 2025-08-27 | End: 2025-08-29 | Stop reason: HOSPADM

## 2025-08-27 ASSESSMENT — PAIN SCALES - GENERAL: PAINLEVEL_OUTOF10: 7

## 2025-09-02 RX ORDER — MAGNESIUM OXIDE 400 MG/1
400 TABLET ORAL DAILY
Qty: 30 TABLET | Refills: 5 | Status: SHIPPED | OUTPATIENT
Start: 2025-09-02

## 2025-09-03 ENCOUNTER — TELEPHONE (OUTPATIENT)
Dept: PAIN MANAGEMENT | Age: 55
End: 2025-09-03

## (undated) DEVICE — GLOVE SURG SZ 85 CRM LTX FREE POLYISOPRENE POLYMER BEAD ANTI

## (undated) DEVICE — DAVINCI: Brand: MEDLINE INDUSTRIES, INC.

## (undated) DEVICE — JACKSON-PRATT 100CC BULB RESERVOIR: Brand: CARDINAL HEALTH

## (undated) DEVICE — ADHESIVE SKIN CLSR 0.7ML TOP DERMBND ADV

## (undated) DEVICE — SUTURE VCRL SZ 0 L27IN ABSRB VLT L36MM UR-5 5/8 CIR J376H

## (undated) DEVICE — DRAPE,ISOLATION,INCISE,INVISISHIELD: Brand: MEDLINE

## (undated) DEVICE — DUAL CUT SAGITTAL BLADE

## (undated) DEVICE — SOLUTION ANTIFOG VIS SYS CLEARIFY LAPSCP

## (undated) DEVICE — GLOVE SURG SZ 7 CRM LTX FREE POLYISOPRENE POLYMER BEAD ANTI

## (undated) DEVICE — SUREFORM 45 RELOAD BLUE: Brand: SUREFORM

## (undated) DEVICE — SUTURE VCRL SZ 0 L27IN ABSRB UD L36MM CT-1 1/2 CIR J260H

## (undated) DEVICE — TISSUE RETRIEVAL SYSTEM: Brand: INZII RETRIEVAL SYSTEM

## (undated) DEVICE — VESSEL SEALER EXTEND: Brand: ENDOWRIST

## (undated) DEVICE — SUTURE ETHLN SZ 2-0 L30IN NONABSORBABLE BLK L36MM FSLX 3/8 1674H

## (undated) DEVICE — SUTURE PERMAHAND SZ 2-0 L18IN NONABSORBABLE BLK L26MM FS 685G

## (undated) DEVICE — TIP COVER ACCESSORY

## (undated) DEVICE — GLOVE SURG SZ 85 L12IN FNGR THK79MIL GRN LTX FREE

## (undated) DEVICE — SUTURE VCRL SZ 3-0 L27IN ABSRB UD L19MM PS-2 3/8 CIR PRIM J427H

## (undated) DEVICE — SOLUTION IV IRRIG POUR BRL 0.9% SODIUM CHL 2F7124

## (undated) DEVICE — CHLORAPREP 26ML ORANGE

## (undated) DEVICE — MAJOR BSIN SETUP PK

## (undated) DEVICE — SUTURE PERMAHAND SZ 0 L30IN NONABSORBABLE BLK SILK BRAID A306H

## (undated) DEVICE — PACK ANT HIP CDS

## (undated) DEVICE — DRESSING FOAM SELF ADH 20X10 CM ABSORBENT MEPILEX BORDER

## (undated) DEVICE — CANNULA SEAL

## (undated) DEVICE — SUTURE VCRL SZ 2-0 L36IN ABSRB UD L36MM CT-1 1/2 CIR J945H

## (undated) DEVICE — STERILE SLEEVE: Brand: CONVERTORS

## (undated) DEVICE — SOLUTION IV IRRIG WATER 1000ML POUR BRL 2F7114

## (undated) DEVICE — SEAL

## (undated) DEVICE — SOLUTION IRRIG 3000ML 0.9% SOD CHL USP UROMATIC PLAS CONT

## (undated) DEVICE — SUCTION IRRIGATOR: Brand: ENDOWRIST

## (undated) DEVICE — COVER LT HNDL BLU PLAS

## (undated) DEVICE — GLOVE SURG SZ 85 L12IN FNGR THK13MIL BRN LTX SYN POLYMER W

## (undated) DEVICE — BAG BND W36XL36IN TRNSPAR POLY GEN PURP W E BND CLSR TIDI

## (undated) DEVICE — GLOVE SURG SZ 85 L12IN FNGR THK94MIL TRNSLUC YEL LTX

## (undated) DEVICE — ACCESS PLATFORM FOR MINIMALLY INVASIVE SURGERY: Brand: GELPOINT®  MINI ADVANCED ACCESS PLATFORM

## (undated) DEVICE — ARM DRAPE

## (undated) DEVICE — DRAIN SURG 15FR 100% SIL RND END PERF

## (undated) DEVICE — LIGHT SUCT UNTETHERED SCINTILLANT

## (undated) DEVICE — 3M™ IOBAN™ 2 ANTIMICROBIAL INCISE DRAPE 6650EZ: Brand: IOBAN™ 2

## (undated) DEVICE — SOLUTION IV 1000ML 0.9% SOD CHL PH 5 INJ USP VIAFLX PLAS

## (undated) DEVICE — Z INACTIVE USE 2660664 SOLUTION IRRIG 3000ML 0.9% SOD CHL USP UROMATIC PLAS CONT

## (undated) DEVICE — SUTURE VCRL SZ 3-0 L27IN ABSRB UD L26MM SH 1/2 CIR J416H

## (undated) DEVICE — DOUBLE BASIN PLUS 2-LF: Brand: MEDLINE INDUSTRIES, INC.

## (undated) DEVICE — BLADELESS OBTURATOR: Brand: WECK VISTA

## (undated) DEVICE — SUREFORM 45: Brand: SUREFORM

## (undated) DEVICE — REDUCER: Brand: ENDOWRIST

## (undated) DEVICE — SUTURE PROL SZ 0 L30IN NONABSORBABLE BLU L26MM CT-2 1/2 CIR 8412H

## (undated) DEVICE — SUTURE V-LOC 180 SZ 3-0 L6IN ABSRB GRN V-20 L26MM 1/2 CIR VLOCL0604

## (undated) DEVICE — COVER POS PERINL POST NS 082501

## (undated) DEVICE — PACK,UNIVERSAL,NO GOWNS: Brand: MEDLINE

## (undated) DEVICE — SUTURE PDS II SZ 1 L27IN ABSRB VLT CT L40MM 1/2 CIR TAPR Z353H

## (undated) DEVICE — SYSTEM SKIN CLSR 22CM DERMBND PRINEO

## (undated) DEVICE — TRI-LUMEN FILTERED TUBE SET WITH ACTIVATED CHARCOAL FILTER: Brand: AIRSEAL

## (undated) DEVICE — GLOVE SURG SZ 85 L12IN FNGR ORTHO 126MIL CRM LTX FREE

## (undated) DEVICE — DRESSING TRNSPAR W5XL4.5IN FLM SHT SEMIPERMEABLE WIND

## (undated) DEVICE — HYPODERMIC SAFETY NEEDLE: Brand: MAGELLAN

## (undated) DEVICE — GOWN,PREVENTION PLUS,XL,ST,24/CS: Brand: MEDLINE

## (undated) DEVICE — JP PERF DRN SIL FLT 7MM FULL: Brand: CARDINAL HEALTH

## (undated) DEVICE — AIRSEAL 5 MM ACCESS PORT AND LOW PROFILE OBTURATOR WITH BLADELESS OPTICAL TIP, 120 MM LENGTH: Brand: AIRSEAL